# Patient Record
Sex: FEMALE | Race: WHITE | Employment: FULL TIME | URBAN - METROPOLITAN AREA
[De-identification: names, ages, dates, MRNs, and addresses within clinical notes are randomized per-mention and may not be internally consistent; named-entity substitution may affect disease eponyms.]

---

## 2017-01-18 ENCOUNTER — TELEPHONE (OUTPATIENT)
Dept: INTERNAL MEDICINE | Age: 57
End: 2017-01-18

## 2017-02-14 RX ORDER — LORAZEPAM 1 MG/1
TABLET ORAL
Qty: 30 TABLET | Refills: 5 | Status: SHIPPED | OUTPATIENT
Start: 2017-02-14 | End: 2017-06-16 | Stop reason: SDUPTHER

## 2017-06-16 ENCOUNTER — OFFICE VISIT (OUTPATIENT)
Dept: INTERNAL MEDICINE | Age: 57
End: 2017-06-16

## 2017-06-16 VITALS
DIASTOLIC BLOOD PRESSURE: 80 MMHG | SYSTOLIC BLOOD PRESSURE: 122 MMHG | WEIGHT: 210 LBS | BODY MASS INDEX: 35.85 KG/M2 | HEIGHT: 64 IN

## 2017-06-16 DIAGNOSIS — E66.09 NON MORBID OBESITY DUE TO EXCESS CALORIES: ICD-10-CM

## 2017-06-16 DIAGNOSIS — F41.9 ANXIETY: ICD-10-CM

## 2017-06-16 DIAGNOSIS — R73.9 HYPERGLYCEMIA: ICD-10-CM

## 2017-06-16 DIAGNOSIS — E78.00 PURE HYPERCHOLESTEROLEMIA: ICD-10-CM

## 2017-06-16 DIAGNOSIS — G43.009 MIGRAINE WITHOUT AURA AND WITHOUT STATUS MIGRAINOSUS, NOT INTRACTABLE: ICD-10-CM

## 2017-06-16 PROCEDURE — 99214 OFFICE O/P EST MOD 30 MIN: CPT | Performed by: INTERNAL MEDICINE

## 2017-06-16 RX ORDER — METFORMIN HYDROCHLORIDE 500 MG/1
1000 TABLET, EXTENDED RELEASE ORAL
Qty: 60 TABLET | Refills: 5 | Status: SHIPPED | OUTPATIENT
Start: 2017-06-16 | End: 2017-08-23 | Stop reason: SDUPTHER

## 2017-06-16 RX ORDER — LORAZEPAM 1 MG/1
TABLET ORAL
Qty: 30 TABLET | Refills: 0 | Status: SHIPPED | OUTPATIENT
Start: 2017-06-16 | End: 2017-06-16 | Stop reason: SDUPTHER

## 2017-06-18 RX ORDER — LORAZEPAM 1 MG/1
TABLET ORAL
Qty: 30 TABLET | Refills: 0 | OUTPATIENT
Start: 2017-06-18 | End: 2017-08-23 | Stop reason: SDUPTHER

## 2017-06-18 ASSESSMENT — ENCOUNTER SYMPTOMS
WHEEZING: 0
COLOR CHANGE: 0
BACK PAIN: 0
ABDOMINAL PAIN: 0
CHEST TIGHTNESS: 0
SHORTNESS OF BREATH: 0

## 2017-08-23 ENCOUNTER — TELEPHONE (OUTPATIENT)
Dept: INTERNAL MEDICINE | Age: 57
End: 2017-08-23

## 2017-08-23 RX ORDER — METFORMIN HYDROCHLORIDE 500 MG/1
1000 TABLET, EXTENDED RELEASE ORAL
Qty: 120 TABLET | Refills: 5 | Status: SHIPPED | OUTPATIENT
Start: 2017-08-23 | End: 2019-04-08

## 2017-08-23 RX ORDER — LORAZEPAM 1 MG/1
TABLET ORAL
Qty: 30 TABLET | Refills: 3 | Status: SHIPPED | OUTPATIENT
Start: 2017-08-23 | End: 2018-04-24 | Stop reason: SDUPTHER

## 2017-08-30 ENCOUNTER — TELEPHONE (OUTPATIENT)
Dept: INTERNAL MEDICINE | Age: 57
End: 2017-08-30

## 2017-09-27 ENCOUNTER — OFFICE VISIT (OUTPATIENT)
Dept: INTERNAL MEDICINE | Age: 57
End: 2017-09-27

## 2017-09-27 VITALS
HEIGHT: 64 IN | SYSTOLIC BLOOD PRESSURE: 118 MMHG | BODY MASS INDEX: 35.85 KG/M2 | DIASTOLIC BLOOD PRESSURE: 80 MMHG | WEIGHT: 210 LBS

## 2017-09-27 DIAGNOSIS — E66.09 NON MORBID OBESITY DUE TO EXCESS CALORIES: ICD-10-CM

## 2017-09-27 PROCEDURE — 99213 OFFICE O/P EST LOW 20 MIN: CPT | Performed by: INTERNAL MEDICINE

## 2017-09-27 RX ORDER — AMOXICILLIN AND CLAVULANATE POTASSIUM 875; 125 MG/1; MG/1
1 TABLET, FILM COATED ORAL 2 TIMES DAILY
Qty: 14 TABLET | Refills: 0 | Status: SHIPPED | OUTPATIENT
Start: 2017-09-27 | End: 2017-10-04

## 2017-09-30 ASSESSMENT — ENCOUNTER SYMPTOMS
WHEEZING: 0
COUGH: 0
COLOR CHANGE: 1
ABDOMINAL PAIN: 0

## 2017-10-10 ENCOUNTER — TELEPHONE (OUTPATIENT)
Dept: INTERNAL MEDICINE | Age: 57
End: 2017-10-10

## 2017-10-10 RX ORDER — PROMETHAZINE HYDROCHLORIDE AND CODEINE PHOSPHATE 6.25; 1 MG/5ML; MG/5ML
5 SYRUP ORAL EVERY 4 HOURS PRN
Qty: 180 ML | Refills: 1 | OUTPATIENT
Start: 2017-10-10 | End: 2021-02-25

## 2017-10-10 RX ORDER — AZITHROMYCIN 250 MG/1
TABLET, FILM COATED ORAL
Qty: 1 PACKET | Refills: 1 | Status: SHIPPED | OUTPATIENT
Start: 2017-10-10 | End: 2017-10-20

## 2017-10-10 NOTE — TELEPHONE ENCOUNTER
Can you call in phen w codeine cough syrup to her [facility]?  I sent in a [facility] for her

## 2018-04-24 RX ORDER — LORAZEPAM 1 MG/1
TABLET ORAL
Qty: 30 TABLET | Refills: 0 | OUTPATIENT
Start: 2018-04-24 | End: 2018-08-30 | Stop reason: SDUPTHER

## 2018-08-30 ENCOUNTER — TELEPHONE (OUTPATIENT)
Dept: INTERNAL MEDICINE | Age: 58
End: 2018-08-30

## 2018-08-30 DIAGNOSIS — R73.9 HYPERGLYCEMIA: ICD-10-CM

## 2018-08-30 DIAGNOSIS — E55.9 VITAMIN D DEFICIENCY: ICD-10-CM

## 2018-08-30 DIAGNOSIS — F41.9 ANXIETY: Primary | ICD-10-CM

## 2018-08-30 DIAGNOSIS — Z00.00 WELL ADULT EXAM: Primary | ICD-10-CM

## 2018-08-30 DIAGNOSIS — G43.009 MIGRAINE WITHOUT AURA AND WITHOUT STATUS MIGRAINOSUS, NOT INTRACTABLE: ICD-10-CM

## 2018-08-30 DIAGNOSIS — E78.00 PURE HYPERCHOLESTEROLEMIA: ICD-10-CM

## 2018-08-30 DIAGNOSIS — F41.9 ANXIETY: ICD-10-CM

## 2018-08-30 RX ORDER — LORAZEPAM 1 MG/1
TABLET ORAL
Qty: 30 TABLET | Refills: 0 | Status: SHIPPED | OUTPATIENT
Start: 2018-08-30 | End: 2018-08-30 | Stop reason: SDUPTHER

## 2018-08-30 RX ORDER — LORAZEPAM 1 MG/1
TABLET ORAL
Qty: 30 TABLET | Refills: 0 | Status: SHIPPED | OUTPATIENT
Start: 2018-08-30 | End: 2018-09-28 | Stop reason: SDUPTHER

## 2018-09-28 ENCOUNTER — OFFICE VISIT (OUTPATIENT)
Dept: INTERNAL MEDICINE CLINIC | Age: 58
End: 2018-09-28
Payer: COMMERCIAL

## 2018-09-28 VITALS
WEIGHT: 212 LBS | HEIGHT: 64 IN | BODY MASS INDEX: 36.19 KG/M2 | DIASTOLIC BLOOD PRESSURE: 80 MMHG | SYSTOLIC BLOOD PRESSURE: 140 MMHG

## 2018-09-28 DIAGNOSIS — F41.9 ANXIETY: ICD-10-CM

## 2018-09-28 DIAGNOSIS — R73.9 HYPERGLYCEMIA: ICD-10-CM

## 2018-09-28 DIAGNOSIS — E78.00 PURE HYPERCHOLESTEROLEMIA: ICD-10-CM

## 2018-09-28 DIAGNOSIS — L65.9 HAIR THINNING: ICD-10-CM

## 2018-09-28 DIAGNOSIS — Z23 NEED FOR INFLUENZA VACCINATION: ICD-10-CM

## 2018-09-28 DIAGNOSIS — K64.0 GRADE I HEMORRHOIDS: ICD-10-CM

## 2018-09-28 DIAGNOSIS — Z00.00 WELL ADULT EXAM: Primary | ICD-10-CM

## 2018-09-28 DIAGNOSIS — R80.0 ISOLATED PROTEINURIA WITHOUT SPECIFIC MORPHOLOGIC LESION: ICD-10-CM

## 2018-09-28 DIAGNOSIS — E66.09 CLASS 2 OBESITY DUE TO EXCESS CALORIES WITHOUT SERIOUS COMORBIDITY WITH BODY MASS INDEX (BMI) OF 36.0 TO 36.9 IN ADULT: ICD-10-CM

## 2018-09-28 PROCEDURE — 99396 PREV VISIT EST AGE 40-64: CPT | Performed by: INTERNAL MEDICINE

## 2018-09-28 PROCEDURE — 90471 IMMUNIZATION ADMIN: CPT | Performed by: INTERNAL MEDICINE

## 2018-09-28 PROCEDURE — 90662 IIV NO PRSV INCREASED AG IM: CPT | Performed by: INTERNAL MEDICINE

## 2018-09-28 RX ORDER — LORAZEPAM 1 MG/1
TABLET ORAL
Qty: 30 TABLET | Refills: 0 | Status: SHIPPED | OUTPATIENT
Start: 2018-09-28 | End: 2018-12-27 | Stop reason: SDUPTHER

## 2018-09-28 ASSESSMENT — ENCOUNTER SYMPTOMS
WHEEZING: 0
CHEST TIGHTNESS: 0
COLOR CHANGE: 0
ABDOMINAL PAIN: 0
BACK PAIN: 0

## 2018-09-28 ASSESSMENT — PATIENT HEALTH QUESTIONNAIRE - PHQ9
SUM OF ALL RESPONSES TO PHQ QUESTIONS 1-9: 1
1. LITTLE INTEREST OR PLEASURE IN DOING THINGS: 1
SUM OF ALL RESPONSES TO PHQ9 QUESTIONS 1 & 2: 1
2. FEELING DOWN, DEPRESSED OR HOPELESS: 0
SUM OF ALL RESPONSES TO PHQ QUESTIONS 1-9: 1

## 2018-09-28 NOTE — PROGRESS NOTES
140/80   09/27/17 118/80   06/16/17 122/80         Immunization History   Administered Date(s) Administered    DTaP 05/07/2003    Influenza Virus Vaccine 10/29/2010    Influenza, Intradermal, Preservative free 11/30/2012, 11/11/2013, 12/15/2015    Influenza, Intradermal, Quadrivalent, Preservative Free 09/12/2016    Tdap (Boostrix, Adacel) 11/11/2013       Past Medical History:   Diagnosis Date    Allergic rhinitis, cause unspecified     chronic    Anxiety state, unspecified     Blepharophimosis     Contact dermatitis and other eczema, due to unspecified cause     Depressive disorder, not elsewhere classified     Headache     Insomnia, unspecified     Obesity, unspecified     Other and unspecified hyperlipidemia     Other specified congenital anomaly of skin     Pain in both feet 12/17/2015    S/p fracture 2015     Screening colonoscopy 6/2014    normal-repeat 10 yrs     Unspecified sinusitis (chronic)      Past Surgical History:   Procedure Laterality Date    COLONOSCOPY  7/21/2014    repeat 10 yrs.  HYSTERECTOMY  2000    for prolapse partial    LIPOMA RESECTION  2007    left thigh     Family History   Problem Relation Age of Onset    Lung Cancer Father      Social History     Social History    Marital status:      Spouse name: N/A    Number of children: N/A    Years of education: N/A     Occupational History    Not on file. Social History Main Topics    Smoking status: Never Smoker    Smokeless tobacco: Never Used    Alcohol use 0.0 oz/week      Comment: occasionally    Drug use: No    Sexual activity: Not Currently     Other Topics Concern    Not on file     Social History Narrative    No narrative on file             Review of Systems   Constitutional: Negative for activity change, appetite change and fatigue. HENT: Negative for congestion. Eyes: Negative for visual disturbance. Respiratory: Negative for chest tightness and wheezing.     Cardiovascular: affect. Her speech is normal and behavior is normal. Judgment and thought content normal. Cognition and memory are normal.         Assessment and Plan:      Grade I hemorrhoids  Referred to rectal surgery     Hyperglycemia  Prefers to avoid metformin     Anxiety  Work on meditative boo    Hyperlipemia  ?? Is this reading correct-recheck 6 mo    Class 2 obesity due to excess calories without serious comorbidity with body mass index (BMI) of 36.0 to 36.9 in adult  Discussed with patient at length health risks of obesity and need for diet and exercise           Complete physical completed. Patient now up to date with all routine screening including Pap and colonoscopy if needed, yearly eye and dental exams,labs, dexa if indicated. Counseled re: nutrition/calcium and vit d supplementation,seat belt use, no cell phone use with driving.

## 2018-12-27 ENCOUNTER — OFFICE VISIT (OUTPATIENT)
Dept: INTERNAL MEDICINE CLINIC | Age: 58
End: 2018-12-27
Payer: COMMERCIAL

## 2018-12-27 VITALS
BODY MASS INDEX: 36.19 KG/M2 | HEIGHT: 64 IN | WEIGHT: 212 LBS | SYSTOLIC BLOOD PRESSURE: 138 MMHG | DIASTOLIC BLOOD PRESSURE: 80 MMHG

## 2018-12-27 DIAGNOSIS — E66.09 CLASS 2 OBESITY DUE TO EXCESS CALORIES WITHOUT SERIOUS COMORBIDITY WITH BODY MASS INDEX (BMI) OF 36.0 TO 36.9 IN ADULT: ICD-10-CM

## 2018-12-27 DIAGNOSIS — K58.0 IRRITABLE BOWEL SYNDROME WITH DIARRHEA: ICD-10-CM

## 2018-12-27 DIAGNOSIS — R73.9 HYPERGLYCEMIA: ICD-10-CM

## 2018-12-27 DIAGNOSIS — F41.9 ANXIETY: ICD-10-CM

## 2018-12-27 PROBLEM — K64.0 GRADE I HEMORRHOIDS: Status: RESOLVED | Noted: 2018-09-28 | Resolved: 2018-12-27

## 2018-12-27 PROCEDURE — 3017F COLORECTAL CA SCREEN DOC REV: CPT | Performed by: INTERNAL MEDICINE

## 2018-12-27 PROCEDURE — 1036F TOBACCO NON-USER: CPT | Performed by: INTERNAL MEDICINE

## 2018-12-27 PROCEDURE — G8482 FLU IMMUNIZE ORDER/ADMIN: HCPCS | Performed by: INTERNAL MEDICINE

## 2018-12-27 PROCEDURE — G8427 DOCREV CUR MEDS BY ELIG CLIN: HCPCS | Performed by: INTERNAL MEDICINE

## 2018-12-27 PROCEDURE — 99214 OFFICE O/P EST MOD 30 MIN: CPT | Performed by: INTERNAL MEDICINE

## 2018-12-27 PROCEDURE — G8417 CALC BMI ABV UP PARAM F/U: HCPCS | Performed by: INTERNAL MEDICINE

## 2018-12-27 RX ORDER — LORAZEPAM 1 MG/1
TABLET ORAL
Qty: 30 TABLET | Refills: 0 | Status: SHIPPED | OUTPATIENT
Start: 2018-12-27 | End: 2019-04-09 | Stop reason: SDUPTHER

## 2018-12-27 ASSESSMENT — ENCOUNTER SYMPTOMS
BACK PAIN: 0
WHEEZING: 0
DIARRHEA: 1
ABDOMINAL PAIN: 0
CHEST TIGHTNESS: 0
ABDOMINAL DISTENTION: 1
COLOR CHANGE: 0

## 2019-03-25 RX ORDER — PROMETHAZINE HYDROCHLORIDE 25 MG/1
25 SUPPOSITORY RECTAL EVERY 6 HOURS PRN
Qty: 10 SUPPOSITORY | Refills: 0 | Status: SHIPPED | OUTPATIENT
Start: 2019-03-25 | End: 2019-04-01

## 2019-04-03 ENCOUNTER — OFFICE VISIT (OUTPATIENT)
Dept: INTERNAL MEDICINE CLINIC | Age: 59
End: 2019-04-03
Payer: COMMERCIAL

## 2019-04-03 ENCOUNTER — TELEPHONE (OUTPATIENT)
Dept: INTERNAL MEDICINE CLINIC | Age: 59
End: 2019-04-03

## 2019-04-03 VITALS
SYSTOLIC BLOOD PRESSURE: 120 MMHG | OXYGEN SATURATION: 93 % | HEART RATE: 116 BPM | DIASTOLIC BLOOD PRESSURE: 82 MMHG | TEMPERATURE: 98.7 F

## 2019-04-03 DIAGNOSIS — J06.9 VIRAL UPPER RESPIRATORY TRACT INFECTION: ICD-10-CM

## 2019-04-03 DIAGNOSIS — H90.12 CONDUCTIVE HEARING LOSS OF LEFT EAR WITH UNRESTRICTED HEARING OF RIGHT EAR: Primary | ICD-10-CM

## 2019-04-03 PROCEDURE — G8427 DOCREV CUR MEDS BY ELIG CLIN: HCPCS | Performed by: NURSE PRACTITIONER

## 2019-04-03 PROCEDURE — 3017F COLORECTAL CA SCREEN DOC REV: CPT | Performed by: NURSE PRACTITIONER

## 2019-04-03 PROCEDURE — 1036F TOBACCO NON-USER: CPT | Performed by: NURSE PRACTITIONER

## 2019-04-03 PROCEDURE — G8417 CALC BMI ABV UP PARAM F/U: HCPCS | Performed by: NURSE PRACTITIONER

## 2019-04-03 PROCEDURE — 99213 OFFICE O/P EST LOW 20 MIN: CPT | Performed by: NURSE PRACTITIONER

## 2019-04-03 ASSESSMENT — ENCOUNTER SYMPTOMS
SINUS PAIN: 0
NAUSEA: 0
VOMITING: 0
SORE THROAT: 0
SHORTNESS OF BREATH: 0
RHINORRHEA: 1
COUGH: 0
SINUS PRESSURE: 1
WHEEZING: 0
DIARRHEA: 0
BACK PAIN: 0

## 2019-04-03 NOTE — PROGRESS NOTES
Subjective:      Patient ID: Ondina King is a 64 y.o. female. Chief Complaint   Patient presents with    Generalized Body Aches    Fever    Emesis     She was taking Phenergan which helped     HPI  Rock Belcher is here for low grade fever, body aches, and sinus pressure that began yesterday. Fever was 100.0. Headache. Sudafed helped. Changes pain. No ear pain. Ear fullness on the left but not painful. She also reprots runny nose and sneezing. Denies coughing and sore throat. She reports a stomach virus last week with nausea, vomiting, and diarrhea. She was given phenergan with codeine which helped. Symptoms have resolved. Review of Systems   Constitutional: Positive for fatigue and fever. Negative for chills. HENT: Positive for rhinorrhea and sinus pressure. Negative for congestion, sinus pain and sore throat. Respiratory: Negative for cough, shortness of breath and wheezing. Cardiovascular: Negative for chest pain and palpitations. Gastrointestinal: Negative for diarrhea, nausea and vomiting. Musculoskeletal: Positive for myalgias. Negative for arthralgias, back pain and gait problem. Neurological: Positive for headaches. Negative for dizziness, syncope and weakness. Hematological: Negative for adenopathy. Objective:   Physical Exam   Constitutional: She is oriented to person, place, and time. She appears well-developed and well-nourished. HENT:   Right Ear: Tympanic membrane normal.   Left Ear: Tympanic membrane normal.   Nose: Rhinorrhea present. No mucosal edema. Right sinus exhibits no maxillary sinus tenderness and no frontal sinus tenderness. Left sinus exhibits no maxillary sinus tenderness and no frontal sinus tenderness. Mouth/Throat: Oropharynx is clear and moist. No oropharyngeal exudate. Cardiovascular: Normal rate, regular rhythm and normal heart sounds.    Pulmonary/Chest: Effort normal and breath sounds normal.   Neurological: She is alert and oriented to person, place, and time. Skin: Skin is warm and dry. Assessment:      See Problem List assessment and plan       Plan:       Viral upper respiratory tract infection  Continue symptomatic treatment   Suggested decongestants, antihistamines, saline rinses  Tylenol and ibuprofen for pain and fevers   Call for worsening symptoms or if symptoms do not improve in 4-7 days. Patient engaged in shared decision making. Information given to evaluate options of treatment, understand what is needed and discuss importance of following plan.

## 2019-04-03 NOTE — TELEPHONE ENCOUNTER
Received a summary of hearing loss via fax today from our office  [facility] is not sure why  Is the pt going to be calling to schedule an appointment? Or does [facility] need to call the patient?   Please return call

## 2019-04-03 NOTE — TELEPHONE ENCOUNTER
Lm with [facility]  Patient will call to schedule appointment

## 2019-04-08 ENCOUNTER — OFFICE VISIT (OUTPATIENT)
Dept: INTERNAL MEDICINE CLINIC | Age: 59
End: 2019-04-08
Payer: COMMERCIAL

## 2019-04-08 ENCOUNTER — TELEPHONE (OUTPATIENT)
Dept: INTERNAL MEDICINE CLINIC | Age: 59
End: 2019-04-08

## 2019-04-08 VITALS
WEIGHT: 212 LBS | SYSTOLIC BLOOD PRESSURE: 122 MMHG | HEIGHT: 64 IN | BODY MASS INDEX: 36.19 KG/M2 | DIASTOLIC BLOOD PRESSURE: 82 MMHG

## 2019-04-08 DIAGNOSIS — B34.9 VIRAL SYNDROME: ICD-10-CM

## 2019-04-08 DIAGNOSIS — F41.9 ANXIETY: ICD-10-CM

## 2019-04-08 DIAGNOSIS — M25.542 ARTHRALGIA OF BOTH HANDS: ICD-10-CM

## 2019-04-08 DIAGNOSIS — R73.9 HYPERGLYCEMIA: ICD-10-CM

## 2019-04-08 DIAGNOSIS — M25.541 ARTHRALGIA OF BOTH HANDS: ICD-10-CM

## 2019-04-08 DIAGNOSIS — E66.09 CLASS 2 OBESITY DUE TO EXCESS CALORIES WITHOUT SERIOUS COMORBIDITY WITH BODY MASS INDEX (BMI) OF 36.0 TO 36.9 IN ADULT: ICD-10-CM

## 2019-04-08 PROBLEM — J06.9 VIRAL UPPER RESPIRATORY TRACT INFECTION: Status: RESOLVED | Noted: 2019-04-03 | Resolved: 2019-04-08

## 2019-04-08 LAB
A/G RATIO: 1 (ref 1.1–2.2)
ALBUMIN SERPL-MCNC: 3.6 G/DL (ref 3.4–5)
ALP BLD-CCNC: 93 U/L (ref 40–129)
ALT SERPL-CCNC: 33 U/L (ref 10–40)
ANION GAP SERPL CALCULATED.3IONS-SCNC: 13 MMOL/L (ref 3–16)
AST SERPL-CCNC: 20 U/L (ref 15–37)
BASOPHILS ABSOLUTE: 0 K/UL (ref 0–0.2)
BASOPHILS RELATIVE PERCENT: 0.3 %
BILIRUB SERPL-MCNC: 0.4 MG/DL (ref 0–1)
BUN BLDV-MCNC: 11 MG/DL (ref 7–20)
CALCIUM SERPL-MCNC: 9 MG/DL (ref 8.3–10.6)
CHLORIDE BLD-SCNC: 101 MMOL/L (ref 99–110)
CO2: 26 MMOL/L (ref 21–32)
CREAT SERPL-MCNC: 0.5 MG/DL (ref 0.6–1.1)
CREATININE URINE: 269.6 MG/DL (ref 28–259)
EOSINOPHILS ABSOLUTE: 0.2 K/UL (ref 0–0.6)
EOSINOPHILS RELATIVE PERCENT: 3.1 %
GFR AFRICAN AMERICAN: >60
GFR NON-AFRICAN AMERICAN: >60
GLOBULIN: 3.7 G/DL
GLUCOSE BLD-MCNC: 116 MG/DL (ref 70–99)
HCT VFR BLD CALC: 37 % (ref 36–48)
HEMOGLOBIN: 12.7 G/DL (ref 12–16)
LYMPHOCYTES ABSOLUTE: 1.1 K/UL (ref 1–5.1)
LYMPHOCYTES RELATIVE PERCENT: 15.2 %
MCH RBC QN AUTO: 30.9 PG (ref 26–34)
MCHC RBC AUTO-ENTMCNC: 34.3 G/DL (ref 31–36)
MCV RBC AUTO: 90.2 FL (ref 80–100)
MICROALBUMIN UR-MCNC: <1.2 MG/DL
MICROALBUMIN/CREAT UR-RTO: ABNORMAL MG/G (ref 0–30)
MONOCYTES ABSOLUTE: 0.6 K/UL (ref 0–1.3)
MONOCYTES RELATIVE PERCENT: 8.5 %
NEUTROPHILS ABSOLUTE: 5.4 K/UL (ref 1.7–7.7)
NEUTROPHILS RELATIVE PERCENT: 72.9 %
PDW BLD-RTO: 13.4 % (ref 12.4–15.4)
PLATELET # BLD: 215 K/UL (ref 135–450)
PMV BLD AUTO: 10.5 FL (ref 5–10.5)
POTASSIUM SERPL-SCNC: 4.3 MMOL/L (ref 3.5–5.1)
RBC # BLD: 4.1 M/UL (ref 4–5.2)
RHEUMATOID FACTOR: 26 IU/ML
SODIUM BLD-SCNC: 140 MMOL/L (ref 136–145)
TOTAL PROTEIN: 7.3 G/DL (ref 6.4–8.2)
TSH SERPL DL<=0.05 MIU/L-ACNC: 1.89 UIU/ML (ref 0.27–4.2)
WBC # BLD: 7.4 K/UL (ref 4–11)

## 2019-04-08 PROCEDURE — G8417 CALC BMI ABV UP PARAM F/U: HCPCS | Performed by: INTERNAL MEDICINE

## 2019-04-08 PROCEDURE — G8428 CUR MEDS NOT DOCUMENT: HCPCS | Performed by: INTERNAL MEDICINE

## 2019-04-08 PROCEDURE — 3017F COLORECTAL CA SCREEN DOC REV: CPT | Performed by: INTERNAL MEDICINE

## 2019-04-08 PROCEDURE — 1036F TOBACCO NON-USER: CPT | Performed by: INTERNAL MEDICINE

## 2019-04-08 PROCEDURE — 99214 OFFICE O/P EST MOD 30 MIN: CPT | Performed by: INTERNAL MEDICINE

## 2019-04-08 ASSESSMENT — PATIENT HEALTH QUESTIONNAIRE - PHQ9
SUM OF ALL RESPONSES TO PHQ9 QUESTIONS 1 & 2: 2
SUM OF ALL RESPONSES TO PHQ QUESTIONS 1-9: 2
SUM OF ALL RESPONSES TO PHQ QUESTIONS 1-9: 2
2. FEELING DOWN, DEPRESSED OR HOPELESS: 0
1. LITTLE INTEREST OR PLEASURE IN DOING THINGS: 2

## 2019-04-08 ASSESSMENT — ENCOUNTER SYMPTOMS
CHEST TIGHTNESS: 0
SHORTNESS OF BREATH: 0
SINUS PRESSURE: 0
COLOR CHANGE: 0
WHEEZING: 0
RHINORRHEA: 0
COUGH: 0
ABDOMINAL PAIN: 0
BACK PAIN: 0

## 2019-04-08 NOTE — PROGRESS NOTES
Subjective:      Patient ID: Jeremy Brantley is a 64 y.o. female. Chief Complaint   Patient presents with    Pain     x 2 wks not feeling bad , now hard to move around ,all joints really hurting painful walking,neck pain, using adivl  not much relief      Got sl better after ? Viral infection then recurred w severe joint pain in hands/legs and neck. Tearful as she is so miserable but only took 1 advil. Had low grade fever. No cough. Severe fatigue. conts w some anxiety but not on zoloft any longer and depression has been fairly well controlled. No progress w wt loss. Also off of her metformin. Review of Systems   Constitutional: Positive for fatigue. Negative for activity change and appetite change. HENT: Negative for congestion, rhinorrhea and sinus pressure. Eyes: Negative for visual disturbance. Respiratory: Negative for cough, chest tightness, shortness of breath and wheezing. Cardiovascular: Negative for chest pain and palpitations. Gastrointestinal: Negative for abdominal pain. Musculoskeletal: Positive for arthralgias and myalgias. Negative for back pain. Skin: Negative for color change and rash. Neurological: Negative for weakness, light-headedness and headaches. Psychiatric/Behavioral: The patient is nervous/anxious. Objective:   Physical Exam   Constitutional: She is oriented to person, place, and time. She appears well-developed and well-nourished. HENT:   Head: Normocephalic and atraumatic. Eyes: Pupils are equal, round, and reactive to light. Conjunctivae and EOM are normal.   Neck: Normal range of motion. Neck supple. Cardiovascular: Normal rate, regular rhythm and normal heart sounds. Pulmonary/Chest: Effort normal and breath sounds normal. No respiratory distress. Abdominal: She exhibits no distension. There is no tenderness. Musculoskeletal: She exhibits tenderness. She exhibits no edema.    Tenderness in hands w/out redness or swelling   No other swollen red or hot joints    Lymphadenopathy:     She has no cervical adenopathy. Neurological: She is alert and oriented to person, place, and time. Skin: Skin is warm and dry. Psychiatric: She has a normal mood and affect. Her behavior is normal. Judgment and thought content normal.         Assessment and Plan:      Arthralgia of both hands  Likely viral     Anxiety  Worse w illness- try to avoid overuse of ativan     Hyperglycemia  Recheck     Viral syndrome  Likely etiology but will r/o RA    Class 2 obesity due to excess calories without serious comorbidity with body mass index (BMI) of 36.0 to 36.9 in adult  Discussed with patient at length health risks of obesity and need for diet and exercise         Encounter Diagnoses   Name Primary?     Arthralgia of both hands     Anxiety     Hyperglycemia     Viral syndrome     Class 2 obesity due to excess calories without serious comorbidity with body mass index (BMI) of 36.0 to 36.9 in adult        Orders Placed This Encounter   Procedures    TSH without Reflex     Standing Status:   Future     Number of Occurrences:   1     Standing Expiration Date:   4/7/2020    CBC Auto Differential     Standing Status:   Future     Number of Occurrences:   1     Standing Expiration Date:   4/7/2020    Comprehensive Metabolic Panel     Standing Status:   Future     Number of Occurrences:   1     Standing Expiration Date:   4/7/2020    Sedimentation rate, automated     Standing Status:   Future     Number of Occurrences:   1     Standing Expiration Date:   4/7/2020    Rheumatoid Factor     Standing Status:   Future     Number of Occurrences:   1     Standing Expiration Date:   7/1/3569    Cyclic Citrul Peptide Antibody, IgG     Standing Status:   Future     Number of Occurrences:   1     Standing Expiration Date:   4/8/2020    Hemoglobin A1C     Standing Status:   Future     Number of Occurrences:   1     Standing Expiration Date:   4/7/2020    Microalbumin / Creatinine Urine Ratio     Standing Status:   Future     Number of Occurrences:   1     Standing Expiration Date:   4/7/2020    URINALYSIS WITH MICROSCOPIC     Order Specific Question:   SPECIFY(EX-CATH,MIDSTREAM,CYSTO,ETC)?      Answer:   midstream

## 2019-04-09 ENCOUNTER — TELEPHONE (OUTPATIENT)
Dept: INTERNAL MEDICINE CLINIC | Age: 59
End: 2019-04-09

## 2019-04-09 DIAGNOSIS — M05.79 RHEUMATOID ARTHRITIS INVOLVING MULTIPLE SITES WITH POSITIVE RHEUMATOID FACTOR (HCC): Primary | ICD-10-CM

## 2019-04-09 DIAGNOSIS — M05.79 RHEUMATOID ARTHRITIS INVOLVING MULTIPLE SITES WITH POSITIVE RHEUMATOID FACTOR (HCC): ICD-10-CM

## 2019-04-09 DIAGNOSIS — F41.9 ANXIETY: ICD-10-CM

## 2019-04-09 LAB
ESTIMATED AVERAGE GLUCOSE: 131.2 MG/DL
HBA1C MFR BLD: 6.2 %
SEDIMENTATION RATE, ERYTHROCYTE: 92 MM/HR (ref 0–30)

## 2019-04-09 RX ORDER — LORAZEPAM 1 MG/1
TABLET ORAL
Qty: 30 TABLET | Refills: 0 | Status: SHIPPED | OUTPATIENT
Start: 2019-04-09 | End: 2019-07-23 | Stop reason: SDUPTHER

## 2019-04-09 RX ORDER — PREDNISONE 20 MG/1
20 TABLET ORAL DAILY
Qty: 20 TABLET | Refills: 0 | Status: SHIPPED | OUTPATIENT
Start: 2019-04-09 | End: 2019-04-29

## 2019-04-09 NOTE — TELEPHONE ENCOUNTER
Just diagnosed w acute Rheumatoid arthritis- started her on prednisone 20 mg daily until seen by Andrew Caldwell will you call Dr. Sarah Gutierrez office and get her in asap?

## 2019-04-10 ENCOUNTER — OFFICE VISIT (OUTPATIENT)
Dept: RHEUMATOLOGY | Age: 59
End: 2019-04-10
Payer: COMMERCIAL

## 2019-04-10 VITALS
SYSTOLIC BLOOD PRESSURE: 122 MMHG | HEIGHT: 64 IN | DIASTOLIC BLOOD PRESSURE: 72 MMHG | BODY MASS INDEX: 38.07 KG/M2 | WEIGHT: 223 LBS

## 2019-04-10 DIAGNOSIS — M06.4 INFLAMMATORY POLYARTHRITIS (HCC): Primary | ICD-10-CM

## 2019-04-10 PROCEDURE — 99245 OFF/OP CONSLTJ NEW/EST HI 55: CPT | Performed by: INTERNAL MEDICINE

## 2019-04-10 PROCEDURE — G8427 DOCREV CUR MEDS BY ELIG CLIN: HCPCS | Performed by: INTERNAL MEDICINE

## 2019-04-10 PROCEDURE — 3017F COLORECTAL CA SCREEN DOC REV: CPT | Performed by: INTERNAL MEDICINE

## 2019-04-10 PROCEDURE — G8417 CALC BMI ABV UP PARAM F/U: HCPCS | Performed by: INTERNAL MEDICINE

## 2019-04-10 RX ORDER — PREDNISONE 10 MG/1
TABLET ORAL
Qty: 35 TABLET | Refills: 0 | Status: SHIPPED | OUTPATIENT
Start: 2019-04-10 | End: 2019-11-08 | Stop reason: CLARIF

## 2019-04-10 NOTE — PROGRESS NOTES
[address], [place]                                                           [address], [address] [facility]                                                              (j) [phone] (F)      Dear Dr. Jordi Jordan MD:  Please find Rheumatology assessment. Thank you for giving me the opportunity to be involved in [address] care and I look forward following Domonique Bains along with you. If you have any questions or concerns please feel free to reach me. Note is transcribed using voice recognition software. Inadvertent computerized transcription errors may be present. Patient identification: Domonique Mata,: 1960,59 y.o. Sex: female     A/P  Domonique Bains was seen today for joint pain. Diagnoses and all orders for this visit:    Inflammatory polyarthritis ([address]    Other orders  -     predniSONE (DELTASONE) 10 MG tablet; 2 tab po daily x 7 days. 1.5 tab po daily x 7 days. 1 tab po daily x 7 days. 1/2 tab po daily 7 days and stop. History, physical examination, laboratory evaluation is suggestive of acute inflammatory polyarthritis-had elevated sed rate and rheumatoid factor-26. She is doing better on 20 mg of prednisone which she started 2 days ago, still in significant discomfort mainly in her larger joints. Previous history of mild intercurrent arthralgias wrist, hips, knees. It is unclear at this time if it is part of intercurrent viral arthritis or a beginning of systemic inflammatory arthritis/ rheumatoid arthritis, as her symptoms has been present only for couple of weeks.     Lab Results   Component Value Date    SEDRATE 80 (H) 2019       Plan-  Wait on CCP antibody, if it is also positive, it is likely that it could be the onset of systemic rheumatoid discomfort in her shoulders, hips, wrists, states that nothing really significant, was able to deal with it. She did however prefer aquatic therapy over ground exercises, and had been swimming at [facility]. No history of psoriasis or inflammatory bowel diseases. No known viral infections other than she felt as if she had a virus. Pertinent ROS: Denies weight loss, objective fever, skin rashes or skin thickening, photosensitivity Raynauds, focal alopecia, recurrent ocular congestion, dry eyes or mouth, or mucosal ulcers, tinnitus or recent hearing loss. Denies chest pain, palpitations, cough, pleurisy, dysphagia, or features of inflammatory bowel diseases. No h/o blood clots or bleeding disorders. No renal or genitourinary problems. No focal weakness or persistent paresthesia. All other ROS are negative. Past Medical History:   Diagnosis Date    Allergic rhinitis, cause unspecified     chronic    Anxiety state, unspecified     Blepharophimosis     Contact dermatitis and other eczema, due to unspecified cause     Depressive disorder, not elsewhere classified     Headache(784.0)     Insomnia, unspecified     Obesity, unspecified     Other and unspecified hyperlipidemia     Other specified congenital anomaly of skin     Pain in both feet 12/17/2015    S/p fracture 2015     Screening colonoscopy 6/2014    normal-repeat 10 yrs     Unspecified sinusitis (chronic)      Past Surgical History:   Procedure Laterality Date    COLONOSCOPY  7/21/2014    repeat 10 yrs.     HYSTERECTOMY  2000    for prolapse partial    LIPOMA RESECTION  2007    left thigh     Social History     Socioeconomic History    Marital status:      Spouse name: Not on file    Number of children: Not on file    Years of education: Not on file    Highest education level: Not on file   Occupational History    Not on file   Social Needs    Financial resource strain: Not on file    Food insecurity:     Worry: Not on file     Inability: Not on file    Transportation needs:     Medical: Not on file     Non-medical: Not on file   Tobacco Use    Smoking status: Never Smoker    Smokeless tobacco: Never Used   Substance and Sexual Activity    Alcohol use: Yes     Alcohol/week: 0.0 oz     Comment: occasionally    Drug use: No    Sexual activity: Not Currently   Lifestyle    Physical activity:     Days per week: Not on file     Minutes per session: Not on file    Stress: Not on file   Relationships    Social connections:     Talks on phone: Not on file     Gets together: Not on file     Attends Sabianism service: Not on file     Active member of club or organization: Not on file     Attends meetings of clubs or organizations: Not on file     Relationship status: Not on file    Intimate partner violence:     Fear of current or ex partner: Not on file     Emotionally abused: Not on file     Physically abused: Not on file     Forced sexual activity: Not on file   Other Topics Concern    Not on file   Social History Narrative    Not on file       No family history of autoimmune diseases    Current Outpatient Medications   Medication Sig Dispense Refill    predniSONE (DELTASONE) 10 MG tablet 2 tab po daily x 7 days. 1.5 tab po daily x 7 days. 1 tab po daily x 7 days. 1/2 tab po daily 7 days and stop.  35 tablet 0    predniSONE (DELTASONE) 20 MG tablet Take 1 tablet by mouth daily for 20 days With food  With tapering per pt's rheumatologist 20 tablet 0    LORazepam (ATIVAN) 1 MG tablet TAKE ONE-HALF TO ONE TABLET BY MOUTH EVERY 6 HOURS 30 tablet 0    promethazine-codeine (PHENERGAN WITH CODEINE) 6.25-10 MG/5ML syrup Take 5 mLs by mouth every 4 hours as needed for Cough 180 mL 1    beclomethasone (QNASL) 80 MCG/ACT AERS nasal spray USE 2 SPRAYS IN EACH NOSTRIL EVERY DAY 8.7 g 12    butalbital-acetaminophen-caffeine (FIORICET, ESGIC) -40 MG per tablet TAKE 1 TABLET BY MOUTH EVERY 6 HOURS AS NEEDED FOR HEADACHE 30 tablet 3    ketoconazole (NIZORAL) 2 % cream APPLY TWICE DAILY 60 g 12    ammonium lactate (AMLACTIN) 12 % cream APPLY TOPICALLY AS NEEDED 280 g 0    oxymetazoline (AFRIN) 0.05 % nasal spray 2 sprays by Nasal route 2 times daily.  malathion (OVIDE) 0.5 % lotion Apply topically once. 1 Bottle 3    mometasone (ELOCON) 0.1 % cream Apply  topically daily. No current facility-administered medications for this visit. No Known Allergies    PHYSICAL EXAM:    Vitals:    /72   Ht 5' 4\" (1.626 m)   Wt 223 lb (101.2 kg)   BMI 38.28 kg/m²   General appearance/ Psychiatric: well nourished, and well groomed, normal judgement, alert, appears stated age and cooperative. MKS: 28 joint JOINT COUNT:                               Right                                                  Left   Swell Tender Swell Tender   PIP1           PIP2  x  x    x   PIP3  x  x  x  x   PIP4   x    x   PIP5          MCP1           MCP2  x  x    x   MCP3    x    x   MCP4    x    x   MCP5    x    x   Wrist  x  x    x   Elbow           Shoulder    x   x   Knee           Full fist bilaterally, associated with discomfort across PIPs and MCPs. Range of motion in her shoulder limited about 20-30% because of stiffness and discomfort. Severe limitation in hip abduction because of groin pain. Ankles are tender in joint line, soft tissue swelling present. MTPs are nontender. Normal gait, muscle strength in upper and lower extremities. Skin: No rashes, no induration or skin thickening or nodules. No evidence ischemia or deformities noted in digits or nails. HEENT: Normal lids, lacrimal glands and pupils. No oral or nasal ulcers. Salivary glands reveal no evidence of abnormality. External inspection of the ears and nose within normal limits. Neck: No masses or asymmetry. No thyroid enlargement. Chest: Normal effort, clear to auscultation. Heart:  Normal s1/s2, no leg edema. Abdomen: soft, non-tender. Liver no palpable.    Neurologic: normal deep tendon reflexes. No foot or wrist drop. DATA:   Lab Results   Component Value Date    WBC 7.4 04/08/2019    HGB 12.7 04/08/2019    HCT 37.0 04/08/2019    MCV 90.2 04/08/2019     04/08/2019         Chemistry        Component Value Date/Time     04/08/2019 1244    K 4.3 04/08/2019 1244     04/08/2019 1244    CO2 26 04/08/2019 1244    BUN 11 04/08/2019 1244    CREATININE 0.5 (L) 04/08/2019 1244        Component Value Date/Time    CALCIUM 9.0 04/08/2019 1244    ALKPHOS 93 04/08/2019 1244    AST 20 04/08/2019 1244    ALT 33 04/08/2019 1244    BILITOT 0.4 04/08/2019 1244           Lab Results   Component Value Date    SEDRATE 92 (H) 04/08/2019     No results found for: CKTOTAL  Lab Results   Component Value Date    TSH 1.89 04/08/2019     Lab Results   Component Value Date    VITD25 31.5 09/21/2018       A/P- See above.

## 2019-04-10 NOTE — PATIENT INSTRUCTIONS
Take 1 tab twice a day ( 40 mg) x 4 days  Take 30 mg  Daily in AM x 4 days  Then follow the taper I gave it to you.

## 2019-04-11 ENCOUNTER — TELEPHONE (OUTPATIENT)
Dept: RHEUMATOLOGY | Age: 59
End: 2019-04-11

## 2019-04-11 LAB — CCP IGG ANTIBODIES: 3 UNITS (ref 0–19)

## 2019-04-11 NOTE — TELEPHONE ENCOUNTER
CCP- is negative which is very specific for RA. Will see how she does with steroid taper. It is a good new but does not rule out Rheumatoid arthritis. We just have to wait and follow her closely. We had discussed the plan yesterday.

## 2019-04-11 NOTE — TELEPHONE ENCOUNTER
Patient was calling to let you know her labs are in the system . Since  was the one who ordered them they would not come to you.

## 2019-05-10 ENCOUNTER — OFFICE VISIT (OUTPATIENT)
Dept: RHEUMATOLOGY | Age: 59
End: 2019-05-10
Payer: COMMERCIAL

## 2019-05-10 VITALS
HEIGHT: 64 IN | DIASTOLIC BLOOD PRESSURE: 78 MMHG | BODY MASS INDEX: 38.07 KG/M2 | SYSTOLIC BLOOD PRESSURE: 124 MMHG | WEIGHT: 223 LBS

## 2019-05-10 DIAGNOSIS — M06.4 INFLAMMATORY POLYARTHRITIS (HCC): ICD-10-CM

## 2019-05-10 DIAGNOSIS — M15.9 PRIMARY OSTEOARTHRITIS INVOLVING MULTIPLE JOINTS: ICD-10-CM

## 2019-05-10 DIAGNOSIS — M06.4 INFLAMMATORY POLYARTHRITIS (HCC): Primary | ICD-10-CM

## 2019-05-10 LAB
C-REACTIVE PROTEIN: 11.1 MG/L (ref 0–5.1)
SEDIMENTATION RATE, ERYTHROCYTE: 32 MM/HR (ref 0–30)

## 2019-05-10 PROCEDURE — 99214 OFFICE O/P EST MOD 30 MIN: CPT | Performed by: INTERNAL MEDICINE

## 2019-05-10 PROCEDURE — G8427 DOCREV CUR MEDS BY ELIG CLIN: HCPCS | Performed by: INTERNAL MEDICINE

## 2019-05-10 PROCEDURE — 1036F TOBACCO NON-USER: CPT | Performed by: INTERNAL MEDICINE

## 2019-05-10 PROCEDURE — 3017F COLORECTAL CA SCREEN DOC REV: CPT | Performed by: INTERNAL MEDICINE

## 2019-05-10 PROCEDURE — G8417 CALC BMI ABV UP PARAM F/U: HCPCS | Performed by: INTERNAL MEDICINE

## 2019-05-10 NOTE — PATIENT INSTRUCTIONS
Patient Education        hydroxychloroquine  Pronunciation:  sonya brown ee KLOR oh kwin  Brand:  Plaquenil  What is the most important information I should know about hydroxychloroquine? Taking hydroxychloroquine long-term or at high doses may cause irreversible damage to the retina of your eye. Stop taking hydroxychloroquine and call your doctor at once if you have trouble focusing, if you see light streaks or flashes in your vision, or if you notice any swelling or color changes in your eyes. What is hydroxychloroquine? Hydroxychloroquine is used to treat or prevent malaria, a disease caused by parasites that enter the body through the bite of a mosquito. Malaria is common in areas such as [place], [place], and [place]. This medicine is not effective against all strains of malaria. Hydroxychloroquine is also used to treat symptoms of rheumatoid arthritis and discoid or systemic lupus erythematosus. Hydroxychloroquine may also be used for purposes not listed in this medication guide. What should I discuss with my health care provider before taking hydroxychloroquine? You should not use hydroxychloroquine if you are allergic to it. Hydroxychloroquine should not be used for long-term treatment in children. To make sure hydroxychloroquine is safe for you, tell your doctor if you have:  · a history of vision changes or damage to your retina caused by an anti-malaria medication;  · heart disease, heart rhythm disorder (such as long QT syndrome);  · diabetes;  · a stomach disorder;  · an allergy to quinine;  · liver or kidney disease;  · psoriasis;  · alcoholism; or  · a genetic enzyme disorder such as porphyria or glucose-6-phosphate dehydrogenase (G6PD) deficiency. It is not known whether this medicine will harm an unborn baby. Tell your doctor if you are pregnant or plan to become pregnant. Malaria is more likely to cause death in a pregnant woman.   If you are pregnant, talk with your doctor not improve after 6 months of treatment. While using hydroxychloroquine, you may need frequent blood tests and vision exams. Store at room temperature away from moisture, heat, and light. What happens if I miss a dose? Take the missed dose as soon as you remember. Skip the missed dose if it is almost time for your next scheduled dose. Do not take extra medicine to make up the missed dose. What happens if I overdose? Seek emergency medical attention or call the Poison Help line at 1-875.777.6830. An overdose of hydroxychloroquine can be fatal, especially in children. Hydroxychloroquine overdose must be treated quickly. You may be told to induce vomiting right away (at home, before transport to an emergency room). Ask the poison control center how to induce vomiting in the case of an overdose. Overdose symptoms may include drowsiness, vision changes, slow heart rate, chest pain, severe dizziness, seizure (convulsions), or shallow breathing. What should I avoid while taking hydroxychloroquine? Avoid taking an antacid or Kaopectate (kaolin-pectin) within 4 hours before or after you take hydroxychloroquine. Some antacids can make it harder for your body to absorb hydroxychloroquine. What are the possible side effects of hydroxychloroquine? Get emergency medical help if you have signs of an allergic reaction:  hives; difficulty breathing; swelling of your face, lips, tongue, or throat. Taking hydroxychloroquine long-term or at high doses may cause irreversible damage to the retina of your eye. Stop taking hydroxychloroquine and call your doctor at once if you have trouble focusing, if you see light streaks or flashes in your vision, or if you notice any swelling or color changes in your eyes.   Call your doctor at once if you have:  · headache with chest pain and severe dizziness, fainting, fast or pounding heartbeats;  · very slow heart rate, weak pulse;  · muscle weakness, numbness or tingling;  · low blood sugar --headache, hunger, sweating, irritability, dizziness, nausea, fast heart rate, and feeling anxious or shaky; or  · low blood cell counts --fever, chills, sore throat, weakness or ill feeling, swollen gums, mouth sores, skin sores, rapid heart rate, pale skin, easy bruising, unusual bleeding, feeling light-headed. Common side effects may include:  · headache, dizziness, ringing in your ears;  · nausea, vomiting, stomach pain;  · loss of appetite, weight loss;  · mood changes, feeling nervous or irritable;  · skin rash or itching; or  · hair loss. This is not a complete list of side effects and others may occur. Call your doctor for medical advice about side effects. You may report side effects to FDA at 0-699-FDA-5274. What other drugs will affect hydroxychloroquine? Hydroxychloroquine can cause serious liver or heart problems, especially if you use certain medicines at the same time, including:  · other medicines to treat malaria;  · an antibiotic or antifungal medicine;  · antiviral medicine to treat hepatitis or HIV/AIDS;  · antidepressants or antipsychotic medicines;  · birth control pills or hormone replacement therapy;  · cancer medication;  · cholesterol-lowering medication;  · heart or blood pressure medicine;  · pain or arthritis medicines (including aspirin, Tylenol, Advil, and Aleve);  · seizure medication;  · stomach acid reducers; or  · tuberculosis medicine. This list is not complete and many other drugs can interact with hydroxychloroquine. This includes prescription and over-the-counter medicines, vitamins, and herbal products. Not all possible interactions are listed in this medication guide. Tell your doctor about all medicines you use, and those you start or stop using during your treatment with hydroxychloroquine. Give a list of all your medicines to any healthcare provider who treats you. Where can I get more information?   Your pharmacist can provide more information about hydroxychloroquine. Remember, keep this and all other medicines out of the reach of children, never share your medicines with others, and use this medication only for the indication prescribed. Every effort has been made to ensure that the information provided by [address] is accurate, up-to-date, and complete, but no guarantee is made to that effect. Drug information contained herein may be time sensitive. [facility] information has been compiled for use by healthcare practitioners and consumers in the [place] and therefore [facility] does not warrant that uses outside of the [place] are appropriate, unless specifically indicated otherwise. [facility]'s drug information does not endorse drugs, diagnose patients or recommend therapy. [facility]'s drug information is an informational resource designed to assist licensed healthcare practitioners in caring for their patients and/or to serve consumers viewing this service as a supplement to, and not a substitute for, the expertise, skill, knowledge and judgment of healthcare practitioners. The absence of a warning for a given drug or drug combination in no way should be construed to indicate that the drug or drug combination is safe, effective or appropriate for any given patient. [facility] does not assume any responsibility for any aspect of healthcare administered with the aid of information [facility] provides. The information contained herein is not intended to cover all possible uses, directions, precautions, warnings, drug interactions, allergic reactions, or adverse effects. If you have questions about the drugs you are taking, check with your doctor, nurse or pharmacist.  Copyright 9243-0478 Mg [address] [place] [address]. Version: 8.02. Revision date: 12/5/2017. Care instructions adapted under license by [facility] [address]  If you have questions about a medical condition or this instruction, always ask your healthcare professional. [address] bloody stools in a nursing baby. Tell your doctor if you are breast-feeding. Sulfasalazine is not approved for use by anyone younger than 3years old. How should I take sulfasalazine? Follow all directions on your prescription label and read all medication guides or instruction sheets. Use the medicine exactly as directed. Take this medicine after a meal.  Swallow the tablet whole and do not crush, chew, or break it. You will need frequent medical tests. Drink plenty of liquids to keep your kidneys working properly while you are taking sulfasalazine. Sulfasalazine can affect the results of certain medical tests. Tell any doctor who treats you that you are using sulfasalazine. Sulfasalazine may cause your skin or urine to appear orange-yellow in color. Call your doctor if you also have yellowing of your eyes, brown urine, or stomach pain. These may be signs of liver problems. If you are treating arthritis, do not stop using any of your other arthritis medicines until your doctor tells you to. Sulfasalazine may not improve your symptoms right away, and you may still need your other medicines for awhile. Sulfasalazine is only part of a complete treatment for rheumatoid arthritis that may also include rest and physical therapy. Follow your doctor's instructions very closely. Store at room temperature away from moisture and heat. What happens if I miss a dose? Take the medicine as soon as you can, but skip the missed dose if it is almost time for your next dose. Do not take two doses at one time. What happens if I overdose? Seek emergency medical attention or call the Poison Help line at 1-282.834.3060. Overdose symptoms may include vomiting, stomach pain, drowsiness, or seizure. What should I avoid while taking sulfasalazine? Follow your doctor's instructions about any restrictions on food, beverages, or activity. What are the possible side effects of sulfasalazine?   Get emergency medical help if you have signs of an allergic reaction (hives, difficult breathing, swelling in your face or throat) or a severe skin reaction (fever, sore throat, burning eyes, skin pain, red or purple skin rash with blistering and peeling). Seek medical treatment if you have a serious drug reaction that can affect many parts of your body. Symptoms may include: skin rash, fever, swollen glands, muscle aches, severe weakness, unusual bruising, or yellowing of your skin or eyes. You may get infections more easily, even serious or fatal infections. Call your doctor right away if you have signs of infection such as:  · fever, chills, sore throat;  · mouth sores, red or swollen gums;  · pale skin, easy bruising, unusual bleeding; or  · chest discomfort, wheezing, dry cough or hack, rapid weight loss. Also call your doctor at once if you have:  · fever with headache, rash, and vomiting;  · a skin rash, no matter how mild;  · severe nausea or vomiting when you first start taking sulfasalazine;  · little or no urination, urine that looks foamy;  · puffy eyes, swelling in your ankles or feet, weight gain; or  · liver problems --loss of appetite, stomach pain (upper right side), dark urine, jaundice (yellowing of the skin or eyes). Common side effects may include:  · nausea, vomiting, upset stomach, loss of appetite;  · headache;  · rash; or  · low sperm count in men. This is not a complete list of side effects and others may occur. Call your doctor for medical advice about side effects. You may report side effects to FDA at 6-556-FDA-9934. What other drugs will affect sulfasalazine? Other drugs may affect sulfasalazine, including prescription and over-the-counter medicines, vitamins, and herbal products. Tell your doctor about all your current medicines and any medicine you start or stop using. Where can I get more information? Your pharmacist can provide more information about sulfasalazine.   Remember, keep this and all other medicines out of the reach of children, never share your medicines with others, and use this medication only for the indication prescribed. Every effort has been made to ensure that the information provided by [address] is accurate, up-to-date, and complete, but no guarantee is made to that effect. Drug information contained herein may be time sensitive. [facility] information has been compiled for use by healthcare practitioners and consumers in the [place] and therefore [facility] does not warrant that uses outside of the [place] are appropriate, unless specifically indicated otherwise. [facility]'s drug information does not endorse drugs, diagnose patients or recommend therapy. [facility]'s drug information is an informational resource designed to assist licensed healthcare practitioners in caring for their patients and/or to serve consumers viewing this service as a supplement to, and not a substitute for, the expertise, skill, knowledge and judgment of healthcare practitioners. The absence of a warning for a given drug or drug combination in no way should be construed to indicate that the drug or drug combination is safe, effective or appropriate for any given patient. [facility] does not assume any responsibility for any aspect of healthcare administered with the aid of information [facility] provides. The information contained herein is not intended to cover all possible uses, directions, precautions, warnings, drug interactions, allergic reactions, or adverse effects. If you have questions about the drugs you are taking, check with your doctor, nurse or pharmacist.  Copyright 0079-5422 [address]. Version: 6.01. Revision date: 11/9/2018. Care instructions adapted under license by [facility] ([facility]).  If you have questions about a medical condition or this instruction, always ask your healthcare professional. [place] [address] any warranty or liability for your use of this information.

## 2019-05-10 NOTE — PROGRESS NOTES
[address], [place]                                                           [address], [address] [facility]                                                              (H) [phone] (F)      Dear Dr. Ravindra Gilbert MD:  Please find Rheumatology assessment. Thank you for giving me the opportunity to be involved in [facility] care and I look forward following May Garcia along with you. If you have any questions or concerns please feel free to reach me. Note is transcribed using voice recognition software. Inadvertent computerized transcription errors may be present. Patient identification: May Mata,: 1960,59 y.o. Sex: female     A/P  May Garcia was seen today for follow-up. Diagnoses and all orders for this visit:    Inflammatory polyarthritis ([address]  -     C-Reactive Protein; Future  -     Sedimentation Rate; Future    Primary osteoarthritis involving multiple joints      Inflammatory arthritis-much better, continues to have intermittent arthralgias in her finger joints, wrist, feet. No overt flares. Is taking 5 mg of prednisone at this time, has 5 more days to finish. Onset of inflammatory arthritis-end of 2019. Labs-low titer RF-26, negative CCP. Groin, neck pain is likely from osteoarthritis. Plan-  Recheck inflammatory markers, if still elevated, recommend hydroxychloroquine and sulfasalazine. Briefly discussed about medications, reading information is provided. If her inflammatory markers are normal, finish prednisone taper, and we will clinically monitor. She is advised to call me with worsening symptoms, follow-up in 6-8 weeks. She has list of questions, all answered.   Time spent with patient is 25 minutes, >15 minutes was spent explaining blood work, medical diagnoses, treatment plan as above. Patient indicates understanding and agrees with the management plan. I reviewed patient's history, referral documents and electronic medical records. Copy of consult note is being routed electronically/faxed to referring physician. #######################################################################      Lhtewqiesx-ewpihh-lz for inflammatory arthritis, low titer rheumatoid factor, and known history of generalized osteoarthritis. States that she is doing much better than when she saw me last time, still does not feel quite normal.  She is not sure if she is back to baseline, as she always had intercurrent arthralgias in different areas at different times. She continues to have hip pain-in her groin, neck pain, and intermittent discomfort in her finger joints mainly PIPs and MCPs. No swelling, specific a.m. stiffness. She is tolerating medications well, is taking 5 mg of prednisone daily. No psoriasis or inflammatory bowel diseases. No known viral infections. All other review of systems are negative. Labs-other than low titer RF, elevated inflammatory markers, normal labs. All other ROS are negative. Past Medical History:   Diagnosis Date    Allergic rhinitis, cause unspecified     chronic    Anxiety state, unspecified     Blepharophimosis     Contact dermatitis and other eczema, due to unspecified cause     Depressive disorder, not elsewhere classified     Headache(784.0)     Insomnia, unspecified     Obesity, unspecified     Other and unspecified hyperlipidemia     Other specified congenital anomaly of skin     Pain in both feet 12/17/2015    S/p fracture 2015     Screening colonoscopy 6/2014    normal-repeat 10 yrs     Unspecified sinusitis (chronic)      Past Surgical History:   Procedure Laterality Date    COLONOSCOPY  7/21/2014    repeat 10 yrs.    [place]    for prolapse partial    LIPOMA RESECTION  2007 left thigh     Social History     Socioeconomic History    Marital status:      Spouse name: Not on file    Number of children: Not on file    Years of education: Not on file    Highest education level: Not on file   Occupational History    Not on file   Social Needs    Financial resource strain: Not on file    Food insecurity:     Worry: Not on file     Inability: Not on file    Transportation needs:     Medical: Not on file     Non-medical: Not on file   Tobacco Use    Smoking status: Never Smoker    Smokeless tobacco: Never Used   Substance and Sexual Activity    Alcohol use: Yes     Alcohol/week: 0.0 oz     Comment: occasionally    Drug use: No    Sexual activity: Not Currently   Lifestyle    Physical activity:     Days per week: Not on file     Minutes per session: Not on file    Stress: Not on file   Relationships    Social connections:     Talks on phone: Not on file     Gets together: Not on file     Attends Christianity service: Not on file     Active member of club or organization: Not on file     Attends meetings of clubs or organizations: Not on file     Relationship status: Not on file    Intimate partner violence:     Fear of current or ex partner: Not on file     Emotionally abused: Not on file     Physically abused: Not on file     Forced sexual activity: Not on file   Other Topics Concern    Not on file   Social History Narrative    Not on file       No family history of autoimmune diseases    Current Outpatient Medications   Medication Sig Dispense Refill    predniSONE (DELTASONE) 10 MG tablet 2 tab po daily x 7 days. 1.5 tab po daily x 7 days. 1 tab po daily x 7 days. 1/2 tab po daily 7 days and stop.  35 tablet 0    promethazine-codeine (PHENERGAN WITH CODEINE) 6.25-10 MG/5ML syrup Take 5 mLs by mouth every 4 hours as needed for Cough 180 mL 1    beclomethasone (QNASL) 80 MCG/ACT AERS nasal spray USE 2 SPRAYS IN EACH NOSTRIL EVERY DAY 8.7 g 12    butalbital-acetaminophen-caffeine (FIORICET, ESGIC) -40 MG per tablet TAKE 1 TABLET BY MOUTH EVERY 6 HOURS AS NEEDED FOR HEADACHE 30 tablet 3    ketoconazole (NIZORAL) 2 % cream APPLY TWICE DAILY 60 g 12    ammonium lactate (AMLACTIN) 12 % cream APPLY TOPICALLY AS NEEDED 280 g 0    oxymetazoline (AFRIN) 0.05 % nasal spray 2 sprays by Nasal route 2 times daily.  malathion (OVIDE) 0.5 % lotion Apply topically once. 1 Bottle 3    mometasone (ELOCON) 0.1 % cream Apply  topically daily. No current facility-administered medications for this visit. No Known Allergies    PHYSICAL EXAM:    Vitals:    /78   Ht 5' 4\" (1.626 m)   Wt 223 lb (101.2 kg)   BMI 38.28 kg/m²   General appearance/ Psychiatric: well nourished, and well groomed, normal judgement, alert, appears stated age and cooperative. MKS: 28 joint JOINT COUNT:                               Right                                                  Left   Swell Tender Swell Tender   PIP1           PIP2    x    x   PIP3           PIP4          PIP5    x      MCP1           MCP2    x       MCP3           MCP4           MCP5           Wrist    x       Elbow           Shoulder          Knee             Full fist bilaterally without any tenderness. Ankle and feet joints are non-tender. Shoulders have full range of motion. Otherwise, normal musculoskeletal examination in upper and lower extremities. Mild subjective arthralgias across MCPs and PIPs bilaterally. Skin: No rashes, no induration or skin thickening or nodules. No evidence ischemia or deformities noted in digits or nails.        DATA:   Lab Results   Component Value Date    WBC 7.4 04/08/2019    HGB 12.7 04/08/2019    HCT 37.0 04/08/2019    MCV 90.2 04/08/2019     04/08/2019         Chemistry        Component Value Date/Time     04/08/2019 1244    K 4.3 04/08/2019 1244     04/08/2019 1244    CO2 26 04/08/2019 1244    BUN 11 04/08/2019 1244    CREATININE 0.5 (L) 04/08/2019 1244        Component Value Date/Time    CALCIUM 9.0 04/08/2019 1244    ALKPHOS 93 04/08/2019 1244    AST 20 04/08/2019 1244    ALT 33 04/08/2019 1244    BILITOT 0.4 04/08/2019 1244           Lab Results   Component Value Date    SEDRATE 92 (H) 04/08/2019     No results found for: CKTOTAL  Lab Results   Component Value Date    TSH 1.89 04/08/2019     Lab Results   Component Value Date    VITD25 31.5 09/21/2018       A/P- See above.

## 2019-05-13 ENCOUNTER — TELEPHONE (OUTPATIENT)
Dept: INTERNAL MEDICINE CLINIC | Age: 59
End: 2019-05-13

## 2019-05-20 ENCOUNTER — TELEPHONE (OUTPATIENT)
Dept: RHEUMATOLOGY | Age: 59
End: 2019-05-20

## 2019-05-20 RX ORDER — HYDROXYCHLOROQUINE SULFATE 200 MG/1
TABLET, FILM COATED ORAL
Qty: 60 TABLET | Refills: 1 | Status: SHIPPED | OUTPATIENT
Start: 2019-05-20 | End: 2019-11-08 | Stop reason: CLARIF

## 2019-05-20 NOTE — TELEPHONE ENCOUNTER
Patient called about medication hydroxychloroine and sulfasalazine. She back from vacation and wanted to know which one you want her to start ?

## 2019-06-18 ENCOUNTER — OFFICE VISIT (OUTPATIENT)
Dept: RHEUMATOLOGY | Age: 59
End: 2019-06-18
Payer: COMMERCIAL

## 2019-06-18 VITALS
WEIGHT: 223 LBS | DIASTOLIC BLOOD PRESSURE: 82 MMHG | SYSTOLIC BLOOD PRESSURE: 124 MMHG | BODY MASS INDEX: 38.07 KG/M2 | HEIGHT: 64 IN

## 2019-06-18 DIAGNOSIS — M06.4 INFLAMMATORY POLYARTHRITIS (HCC): Primary | ICD-10-CM

## 2019-06-18 DIAGNOSIS — R21 DIFFUSE PAPULAR RASH: ICD-10-CM

## 2019-06-18 PROCEDURE — 3017F COLORECTAL CA SCREEN DOC REV: CPT | Performed by: INTERNAL MEDICINE

## 2019-06-18 PROCEDURE — 99214 OFFICE O/P EST MOD 30 MIN: CPT | Performed by: INTERNAL MEDICINE

## 2019-06-18 PROCEDURE — 1036F TOBACCO NON-USER: CPT | Performed by: INTERNAL MEDICINE

## 2019-06-18 PROCEDURE — G8417 CALC BMI ABV UP PARAM F/U: HCPCS | Performed by: INTERNAL MEDICINE

## 2019-06-18 PROCEDURE — G8427 DOCREV CUR MEDS BY ELIG CLIN: HCPCS | Performed by: INTERNAL MEDICINE

## 2019-06-18 NOTE — PROGRESS NOTES
[address], [place]                                                           [address] [facility], [facility]                                                              (S) [phone] (F)      Dear Dr. Kaylie Palacios MD:  Please find Rheumatology assessment. Thank you for giving me the opportunity to be involved in [facility] care and I look forward following Amalia Keen along with you. If you have any questions or concerns please feel free to reach me. Note is transcribed using voice recognition software. Inadvertent computerized transcription errors may be present. Patient identification: Amalia Mata,: 1960,59 y.o. Sex: female     A/P  Amalia Keen was seen today for rash. Diagnoses and all orders for this visit:    Inflammatory polyarthritis ([address]    Diffuse papular rash      Diffuse maculopapular rash-extremely pruritic, burning-1 week duration-likely from hydroxychloroquine. She showed me the skin rash when she brought her daughter for an appointment with me, at that time we discontinued hydroxychloroquine. Inflammatory arthritis is in remission at this time. Not on prednisone. Onset of inflammatory arthritis-end of 2019. Labs-low titer RF-26, negative CCP. Mild intercurrent arthralgias mainly in the neck, CMC joint is from osteoarthritis. Plan-  Reassured patient that the rash is likely from hydroxychloroquine. Continue Claritin and Benadryl for now. If symptoms get worse, call me. Since joints are doing well at this time, will monitor clinically, call me with any flares. Patient indicates understanding and agrees with the management plan. I reviewed patient's history, referral documents and electronic medical records.   Copy of consult note Occupational History    Not on file   Social Needs    Financial resource strain: Not on file    Food insecurity:     Worry: Not on file     Inability: Not on file    Transportation needs:     Medical: Not on file     Non-medical: Not on file   Tobacco Use    Smoking status: Never Smoker    Smokeless tobacco: Never Used   Substance and Sexual Activity    Alcohol use: Yes     Alcohol/week: 0.0 oz     Comment: occasionally    Drug use: No    Sexual activity: Not Currently   Lifestyle    Physical activity:     Days per week: Not on file     Minutes per session: Not on file    Stress: Not on file   Relationships    Social connections:     Talks on phone: Not on file     Gets together: Not on file     Attends Confucianist service: Not on file     Active member of club or organization: Not on file     Attends meetings of clubs or organizations: Not on file     Relationship status: Not on file    Intimate partner violence:     Fear of current or ex partner: Not on file     Emotionally abused: Not on file     Physically abused: Not on file     Forced sexual activity: Not on file   Other Topics Concern    Not on file   Social History Narrative    Not on file       No family history of autoimmune diseases    Current Outpatient Medications   Medication Sig Dispense Refill    hydroxychloroquine (PLAQUENIL) 200 MG tablet Take 1 tab po BID 60 tablet 1    predniSONE (DELTASONE) 10 MG tablet 2 tab po daily x 7 days. 1.5 tab po daily x 7 days. 1 tab po daily x 7 days. 1/2 tab po daily 7 days and stop.  35 tablet 0    promethazine-codeine (PHENERGAN WITH CODEINE) 6.25-10 MG/5ML syrup Take 5 mLs by mouth every 4 hours as needed for Cough 180 mL 1    beclomethasone (QNASL) 80 MCG/ACT AERS nasal spray USE 2 SPRAYS IN EACH NOSTRIL EVERY DAY 8.7 g 12    butalbital-acetaminophen-caffeine (FIORICET, ESGIC) -40 MG per tablet TAKE 1 TABLET BY MOUTH EVERY 6 HOURS AS NEEDED FOR HEADACHE 30 tablet 3    ketoconazole 1244           Lab Results   Component Value Date    SEDRATE 32 (H) 05/10/2019     No results found for: CKTOTAL  Lab Results   Component Value Date    TSH 1.89 04/08/2019     Lab Results   Component Value Date    VITD25 31.5 09/21/2018       A/P- See above.

## 2019-07-23 DIAGNOSIS — F41.9 ANXIETY: ICD-10-CM

## 2019-07-23 RX ORDER — LORAZEPAM 1 MG/1
TABLET ORAL
Qty: 30 TABLET | Refills: 1 | OUTPATIENT
Start: 2019-07-23 | End: 2019-11-11 | Stop reason: SDUPTHER

## 2019-11-07 ENCOUNTER — TELEPHONE (OUTPATIENT)
Dept: INTERNAL MEDICINE CLINIC | Age: 59
End: 2019-11-07

## 2019-11-08 ENCOUNTER — OFFICE VISIT (OUTPATIENT)
Dept: RHEUMATOLOGY | Age: 59
End: 2019-11-08
Payer: COMMERCIAL

## 2019-11-08 ENCOUNTER — NURSE ONLY (OUTPATIENT)
Dept: INTERNAL MEDICINE CLINIC | Age: 59
End: 2019-11-08
Payer: COMMERCIAL

## 2019-11-08 VITALS
WEIGHT: 223 LBS | BODY MASS INDEX: 38.07 KG/M2 | HEIGHT: 64 IN | DIASTOLIC BLOOD PRESSURE: 82 MMHG | SYSTOLIC BLOOD PRESSURE: 124 MMHG

## 2019-11-08 DIAGNOSIS — M25.50 POLYARTHRALGIA: Primary | ICD-10-CM

## 2019-11-08 DIAGNOSIS — M25.50 POLYARTHRALGIA: ICD-10-CM

## 2019-11-08 DIAGNOSIS — Z23 NEED FOR INFLUENZA VACCINATION: Primary | ICD-10-CM

## 2019-11-08 LAB
ALBUMIN SERPL-MCNC: 4.3 G/DL (ref 3.4–5)
ALP BLD-CCNC: 90 U/L (ref 40–129)
ALT SERPL-CCNC: 46 U/L (ref 10–40)
AST SERPL-CCNC: 26 U/L (ref 15–37)
BASOPHILS ABSOLUTE: 0 K/UL (ref 0–0.2)
BASOPHILS RELATIVE PERCENT: 0.4 %
BILIRUB SERPL-MCNC: 0.4 MG/DL (ref 0–1)
BILIRUBIN DIRECT: <0.2 MG/DL (ref 0–0.3)
BILIRUBIN, INDIRECT: ABNORMAL MG/DL (ref 0–1)
C-REACTIVE PROTEIN: 8.6 MG/L (ref 0–5.1)
CREAT SERPL-MCNC: 0.6 MG/DL (ref 0.6–1.1)
EOSINOPHILS ABSOLUTE: 0.1 K/UL (ref 0–0.6)
EOSINOPHILS RELATIVE PERCENT: 2.1 %
GFR AFRICAN AMERICAN: >60
GFR NON-AFRICAN AMERICAN: >60
HCT VFR BLD CALC: 42.5 % (ref 36–48)
HEMOGLOBIN: 14.2 G/DL (ref 12–16)
LYMPHOCYTES ABSOLUTE: 2.4 K/UL (ref 1–5.1)
LYMPHOCYTES RELATIVE PERCENT: 35.6 %
MCH RBC QN AUTO: 30.3 PG (ref 26–34)
MCHC RBC AUTO-ENTMCNC: 33.5 G/DL (ref 31–36)
MCV RBC AUTO: 90.5 FL (ref 80–100)
MONOCYTES ABSOLUTE: 0.5 K/UL (ref 0–1.3)
MONOCYTES RELATIVE PERCENT: 7.7 %
NEUTROPHILS ABSOLUTE: 3.6 K/UL (ref 1.7–7.7)
NEUTROPHILS RELATIVE PERCENT: 54.2 %
PDW BLD-RTO: 12.9 % (ref 12.4–15.4)
PLATELET # BLD: 213 K/UL (ref 135–450)
PMV BLD AUTO: 10.8 FL (ref 5–10.5)
RBC # BLD: 4.7 M/UL (ref 4–5.2)
SEDIMENTATION RATE, ERYTHROCYTE: 27 MM/HR (ref 0–30)
TOTAL PROTEIN: 6.8 G/DL (ref 6.4–8.2)
WBC # BLD: 6.7 K/UL (ref 4–11)

## 2019-11-08 PROCEDURE — 90686 IIV4 VACC NO PRSV 0.5 ML IM: CPT | Performed by: INTERNAL MEDICINE

## 2019-11-08 PROCEDURE — G8427 DOCREV CUR MEDS BY ELIG CLIN: HCPCS | Performed by: INTERNAL MEDICINE

## 2019-11-08 PROCEDURE — 90471 IMMUNIZATION ADMIN: CPT | Performed by: INTERNAL MEDICINE

## 2019-11-08 PROCEDURE — 3017F COLORECTAL CA SCREEN DOC REV: CPT | Performed by: INTERNAL MEDICINE

## 2019-11-08 PROCEDURE — G8417 CALC BMI ABV UP PARAM F/U: HCPCS | Performed by: INTERNAL MEDICINE

## 2019-11-08 PROCEDURE — G8482 FLU IMMUNIZE ORDER/ADMIN: HCPCS | Performed by: INTERNAL MEDICINE

## 2019-11-08 PROCEDURE — 1036F TOBACCO NON-USER: CPT | Performed by: INTERNAL MEDICINE

## 2019-11-08 PROCEDURE — 99214 OFFICE O/P EST MOD 30 MIN: CPT | Performed by: INTERNAL MEDICINE

## 2019-11-11 DIAGNOSIS — F41.9 ANXIETY: ICD-10-CM

## 2019-11-11 RX ORDER — LORAZEPAM 1 MG/1
TABLET ORAL
Qty: 30 TABLET | Refills: 0 | Status: SHIPPED | OUTPATIENT
Start: 2019-11-11 | End: 2019-12-11

## 2019-11-13 RX ORDER — SULFASALAZINE 500 MG/1
500 TABLET ORAL 2 TIMES DAILY
Qty: 60 TABLET | Refills: 2 | Status: SHIPPED | OUTPATIENT
Start: 2019-11-13 | End: 2020-05-14 | Stop reason: SDUPTHER

## 2019-11-19 DIAGNOSIS — G43.809 OTHER MIGRAINE WITHOUT STATUS MIGRAINOSUS, NOT INTRACTABLE: ICD-10-CM

## 2019-11-19 RX ORDER — BUTALBITAL, ACETAMINOPHEN AND CAFFEINE 50; 325; 40 MG/1; MG/1; MG/1
TABLET ORAL
Qty: 30 TABLET | Refills: 3 | Status: SHIPPED | OUTPATIENT
Start: 2019-11-19 | End: 2021-02-25

## 2020-01-27 DIAGNOSIS — R73.9 HYPERGLYCEMIA: ICD-10-CM

## 2020-01-27 DIAGNOSIS — E78.00 PURE HYPERCHOLESTEROLEMIA: ICD-10-CM

## 2020-01-27 DIAGNOSIS — M05.79 RHEUMATOID ARTHRITIS INVOLVING MULTIPLE SITES WITH POSITIVE RHEUMATOID FACTOR (HCC): ICD-10-CM

## 2020-01-27 DIAGNOSIS — E55.9 VITAMIN D DEFICIENCY: ICD-10-CM

## 2020-01-27 DIAGNOSIS — Z00.00 WELL ADULT EXAM: ICD-10-CM

## 2020-01-27 LAB
A/G RATIO: 1.8 (ref 1.1–2.2)
ALBUMIN SERPL-MCNC: 4.6 G/DL (ref 3.4–5)
ALP BLD-CCNC: 90 U/L (ref 40–129)
ALT SERPL-CCNC: 66 U/L (ref 10–40)
ANION GAP SERPL CALCULATED.3IONS-SCNC: 17 MMOL/L (ref 3–16)
AST SERPL-CCNC: 34 U/L (ref 15–37)
BASOPHILS ABSOLUTE: 0 K/UL (ref 0–0.2)
BASOPHILS RELATIVE PERCENT: 0.2 %
BILIRUB SERPL-MCNC: 0.6 MG/DL (ref 0–1)
BUN BLDV-MCNC: 15 MG/DL (ref 7–20)
CALCIUM SERPL-MCNC: 10 MG/DL (ref 8.3–10.6)
CHLORIDE BLD-SCNC: 103 MMOL/L (ref 99–110)
CHOLESTEROL, TOTAL: 212 MG/DL (ref 0–199)
CO2: 23 MMOL/L (ref 21–32)
CREAT SERPL-MCNC: 0.7 MG/DL (ref 0.6–1.1)
CREATININE URINE: 279 MG/DL (ref 28–259)
EOSINOPHILS ABSOLUTE: 0.1 K/UL (ref 0–0.6)
EOSINOPHILS RELATIVE PERCENT: 1.5 %
GFR AFRICAN AMERICAN: >60
GFR NON-AFRICAN AMERICAN: >60
GLOBULIN: 2.5 G/DL
GLUCOSE BLD-MCNC: 112 MG/DL (ref 70–99)
HCT VFR BLD CALC: 43.8 % (ref 36–48)
HDLC SERPL-MCNC: 50 MG/DL (ref 40–60)
HEMOGLOBIN: 14.7 G/DL (ref 12–16)
LDL CHOLESTEROL CALCULATED: 132 MG/DL
LYMPHOCYTES ABSOLUTE: 2.3 K/UL (ref 1–5.1)
LYMPHOCYTES RELATIVE PERCENT: 35.1 %
MCH RBC QN AUTO: 30.9 PG (ref 26–34)
MCHC RBC AUTO-ENTMCNC: 33.6 G/DL (ref 31–36)
MCV RBC AUTO: 91.8 FL (ref 80–100)
MICROALBUMIN UR-MCNC: 2.1 MG/DL
MICROALBUMIN/CREAT UR-RTO: 7.5 MG/G (ref 0–30)
MONOCYTES ABSOLUTE: 0.6 K/UL (ref 0–1.3)
MONOCYTES RELATIVE PERCENT: 8.7 %
NEUTROPHILS ABSOLUTE: 3.5 K/UL (ref 1.7–7.7)
NEUTROPHILS RELATIVE PERCENT: 54.5 %
PDW BLD-RTO: 12.9 % (ref 12.4–15.4)
PLATELET # BLD: 191 K/UL (ref 135–450)
PMV BLD AUTO: 11.1 FL (ref 5–10.5)
POTASSIUM SERPL-SCNC: 4.3 MMOL/L (ref 3.5–5.1)
RBC # BLD: 4.76 M/UL (ref 4–5.2)
SODIUM BLD-SCNC: 143 MMOL/L (ref 136–145)
TOTAL PROTEIN: 7.1 G/DL (ref 6.4–8.2)
TRIGL SERPL-MCNC: 148 MG/DL (ref 0–150)
TSH SERPL DL<=0.05 MIU/L-ACNC: 2.59 UIU/ML (ref 0.27–4.2)
VLDLC SERPL CALC-MCNC: 30 MG/DL
WBC # BLD: 6.4 K/UL (ref 4–11)

## 2020-01-28 LAB
ESTIMATED AVERAGE GLUCOSE: 116.9 MG/DL
HBA1C MFR BLD: 5.7 %

## 2020-02-03 ENCOUNTER — OFFICE VISIT (OUTPATIENT)
Dept: INTERNAL MEDICINE CLINIC | Age: 60
End: 2020-02-03
Payer: COMMERCIAL

## 2020-02-03 VITALS
HEIGHT: 64 IN | BODY MASS INDEX: 39.61 KG/M2 | WEIGHT: 232 LBS | DIASTOLIC BLOOD PRESSURE: 88 MMHG | SYSTOLIC BLOOD PRESSURE: 138 MMHG

## 2020-02-03 PROBLEM — B34.9 VIRAL SYNDROME: Status: RESOLVED | Noted: 2019-04-08 | Resolved: 2020-02-03

## 2020-02-03 PROCEDURE — G8482 FLU IMMUNIZE ORDER/ADMIN: HCPCS | Performed by: INTERNAL MEDICINE

## 2020-02-03 PROCEDURE — 99396 PREV VISIT EST AGE 40-64: CPT | Performed by: INTERNAL MEDICINE

## 2020-02-03 ASSESSMENT — ENCOUNTER SYMPTOMS
ABDOMINAL PAIN: 0
COLOR CHANGE: 0
BACK PAIN: 0
WHEEZING: 0
CHEST TIGHTNESS: 0

## 2020-02-03 ASSESSMENT — PATIENT HEALTH QUESTIONNAIRE - PHQ9
2. FEELING DOWN, DEPRESSED OR HOPELESS: 0
SUM OF ALL RESPONSES TO PHQ QUESTIONS 1-9: 0
SUM OF ALL RESPONSES TO PHQ QUESTIONS 1-9: 0
1. LITTLE INTEREST OR PLEASURE IN DOING THINGS: 0
SUM OF ALL RESPONSES TO PHQ9 QUESTIONS 1 & 2: 0

## 2020-02-03 NOTE — PROGRESS NOTES
Subjective:      Patient ID: Janessa Stone is a 64 y.o. female. Chief Complaint   Patient presents with    Annual Exam     YEARLY/refills  needed,hearing (L) SPOT ON R SIDE OF FACE,tongue issues     To have eyelid surgery soon- otherwise feels well-bp's well controlled at home- still working on diet and weight-joint pain well controlled  -really cutting down on sugars-RA well controlled w current regimen       Janessa Stone  1960    Allergies   Allergen Reactions    Plaquenil [Hydroxychloroquine Sulfate] Rash     Current Outpatient Medications   Medication Sig Dispense Refill    beclomethasone (QNASL) 80 MCG/ACT AERS nasal spray USE 2 SPRAYS IN EACH NOSTRIL EVERY DAY 8.7 g 12    butalbital-acetaminophen-caffeine (FIORICET, ESGIC) -40 MG per tablet TAKE 1 TABLET BY MOUTH EVERY 6 HOURS AS NEEDED FOR HEADACHE 30 tablet 3    sulfaSALAzine (AZULFIDINE) 500 MG tablet Take 1 tablet by mouth 2 times daily Take 1 tab twice a day for week 1. Increase 2 tab twice a day thereafter as tolerated. 60 tablet 2    promethazine-codeine (PHENERGAN WITH CODEINE) 6.25-10 MG/5ML syrup Take 5 mLs by mouth every 4 hours as needed for Cough 180 mL 1    ketoconazole (NIZORAL) 2 % cream APPLY TWICE DAILY 60 g 12    ammonium lactate (AMLACTIN) 12 % cream APPLY TOPICALLY AS NEEDED 280 g 0    oxymetazoline (AFRIN) 0.05 % nasal spray 2 sprays by Nasal route 2 times daily.  malathion (OVIDE) 0.5 % lotion Apply topically once. 1 Bottle 3    mometasone (ELOCON) 0.1 % cream Apply  topically daily. No current facility-administered medications for this visit. Vitals:    02/03/20 1119 02/03/20 1127   BP: 138/88 138/88   Weight: 232 lb (105.2 kg)    Height: 5' 4\" (1.626 m)      Body mass index is 39.82 kg/m².      Wt Readings from Last 3 Encounters:   02/03/20 232 lb (105.2 kg)   11/08/19 223 lb (101.2 kg)   06/18/19 223 lb (101.2 kg)     BP Readings from Last 3 Encounters:   02/03/20 138/88   11/08/19 124/82   06/18/19 124/82         Immunization History   Administered Date(s) Administered    DTaP 05/07/2003    Influenza 10/29/2010    Influenza, High Dose (Fluzone 65 yrs and older) 09/28/2018    Influenza, Intradermal, Preservative free 11/30/2012, 11/11/2013, 12/15/2015    Influenza, Intradermal, Quadrivalent, Preservative Free 09/12/2016    Influenza, Quadv, IM, PF (6 mo and older Fluzone, Flulaval, Fluarix, and 3 yrs and older Afluria) 11/08/2019    Tdap (Boostrix, Adacel) 11/11/2013    Zoster Recombinant (Shingrix) 11/28/2019       Past Medical History:   Diagnosis Date    Allergic rhinitis, cause unspecified     chronic    Anxiety state, unspecified     Blepharophimosis     Contact dermatitis and other eczema, due to unspecified cause     Depressive disorder, not elsewhere classified     Headache(784.0)     Insomnia, unspecified     Obesity, unspecified     Other and unspecified hyperlipidemia     Other specified congenital anomaly of skin     Pain in both feet 12/17/2015    S/p fracture 2015     Screening colonoscopy 6/2014    normal-repeat 10 yrs     Unspecified sinusitis (chronic)      Past Surgical History:   Procedure Laterality Date    COLONOSCOPY  7/21/2014    repeat 10 yrs.     HYSTERECTOMY  2000    for prolapse partial    LIPOMA RESECTION  2007    left thigh     Family History   Problem Relation Age of Onset    Lung Cancer Father      Social History     Socioeconomic History    Marital status:      Spouse name: Not on file    Number of children: Not on file    Years of education: Not on file    Highest education level: Not on file   Occupational History    Not on file   Social Needs    Financial resource strain: Not on file    Food insecurity:     Worry: Not on file     Inability: Not on file    Transportation needs:     Medical: Not on file     Non-medical: Not on file   Tobacco Use    Smoking status: Never Smoker    Smokeless tobacco: Never Used   Substance and Sexual Activity    Alcohol use: Yes     Alcohol/week: 0.0 standard drinks     Comment: occasionally    Drug use: No    Sexual activity: Not Currently   Lifestyle    Physical activity:     Days per week: Not on file     Minutes per session: Not on file    Stress: Not on file   Relationships    Social connections:     Talks on phone: Not on file     Gets together: Not on file     Attends Religion service: Not on file     Active member of club or organization: Not on file     Attends meetings of clubs or organizations: Not on file     Relationship status: Not on file    Intimate partner violence:     Fear of current or ex partner: Not on file     Emotionally abused: Not on file     Physically abused: Not on file     Forced sexual activity: Not on file   Other Topics Concern    Not on file   Social History Narrative    Not on file             Review of Systems   Constitutional: Negative for activity change, appetite change and fatigue. HENT: Negative for congestion. Eyes: Negative for visual disturbance. Respiratory: Negative for chest tightness and wheezing. Cardiovascular: Negative for chest pain and palpitations. Gastrointestinal: Negative for abdominal pain. Musculoskeletal: Negative for arthralgias and back pain. Skin: Negative for color change and rash. Neurological: Negative for weakness, light-headedness and headaches. Psychiatric/Behavioral: Negative for dysphoric mood and sleep disturbance. The patient is not nervous/anxious. Objective:   Physical Exam  Constitutional:       Appearance: She is well-developed. HENT:      Head: Normocephalic and atraumatic. Eyes:      Conjunctiva/sclera: Conjunctivae normal.      Pupils: Pupils are equal, round, and reactive to light. Neck:      Musculoskeletal: Normal range of motion and neck supple. Cardiovascular:      Rate and Rhythm: Normal rate and regular rhythm. Heart sounds: Normal heart sounds.    Pulmonary:      Effort:

## 2020-02-03 NOTE — PATIENT INSTRUCTIONS
ENT's    Dr. Gaylon Cabot, Dr. Earl Escobar or partners  [phone] or [phone]    Dr. Zion White, Dr. Genevieve Bailey and   Sona Mejia [phone]  [facility]

## 2020-02-21 RX ORDER — LORAZEPAM 1 MG/1
TABLET ORAL
Qty: 30 TABLET | Refills: 0 | Status: SHIPPED | OUTPATIENT
Start: 2020-02-21 | End: 2020-04-06 | Stop reason: SDUPTHER

## 2020-04-08 ENCOUNTER — VIRTUAL VISIT (OUTPATIENT)
Dept: RHEUMATOLOGY | Age: 60
End: 2020-04-08
Payer: COMMERCIAL

## 2020-04-08 PROCEDURE — 3017F COLORECTAL CA SCREEN DOC REV: CPT | Performed by: INTERNAL MEDICINE

## 2020-04-08 PROCEDURE — 99214 OFFICE O/P EST MOD 30 MIN: CPT | Performed by: INTERNAL MEDICINE

## 2020-04-08 PROCEDURE — G8428 CUR MEDS NOT DOCUMENT: HCPCS | Performed by: INTERNAL MEDICINE

## 2020-04-08 RX ORDER — SULFASALAZINE 500 MG/1
TABLET ORAL
Qty: 180 TABLET | Refills: 1 | Status: SHIPPED | OUTPATIENT
Start: 2020-04-08 | End: 2021-02-25

## 2020-04-08 NOTE — PROGRESS NOTES
Substance Use Topics    Alcohol use: Yes     Alcohol/week: 0.0 standard drinks     Comment: occasionally    Drug use: No            PHYSICAL EXAMINATION:  [ INSTRUCTIONS:  \"[x]\" Indicates a positive item  \"[]\" Indicates a negative item  -- DELETE ALL ITEMS NOT EXAMINED]  Vital Signs: (As obtained by patient/caregiver or practitioner observation)    Blood pressure-  Heart rate-    Respiratory rate-    Temperature-  Pulse oximetry-     Constitutional: [x] Appears well-developed and well-nourished [x] No apparent distress      [] Abnormal-   Mental status  [x] Alert and awake  [x] Oriented to person/place/time [x]Able to follow commands      Eyes:  EOM    []  Normal  [] Abnormal-  Sclera  []  Normal  [] Abnormal -         Discharge []  None visible  [] Abnormal -    HENT:   [] Normocephalic, atraumatic. [] Abnormal   [] Mouth/Throat: Mucous membranes are moist.     External Ears [] Normal  [] Abnormal-     Neck: [] No visualized mass     Pulmonary/Chest: [] Respiratory effort normal.  [] No visualized signs of difficulty breathing or respiratory distress        [] Abnormal-      Musculoskeletal:   [x] Normal gait with no signs of ataxia         [x] Normal range of motion of neck        [] Abnormal- No abnormal findings to see in video visit. Neurological:        [] No Facial Asymmetry (Cranial nerve 7 motor function) (limited exam to video visit)          [] No gaze palsy        [] Abnormal-         Skin:        [x] No significant exanthematous lesions or discoloration noted on facial skin         [] Abnormal-            Psychiatric:       [] Normal Affect [] No Hallucinations        [] Abnormal-     Other pertinent observable physical exam findings-     ASSESSMENT/PLAN:  Diagnoses and all orders for this visit:    High risk medication use  -     Creatinine, Serum; Future  -     Hepatic Function Panel; Future  -     C-Reactive Protein; Future  -     Sedimentation Rate;  Future  -     CBC Auto Differential;

## 2020-05-13 ENCOUNTER — PATIENT MESSAGE (OUTPATIENT)
Dept: RHEUMATOLOGY | Age: 60
End: 2020-05-13

## 2020-05-14 RX ORDER — SULFASALAZINE 500 MG/1
1000 TABLET ORAL 2 TIMES DAILY
Qty: 360 TABLET | Refills: 0 | Status: SHIPPED | OUTPATIENT
Start: 2020-05-14 | End: 2020-08-26

## 2020-07-20 RX ORDER — SULFASALAZINE 500 MG/1
TABLET ORAL
Qty: 360 TABLET | Refills: 0 | OUTPATIENT
Start: 2020-07-20

## 2020-07-22 ENCOUNTER — VIRTUAL VISIT (OUTPATIENT)
Dept: RHEUMATOLOGY | Age: 60
End: 2020-07-22
Payer: COMMERCIAL

## 2020-07-22 LAB
ALBUMIN SERPL-MCNC: 4.4 G/DL (ref 3.4–5)
ALP BLD-CCNC: 87 U/L (ref 40–129)
ALT SERPL-CCNC: 37 U/L (ref 10–40)
AST SERPL-CCNC: 23 U/L (ref 15–37)
BASOPHILS ABSOLUTE: 0 K/UL (ref 0–0.2)
BASOPHILS RELATIVE PERCENT: 0.3 %
BILIRUB SERPL-MCNC: 0.3 MG/DL (ref 0–1)
BILIRUBIN DIRECT: <0.2 MG/DL (ref 0–0.3)
BILIRUBIN, INDIRECT: NORMAL MG/DL (ref 0–1)
C-REACTIVE PROTEIN: 8.3 MG/L (ref 0–5.1)
CREAT SERPL-MCNC: 0.5 MG/DL (ref 0.6–1.1)
EOSINOPHILS ABSOLUTE: 0.1 K/UL (ref 0–0.6)
EOSINOPHILS RELATIVE PERCENT: 1.1 %
GFR AFRICAN AMERICAN: >60
GFR NON-AFRICAN AMERICAN: >60
HCT VFR BLD CALC: 41.6 % (ref 36–48)
HEMOGLOBIN: 14 G/DL (ref 12–16)
LYMPHOCYTES ABSOLUTE: 2.7 K/UL (ref 1–5.1)
LYMPHOCYTES RELATIVE PERCENT: 40 %
MCH RBC QN AUTO: 31.3 PG (ref 26–34)
MCHC RBC AUTO-ENTMCNC: 33.7 G/DL (ref 31–36)
MCV RBC AUTO: 92.9 FL (ref 80–100)
MONOCYTES ABSOLUTE: 0.5 K/UL (ref 0–1.3)
MONOCYTES RELATIVE PERCENT: 8.1 %
NEUTROPHILS ABSOLUTE: 3.4 K/UL (ref 1.7–7.7)
NEUTROPHILS RELATIVE PERCENT: 50.5 %
PDW BLD-RTO: 12.6 % (ref 12.4–15.4)
PLATELET # BLD: 193 K/UL (ref 135–450)
PMV BLD AUTO: 10.2 FL (ref 5–10.5)
RBC # BLD: 4.48 M/UL (ref 4–5.2)
SEDIMENTATION RATE, ERYTHROCYTE: 29 MM/HR (ref 0–30)
TOTAL PROTEIN: 6.8 G/DL (ref 6.4–8.2)
WBC # BLD: 6.7 K/UL (ref 4–11)

## 2020-07-22 PROCEDURE — 3017F COLORECTAL CA SCREEN DOC REV: CPT | Performed by: INTERNAL MEDICINE

## 2020-07-22 PROCEDURE — 99214 OFFICE O/P EST MOD 30 MIN: CPT | Performed by: INTERNAL MEDICINE

## 2020-07-22 PROCEDURE — 36415 COLL VENOUS BLD VENIPUNCTURE: CPT | Performed by: INTERNAL MEDICINE

## 2020-07-22 PROCEDURE — G8428 CUR MEDS NOT DOCUMENT: HCPCS | Performed by: INTERNAL MEDICINE

## 2020-07-22 NOTE — PROGRESS NOTES
2020    TELEHEALTH EVALUATION -- Audio/Visual (During CLZNN-04 public health emergency)    HPI:    Vamsi Philippe (:  1960) has requested an audio/video evaluation for the following concern(s): management of RA. General changes-she is doing well in terms of rheumatoid arthritis on sulfasalazine 500 mg twice daily. She tells me that she still has persistent discomfort and stiffness in finger joints however well manageable on current regimen, and does not prefer to increase the dose of sulfasalazine. She is tolerating medications well. ADLs and recreational activities are normal.  No rashes, GI side effects or infections. Is due for labs. All other ROS are negative. Review of Systems    Prior to Visit Medications    Medication Sig Taking? Authorizing Provider   sulfaSALAzine (AZULFIDINE) 500 MG tablet Take 2 tablets by mouth 2 times daily  Kary Greenfield MD   sulfaSALAzine (AZULFIDINE) 500 MG tablet Take 2 tab po daily  Kary Greenfield MD   beclomethasone (QNASL) 80 MCG/ACT AERS nasal spray USE 2 SPRAYS IN EACH NOSTRIL EVERY DAY  Violetta Alvarado, MD szymanski-acetaminophen-caffeine (FIORICET, ESGIC) -40 MG per tablet TAKE 1 TABLET BY MOUTH EVERY 6 HOURS AS NEEDED FOR HEADACHE  Violetta Christiano, MD   promethazine-codeine (PHENERGAN WITH CODEINE) 6.25-10 MG/5ML syrup Take 5 mLs by mouth every 4 hours as needed for Cough  Violetta MD Christiano   ketoconazole (NIZORAL) 2 % cream APPLY TWICE DAILY  Violetta Falling, MD   ammonium lactate (AMLACTIN) 12 % cream APPLY TOPICALLY AS NEEDED  Violetta Falling, MD   oxymetazoline (AFRIN) 0.05 % nasal spray 2 sprays by Nasal route 2 times daily. Historical Provider, MD   malathion (OVIDE) 0.5 % lotion Apply topically once. Violetta Alvarado, MD   mometasone (ELOCON) 0.1 % cream Apply  topically daily. Historical Provider, MD       Social History     Tobacco Use    Smoking status: Never Smoker    Smokeless tobacco: Never Used   Substance Use Topics    Alcohol use:  Yes Alcohol/week: 0.0 standard drinks     Comment: occasionally    Drug use: No            PHYSICAL EXAMINATION:  [ INSTRUCTIONS:  \"[x]\" Indicates a positive item  \"[]\" Indicates a negative item  -- DELETE ALL ITEMS NOT EXAMINED]  Vital Signs: (As obtained by patient/caregiver or practitioner observation)    Blood pressure-  Heart rate-    Respiratory rate-    Temperature-  Pulse oximetry-     Constitutional: [x] Appears well-developed and well-nourished [x] No apparent distress      [] Abnormal-   Mental status  [x] Alert and awake  [x] Oriented to person/place/time [x]Able to follow commands      Eyes:  EOM    []  Normal  [] Abnormal-  Sclera  []  Normal  [] Abnormal -         Discharge []  None visible  [] Abnormal -    HENT:   [] Normocephalic, atraumatic. [] Abnormal   [] Mouth/Throat: Mucous membranes are moist.     External Ears [] Normal  [] Abnormal-     Neck: [] No visualized mass     Pulmonary/Chest: [x] Respiratory effort normal.  [x] No visualized signs of difficulty breathing or respiratory distress        [] Abnormal-      Musculoskeletal:   [x] Normal gait with no signs of ataxia         [x] Normal range of motion of neck        [] Abnormal-   no abnormal findings to be seen in the video visit. Neurological:        [] No Facial Asymmetry (Cranial nerve 7 motor function) (limited exam to video visit)          [] No gaze palsy        [] Abnormal-         Skin:        [x] No significant exanthematous lesions or discoloration noted on facial skin         [] Abnormal-            Psychiatric:       [] Normal Affect [] No Hallucinations        [] Abnormal-     Other pertinent observable physical exam findings-     ASSESSMENT/PLAN:  Jaziel Elder was seen today for follow-up. Diagnoses and all orders for this visit:    Inflammatory polyarthritis ([address]    High risk medication use  -     Vitamin D 25 Hydroxy;  Future  -     CBC Auto Differential  -     Sedimentation Rate  -     C-Reactive Protein  -     Hepatic Function Panel  -     Creatinine, Serum  -     Vitamin D 25 Hydroxy       Rheumatoid arthritis doing well on sulfasalazine 500 mg twice daily. She is due for the labs, recommend doing it at her earliest convenience. Continue current medications. Follow-up in 3 months. No follow-ups on file. Jinger Nageotte is a 64 y.o. female being evaluated by a Virtual Visit (video visit) encounter to address concerns as mentioned above. A caregiver was present when appropriate. Due to this being a TeleHealth encounter (During ZVTRD-03 public health emergency), evaluation of the following organ systems was limited: Vitals/Constitutional/EENT/Resp/CV/GI//MS/Neuro/Skin/Heme-Lymph-Imm. Pursuant to the emergency declaration under the [address] [place] [address] authority and the [employer] and [employer] Act, this Virtual Visit was conducted with patient's (and/or legal guardian's) consent, to reduce the patient's risk of exposure to COVID-19 and provide necessary medical care. The patient (and/or legal guardian) has also been advised to contact this office for worsening conditions or problems, and seek emergency medical treatment and/or call 911 if deemed necessary. Services were provided through a video synchronous discussion virtually to substitute for in-person clinic visit. Patient and provider were located at their individual homes. --Alyssa Mahajan MD on 7/22/2020 at 2:01 PM    An electronic signature was used to authenticate this note.

## 2020-07-23 LAB — VITAMIN D 25-HYDROXY: 35.2 NG/ML

## 2020-08-30 RX ORDER — LORAZEPAM 1 MG/1
TABLET ORAL
Qty: 30 TABLET | Refills: 0 | Status: SHIPPED | OUTPATIENT
Start: 2020-08-30 | End: 2020-10-14

## 2020-10-21 ENCOUNTER — VIRTUAL VISIT (OUTPATIENT)
Dept: RHEUMATOLOGY | Age: 60
End: 2020-10-21
Payer: COMMERCIAL

## 2020-10-21 PROCEDURE — 3017F COLORECTAL CA SCREEN DOC REV: CPT | Performed by: INTERNAL MEDICINE

## 2020-10-21 PROCEDURE — G8427 DOCREV CUR MEDS BY ELIG CLIN: HCPCS | Performed by: INTERNAL MEDICINE

## 2020-10-21 PROCEDURE — 99214 OFFICE O/P EST MOD 30 MIN: CPT | Performed by: INTERNAL MEDICINE

## 2020-10-21 NOTE — PROGRESS NOTES
Provider, MD       Social History     Tobacco Use    Smoking status: Never Smoker    Smokeless tobacco: Never Used   Substance Use Topics    Alcohol use: Yes     Alcohol/week: 0.0 standard drinks     Comment: occasionally    Drug use: No            PHYSICAL EXAMINATION:  [ INSTRUCTIONS:  \"[x]\" Indicates a positive item  \"[]\" Indicates a negative item  -- DELETE ALL ITEMS NOT EXAMINED]  Vital Signs: (As obtained by patient/caregiver or practitioner observation)    Blood pressure-  Heart rate-    Respiratory rate-    Temperature-  Pulse oximetry-     Constitutional: [x] Appears well-developed and well-nourished [] No apparent distress      [] Abnormal-   Mental status  [x] Alert and awake  [x] Oriented to person/place/time [x]Able to follow commands      Eyes:  EOM    []  Normal  [] Abnormal-  Sclera  []  Normal  [] Abnormal -         Discharge []  None visible  [] Abnormal -    HENT:   [] Normocephalic, atraumatic. [] Abnormal   [] Mouth/Throat: Mucous membranes are moist.     External Ears [] Normal  [] Abnormal-     Neck: [] No visualized mass     Pulmonary/Chest: [x] Respiratory effort normal.  [x] No visualized signs of difficulty breathing or respiratory distress        [] Abnormal-      Musculoskeletal:   [x] Normal gait with no signs of ataxia         [x] Normal range of motion of neck        [x] Abnormal-   no findings to note in virtual visit. Neurological:        [] No Facial Asymmetry (Cranial nerve 7 motor function) (limited exam to video visit)          [] No gaze palsy        [] Abnormal-         Skin:        [x] No significant exanthematous lesions or discoloration noted on facial skin         [] Abnormal-            Psychiatric:       [] Normal Affect [] No Hallucinations        [] Abnormal-     Other pertinent observable physical exam findings-     ASSESSMENT/PLAN:  Chika Mckinnon was seen today for follow-up.     Diagnoses and all orders for this visit:    Rheumatoid arthritis involving multiple sites with positive rheumatoid factor (HCC)    High risk medication use  -     CBC Auto Differential; Future  -     Comprehensive Metabolic Panel; Future       RA is doing fairly well on sulfasalazine 1 g a day. Mild intercurrent arthralgias in her ankles. Recommend utilizing NSAIDs. If swelling is persistent with pain, may increase sulfasalazine 1 g twice daily, which she is not keen on doing. Labs within a month, follow-up in 4 months. No follow-ups on file. Barry Lopez is a 64 y.o. female being evaluated by a Virtual Visit (video visit) encounter to address concerns as mentioned above. A caregiver was present when appropriate. Due to this being a TeleHealth encounter ([facility]), evaluation of the following organ systems was limited: Vitals/Constitutional/EENT/Resp/CV/GI//MS/Neuro/Skin/Heme-Lymph-Imm. Pursuant to the emergency declaration under the [address] [place] [address] and the [employer] and [employer] Act, this Virtual Visit was conducted with patient's (and/or legal guardian's) consent, to reduce the patient's risk of exposure to COVID-19 and provide necessary medical care. The patient (and/or legal guardian) has also been advised to contact this office for worsening conditions or problems, and seek emergency medical treatment and/or call 911 if deemed necessary. Services were provided through a video synchronous discussion virtually to substitute for in-person clinic visit. Patient and provider were located at their individual homes. --Enmanuel Zambrano MD on 10/21/2020 at 3:45 PM    An electronic signature was used to authenticate this note.

## 2020-11-24 ENCOUNTER — OFFICE VISIT (OUTPATIENT)
Dept: RHEUMATOLOGY | Age: 60
End: 2020-11-24
Payer: COMMERCIAL

## 2020-11-24 VITALS — TEMPERATURE: 96.7 F

## 2020-11-24 LAB
A/G RATIO: 1.9 (ref 1.1–2.2)
ALBUMIN SERPL-MCNC: 4.5 G/DL (ref 3.4–5)
ALP BLD-CCNC: 87 U/L (ref 40–129)
ALT SERPL-CCNC: 67 U/L (ref 10–40)
ANION GAP SERPL CALCULATED.3IONS-SCNC: 13 MMOL/L (ref 3–16)
AST SERPL-CCNC: 32 U/L (ref 15–37)
BASOPHILS ABSOLUTE: 0 K/UL (ref 0–0.2)
BASOPHILS RELATIVE PERCENT: 0.4 %
BILIRUB SERPL-MCNC: 0.4 MG/DL (ref 0–1)
BUN BLDV-MCNC: 17 MG/DL (ref 7–20)
C-REACTIVE PROTEIN: 6 MG/L (ref 0–5.1)
CALCIUM SERPL-MCNC: 9.5 MG/DL (ref 8.3–10.6)
CHLORIDE BLD-SCNC: 106 MMOL/L (ref 99–110)
CHOLESTEROL, TOTAL: 210 MG/DL (ref 0–199)
CO2: 22 MMOL/L (ref 21–32)
CREAT SERPL-MCNC: 0.6 MG/DL (ref 0.6–1.1)
EOSINOPHILS ABSOLUTE: 0.1 K/UL (ref 0–0.6)
EOSINOPHILS RELATIVE PERCENT: 1.2 %
GFR AFRICAN AMERICAN: >60
GFR NON-AFRICAN AMERICAN: >60
GLOBULIN: 2.4 G/DL
GLUCOSE BLD-MCNC: 105 MG/DL (ref 70–99)
HCT VFR BLD CALC: 42.4 % (ref 36–48)
HDLC SERPL-MCNC: 47 MG/DL (ref 40–60)
HEMOGLOBIN: 14.1 G/DL (ref 12–16)
LDL CHOLESTEROL CALCULATED: 135 MG/DL
LYMPHOCYTES ABSOLUTE: 2.2 K/UL (ref 1–5.1)
LYMPHOCYTES RELATIVE PERCENT: 36.8 %
MCH RBC QN AUTO: 30.6 PG (ref 26–34)
MCHC RBC AUTO-ENTMCNC: 33.2 G/DL (ref 31–36)
MCV RBC AUTO: 92.1 FL (ref 80–100)
MONOCYTES ABSOLUTE: 0.5 K/UL (ref 0–1.3)
MONOCYTES RELATIVE PERCENT: 8.7 %
NEUTROPHILS ABSOLUTE: 3.2 K/UL (ref 1.7–7.7)
NEUTROPHILS RELATIVE PERCENT: 52.9 %
PDW BLD-RTO: 12.8 % (ref 12.4–15.4)
PLATELET # BLD: 191 K/UL (ref 135–450)
PMV BLD AUTO: 10.9 FL (ref 5–10.5)
POTASSIUM SERPL-SCNC: 4.4 MMOL/L (ref 3.5–5.1)
RBC # BLD: 4.6 M/UL (ref 4–5.2)
SODIUM BLD-SCNC: 141 MMOL/L (ref 136–145)
TOTAL PROTEIN: 6.9 G/DL (ref 6.4–8.2)
TRIGL SERPL-MCNC: 139 MG/DL (ref 0–150)
VLDLC SERPL CALC-MCNC: 28 MG/DL
WBC # BLD: 6.1 K/UL (ref 4–11)

## 2020-11-24 PROCEDURE — 3017F COLORECTAL CA SCREEN DOC REV: CPT | Performed by: INTERNAL MEDICINE

## 2020-11-24 PROCEDURE — 99214 OFFICE O/P EST MOD 30 MIN: CPT | Performed by: INTERNAL MEDICINE

## 2020-11-24 PROCEDURE — G8417 CALC BMI ABV UP PARAM F/U: HCPCS | Performed by: INTERNAL MEDICINE

## 2020-11-24 PROCEDURE — G8484 FLU IMMUNIZE NO ADMIN: HCPCS | Performed by: INTERNAL MEDICINE

## 2020-11-24 PROCEDURE — 36415 COLL VENOUS BLD VENIPUNCTURE: CPT | Performed by: INTERNAL MEDICINE

## 2020-11-24 PROCEDURE — 1036F TOBACCO NON-USER: CPT | Performed by: INTERNAL MEDICINE

## 2020-11-24 PROCEDURE — G8427 DOCREV CUR MEDS BY ELIG CLIN: HCPCS | Performed by: INTERNAL MEDICINE

## 2020-11-24 NOTE — PROGRESS NOTES
[address] [place] [address] ) [phone] ()      Dear Dr. Blanca Rodríguez MD:  Please find Rheumatology assessment. Thank you for giving me the opportunity to be involved in [facility] care and I look forward following Palmira Lock along with you. If you have any questions or concerns please feel free to reach me. Note is transcribed using voice recognition software. Inadvertent computerized transcription errors may be present. Patient identification: Palmira Mata,: 1960,60 y.o. Sex: female     A/P  Palmira Lock was seen today for joint swelling. Diagnoses and all orders for this visit:    Rheumatoid arthritis involving multiple sites with positive rheumatoid factor (HCC)    High risk medication use  -     Comprehensive Metabolic Panel  -     CBC Auto Differential  -     Sedimentation Rate  -     C-REACTIVE PROTEIN        Rheumatoid arthritis-RF 26, negative QLA-corxgrblxkw-ahypwuld disease persistent across PIPs MCPs and possibly ankles. Has elevated sed rate and CRP as well. Plan-  We will check labs today to monitor medications. Recommend increasing sulfasalazine 1 g twice daily. She is currently taking 1 g daily. Rash with Plaquenil. Reassessment in 3 months. Patient indicates understanding and agrees with the management plan. I reviewed patient's history, referral documents and electronic medical records. Copy of consult note is being routed electronically/faxed to referring physician. #######################################################################      Yyeoaqywmm-cimlcp-ojklysftrvio rheumatoid arthritis and generalized osteoarthritis.   Interval Dewayne Layne continues to have persistent discomfort across MCPs and PIPs especially right hand with stiffness, puffiness, symptoms are worse in the morning with stiffness lasting about half an hour, however is able to carry out ADLs and recreational activities normally. Lately, both ankles feel stiff and look puffy without much pain. No overt flares of arthritis. Sed rate and CRP have been elevated mildly. She is only taking sulfasalazine 1 g once a day. Is tolerating medications well. No rashes, GI side effects, infections. All other review of systems are negative. Past Medical History:   Diagnosis Date    Allergic rhinitis, cause unspecified     chronic    Anxiety state, unspecified     Blepharophimosis     Contact dermatitis and other eczema, due to unspecified cause     Depressive disorder, not elsewhere classified     Headache(784.0)     Insomnia, unspecified     Obesity, unspecified     Other and unspecified hyperlipidemia     Other specified congenital anomaly of skin     Pain in both feet 12/17/2015    S/p fracture 2015     Screening colonoscopy 6/2014    normal-repeat 10 yrs     Unspecified sinusitis (chronic)      Past Surgical History:   Procedure Laterality Date    COLONOSCOPY  7/21/2014    repeat 10 yrs.  HYSTERECTOMY  2000    for prolapse partial    LIPOMA RESECTION  2007    left thigh     Social History     Socioeconomic History    Marital status:      Spouse name: Not on file    Number of children: Not on file    Years of education: Not on file    Highest education level: Not on file   Occupational History    Not on file   Social Needs    Financial resource strain: Not on file    Food insecurity     Worry: Not on file     Inability: Not on file    Transportation needs     Medical: Not on file     Non-medical: Not on file   Tobacco Use    Smoking status: Never Smoker    Smokeless tobacco: Never Used   Substance and Sexual Activity    Alcohol use:  Yes     Alcohol/week: 0.0 standard drinks     Comment: occasionally    Drug use: No    Sexual activity: Not Currently   Lifestyle    Physical activity     Days per week: Not on file     Minutes per session: Not on file    Stress: Not on file   Relationships    Social connections     Talks on phone: Not on file     Gets together: Not on file     Attends Shinto service: Not on file     Active member of club or organization: Not on file     Attends meetings of clubs or organizations: Not on file     Relationship status: Not on file    Intimate partner violence     Fear of current or ex partner: Not on file     Emotionally abused: Not on file     Physically abused: Not on file     Forced sexual activity: Not on file   Other Topics Concern    Not on file   Social History Narrative    Not on file       No family history of autoimmune diseases    Current Outpatient Medications   Medication Sig Dispense Refill    sertraline (ZOLOFT) 50 MG tablet TAKE 1 AND 1/2 TABLETS BY MOUTH DAILY 45 tablet 11    sulfaSALAzine (AZULFIDINE) 500 MG tablet TAKE 2 TABLETS BY MOUTH  TWICE DAILY 360 tablet 0    fluticasone (FLONASE) 50 MCG/ACT nasal spray 2 sprays by Each Nostril route daily 3 Bottle 1    sulfaSALAzine (AZULFIDINE) 500 MG tablet Take 2 tab po daily 180 tablet 1    beclomethasone (QNASL) 80 MCG/ACT AERS nasal spray USE 2 SPRAYS IN EACH NOSTRIL EVERY DAY 8.7 g 12    butalbital-acetaminophen-caffeine (FIORICET, ESGIC) -40 MG per tablet TAKE 1 TABLET BY MOUTH EVERY 6 HOURS AS NEEDED FOR HEADACHE 30 tablet 3    promethazine-codeine (PHENERGAN WITH CODEINE) 6.25-10 MG/5ML syrup Take 5 mLs by mouth every 4 hours as needed for Cough 180 mL 1    ketoconazole (NIZORAL) 2 % cream APPLY TWICE DAILY 60 g 12    ammonium lactate (AMLACTIN) 12 % cream APPLY TOPICALLY AS NEEDED 280 g 0    oxymetazoline (AFRIN) 0.05 % nasal spray 2 sprays by Nasal route 2 times daily.  malathion (OVIDE) 0.5 % lotion Apply topically once.  1 Bottle 3    VITD25 35.2 07/22/2020       A/P- See above.

## 2020-11-25 LAB
ESTIMATED AVERAGE GLUCOSE: 105.4 MG/DL
HBA1C MFR BLD: 5.3 %
SEDIMENTATION RATE, ERYTHROCYTE: 23 MM/HR (ref 0–30)

## 2020-11-30 ENCOUNTER — TELEPHONE (OUTPATIENT)
Dept: RHEUMATOLOGY | Age: 60
End: 2020-11-30

## 2020-12-24 LAB
ALBUMIN SERPL-MCNC: 4.7 G/DL (ref 3.4–5)
ALP BLD-CCNC: 101 U/L (ref 40–129)
ALT SERPL-CCNC: 42 U/L (ref 10–40)
AST SERPL-CCNC: 25 U/L (ref 15–37)
BILIRUB SERPL-MCNC: 0.6 MG/DL (ref 0–1)
BILIRUBIN DIRECT: <0.2 MG/DL (ref 0–0.3)
BILIRUBIN, INDIRECT: ABNORMAL MG/DL (ref 0–1)
TOTAL PROTEIN: 7.4 G/DL (ref 6.4–8.2)

## 2020-12-24 PROCEDURE — 36415 COLL VENOUS BLD VENIPUNCTURE: CPT | Performed by: INTERNAL MEDICINE

## 2021-01-13 DIAGNOSIS — F41.9 ANXIETY: ICD-10-CM

## 2021-01-14 RX ORDER — LORAZEPAM 1 MG/1
TABLET ORAL
Qty: 30 TABLET | OUTPATIENT
Start: 2021-01-14

## 2021-01-21 DIAGNOSIS — F41.9 ANXIETY: ICD-10-CM

## 2021-01-21 RX ORDER — LORAZEPAM 1 MG/1
TABLET ORAL
Qty: 30 TABLET | Refills: 0 | Status: SHIPPED | OUTPATIENT
Start: 2021-01-21 | End: 2021-10-20 | Stop reason: SDUPTHER

## 2021-02-05 RX ORDER — FLUTICASONE PROPIONATE 50 MCG
2 SPRAY, SUSPENSION (ML) NASAL DAILY
Qty: 3 BOTTLE | Refills: 0 | Status: SHIPPED | OUTPATIENT
Start: 2021-02-05 | End: 2021-02-18 | Stop reason: SDUPTHER

## 2021-02-18 ENCOUNTER — VIRTUAL VISIT (OUTPATIENT)
Dept: RHEUMATOLOGY | Age: 61
End: 2021-02-18
Payer: COMMERCIAL

## 2021-02-18 DIAGNOSIS — M05.79 RHEUMATOID ARTHRITIS INVOLVING MULTIPLE SITES WITH POSITIVE RHEUMATOID FACTOR (HCC): Primary | ICD-10-CM

## 2021-02-18 DIAGNOSIS — Z79.899 HIGH RISK MEDICATION USE: ICD-10-CM

## 2021-02-18 PROCEDURE — 99214 OFFICE O/P EST MOD 30 MIN: CPT | Performed by: INTERNAL MEDICINE

## 2021-02-18 PROCEDURE — G8427 DOCREV CUR MEDS BY ELIG CLIN: HCPCS | Performed by: INTERNAL MEDICINE

## 2021-02-18 PROCEDURE — 3017F COLORECTAL CA SCREEN DOC REV: CPT | Performed by: INTERNAL MEDICINE

## 2021-02-18 RX ORDER — FLUTICASONE PROPIONATE 50 MCG
SPRAY, SUSPENSION (ML) NASAL
Qty: 3 BOTTLE | Refills: 3 | Status: SHIPPED | OUTPATIENT
Start: 2021-02-18 | End: 2022-03-16

## 2021-02-18 RX ORDER — SULFASALAZINE 500 MG/1
TABLET ORAL
Qty: 360 TABLET | Refills: 0 | Status: SHIPPED | OUTPATIENT
Start: 2021-02-18 | End: 2021-12-10

## 2021-02-18 NOTE — PROGRESS NOTES
Vadim French is a 64 y.o. female being evaluated by a Virtual Visit (video visit) encounter to address concerns as mentioned above. A caregiver was present when appropriate. Due to this being a TeleHealth encounter (During HVQYV-64 public health emergency), evaluation of the following organ systems was limited: Vitals/Constitutional/EENT/Resp/CV/GI//MS/Neuro/Skin/Heme-Lymph-Imm. Pursuant to the emergency declaration under the [address] [place] [address], [address] [place] [address] authority and the [employer] and [employer] Act, this Virtual Visit was conducted with patient's (and/or legal guardian's) consent, to reduce the patient's risk of exposure to COVID-19 and provide necessary medical care. The patient (and/or legal guardian) has also been advised to contact this office for worsening conditions or problems, and seek emergency medical treatment and/or call 911 if deemed necessary. Patient identification was verified at the start of the visit: Yes    Total time spent for this encounter: Not billed by time    Services were provided through a video synchronous discussion virtually to substitute for in-person clinic visit. Patient and provider were located at their individual homes. --Tamara Mensah MD on 2/18/2021 at 12:19 PM    An electronic signature was used to authenticate this note. [address] [place] [address] ) [phone] (F)      Dear Dr. Fadi Coates MD:  Please find Rheumatology assessment. Thank you for giving me the opportunity to be involved in [facility] care and I look forward following Toby Majano along with you.  If you have any (AZULFIDINE) 500 MG tablet Take  2 tab po  tablet 0    fluticasone (FLONASE) 50 MCG/ACT nasal spray 2 sprays by Each Nostril route daily Appointment needed when refill is finished 3 Bottle 0    LORazepam (ATIVAN) 1 MG tablet TAKE 1 TABLET BY MOUTH EVERY 6 HOURS 30 tablet 0    sulfaSALAzine (AZULFIDINE) 500 MG tablet TAKE 2 TABLETS BY MOUTH  TWICE DAILY 360 tablet 0    sertraline (ZOLOFT) 50 MG tablet TAKE 1 AND 1/2 TABLETS BY MOUTH DAILY 45 tablet 11    sulfaSALAzine (AZULFIDINE) 500 MG tablet Take 2 tab po daily 180 tablet 1    beclomethasone (QNASL) 80 MCG/ACT AERS nasal spray USE 2 SPRAYS IN EACH NOSTRIL EVERY DAY 8.7 g 12    butalbital-acetaminophen-caffeine (FIORICET, ESGIC) -40 MG per tablet TAKE 1 TABLET BY MOUTH EVERY 6 HOURS AS NEEDED FOR HEADACHE 30 tablet 3    promethazine-codeine (PHENERGAN WITH CODEINE) 6.25-10 MG/5ML syrup Take 5 mLs by mouth every 4 hours as needed for Cough 180 mL 1    ketoconazole (NIZORAL) 2 % cream APPLY TWICE DAILY 60 g 12    ammonium lactate (AMLACTIN) 12 % cream APPLY TOPICALLY AS NEEDED 280 g 0    oxymetazoline (AFRIN) 0.05 % nasal spray 2 sprays by Nasal route 2 times daily.  malathion (OVIDE) 0.5 % lotion Apply topically once. 1 Bottle 3    mometasone (ELOCON) 0.1 % cream Apply  topically daily. No current facility-administered medications for this visit. Allergies   Allergen Reactions    Plaquenil [Hydroxychloroquine Sulfate] Rash       PHYSICAL EXAM:    Vitals: There were no vitals taken for this visit. General appearance/ Psychiatric: well nourished, and well groomed, normal judgement, alert, appears stated age and cooperative.    MKS: 28 joint JOINT COUNT:                               Right                                                  Left   Swell Tender Swell Tender   PIP1           PIP2        PIP3       PIP4          PIP5          MCP1           MCP2       MCP3       MCP4           MCP5           Wrist          Elbow Shoulder          Knee             No vs findings in [address] . Full fist, FROM in her wrists. Skin: No rashes    DATA:   Lab Results   Component Value Date    WBC 6.1 11/24/2020    HGB 14.1 11/24/2020    HCT 42.4 11/24/2020    MCV 92.1 11/24/2020     11/24/2020         Chemistry        Component Value Date/Time     11/24/2020 1121    K 4.4 11/24/2020 1121     11/24/2020 1121    CO2 22 11/24/2020 1121    BUN 17 11/24/2020 1121    CREATININE 0.6 11/24/2020 1121        Component Value Date/Time    CALCIUM 9.5 11/24/2020 1121    ALKPHOS 101 12/24/2020 1127    AST 25 12/24/2020 1127    ALT 42 (H) 12/24/2020 1127    BILITOT 0.6 12/24/2020 1127           Lab Results   Component Value Date    SEDRATE 23 11/24/2020     No results found for: CKTOTAL  Lab Results   Component Value Date    TSH 2.59 01/27/2020     Lab Results   Component Value Date    VITD25 35.2 07/22/2020       A/P- See above.

## 2021-02-25 ENCOUNTER — VIRTUAL VISIT (OUTPATIENT)
Dept: INTERNAL MEDICINE CLINIC | Age: 61
End: 2021-02-25
Payer: COMMERCIAL

## 2021-02-25 DIAGNOSIS — E78.00 PURE HYPERCHOLESTEROLEMIA: ICD-10-CM

## 2021-02-25 DIAGNOSIS — M05.79 RHEUMATOID ARTHRITIS INVOLVING MULTIPLE SITES WITH POSITIVE RHEUMATOID FACTOR (HCC): ICD-10-CM

## 2021-02-25 DIAGNOSIS — K63.5 HYPERPLASTIC POLYP OF ASCENDING COLON: ICD-10-CM

## 2021-02-25 PROCEDURE — 99396 PREV VISIT EST AGE 40-64: CPT | Performed by: INTERNAL MEDICINE

## 2021-02-25 ASSESSMENT — ENCOUNTER SYMPTOMS
COLOR CHANGE: 0
WHEEZING: 0
BACK PAIN: 0
CHEST TIGHTNESS: 0
ABDOMINAL PAIN: 0

## 2021-02-25 ASSESSMENT — PATIENT HEALTH QUESTIONNAIRE - PHQ9
SUM OF ALL RESPONSES TO PHQ9 QUESTIONS 1 & 2: 0
SUM OF ALL RESPONSES TO PHQ QUESTIONS 1-9: 0
2. FEELING DOWN, DEPRESSED OR HOPELESS: 0

## 2021-02-25 NOTE — PROGRESS NOTES
Nataly Heath (:  1960) is a 64 y.o. female,Established patient, here for evaluation of the following chief complaint(s): Annual Exam (yearly)      ASSESSMENT/PLAN:  1. Hyperplastic polyp of ascending colon  Assessment & Plan:  2014 diminutive hyperplastic polyp  Repeat 10 yr or cologuard  2. Rheumatoid arthritis involving multiple sites with positive rheumatoid factor ([facility] [address]  Assessment & Plan:  Cont f/u w   3. Pure hypercholesterolemia  Assessment & Plan:  Cont to follow       Return in about 1 year (around 2022) for physical.    SUBJECTIVE/OBJECTIVE:  Feels great- has lost almost 50#'s- still working w Dr. Cosme Villegas and has unfortunately has had some reactions but doing well on current med- cholesterol still sl up but not severe- glyco was totally normal -feels mentally more stable      Review of Systems   Constitutional: Negative for activity change, appetite change and fatigue. HENT: Negative for congestion. Eyes: Negative for visual disturbance. Respiratory: Negative for chest tightness and wheezing. Cardiovascular: Negative for chest pain and palpitations. Gastrointestinal: Negative for abdominal pain. Musculoskeletal: Negative for arthralgias and back pain. Skin: Negative for color change and rash. Neurological: Negative for weakness, light-headedness and headaches. Psychiatric/Behavioral: Negative for dysphoric mood and sleep disturbance. The patient is not nervous/anxious. No flowsheet data found.      Physical Exam    [INSTRUCTIONS:  \"[x]\" Indicates a positive item  \"[]\" Indicates a negative item  -- DELETE ALL ITEMS NOT EXAMINED]    Constitutional: [x] Appears well-developed and well-nourished [x] No apparent distress      [] Abnormal -     Mental status: [x] Alert and awake  [x] Oriented to person/place/time [x] Able to follow commands    [] Abnormal -     Eyes:   EOM    [x]  Normal    [] Abnormal -   Sclera  [x]  Normal    [] Abnormal -          Discharge [x]  None visible   [] Abnormal -     HENT: [x] Normocephalic, atraumatic  [] Abnormal -   [x] Mouth/Throat: Mucous membranes are moist    External Ears [x] Normal  [] Abnormal -    Neck: [x] No visualized mass [] Abnormal -     Pulmonary/Chest: [x] Respiratory effort normal   [x] No visualized signs of difficulty breathing or respiratory distress        [] Abnormal -      Musculoskeletal:   [x] Normal gait with no signs of ataxia         [x] Normal range of motion of neck        [] Abnormal -     Neurological:        [x] No Facial Asymmetry (Cranial nerve 7 motor function) (limited exam due to video visit)          [x] No gaze palsy        [] Abnormal -          Skin:        [x] No significant exanthematous lesions or discoloration noted on facial skin         [] Abnormal -            Psychiatric:       [x] Normal Affect [] Abnormal -        [x] No Hallucinations    Other pertinent observable physical exam findings:-              Cari Brock is a 64 y.o. female being evaluated by a Virtual Visit (video visit) encounter to address concerns as mentioned above. A caregiver was present when appropriate. Due to this being a TeleHealth encounter (During [facility]- public health emergency), evaluation of the following organ systems was limited: Vitals/Constitutional/EENT/Resp/CV/GI//MS/Neuro/Skin/Heme-Lymph-Imm. Pursuant to the emergency declaration under the [address] [place] [address] authority and the [employer] and [employer] Act, this Virtual Visit was conducted with patient's (and/or legal guardian's) consent, to reduce the patient's risk of exposure to COVID-19 and provide necessary medical care. The patient (and/or legal guardian) has also been advised to contact this office for worsening conditions or problems, and seek emergency medical treatment and/or call 911 if deemed necessary.      Patient identification was verified at the start of the visit: Yes    Services were provided through a video synchronous discussion virtually to substitute for in-person clinic visit. Patient was located at home and provider was located in office or at home. An electronic signature was used to authenticate this note.     --Pipo Osborne MD

## 2021-02-28 ENCOUNTER — PATIENT MESSAGE (OUTPATIENT)
Dept: INTERNAL MEDICINE CLINIC | Age: 61
End: 2021-02-28

## 2021-03-02 RX ORDER — FLUCONAZOLE 150 MG/1
150 TABLET ORAL ONCE
Qty: 2 TABLET | Refills: 3 | Status: SHIPPED | OUTPATIENT
Start: 2021-03-02 | End: 2021-03-02

## 2021-03-02 NOTE — TELEPHONE ENCOUNTER
From: Amy Rogers  To: Elkin Burrows MD  Sent: 2/28/2021 10:24 AM EST  Subject: Non-Urgent Medical Question    Hi-    I forgot to mention that I think I have a yeast infection. Thanks.    KL

## 2021-03-05 DIAGNOSIS — Z79.899 HIGH RISK MEDICATION USE: ICD-10-CM

## 2021-03-05 LAB
BASOPHILS ABSOLUTE: 0 K/UL (ref 0–0.2)
BASOPHILS RELATIVE PERCENT: 0.3 %
EOSINOPHILS ABSOLUTE: 0.1 K/UL (ref 0–0.6)
EOSINOPHILS RELATIVE PERCENT: 1.4 %
HCT VFR BLD CALC: 42.6 % (ref 36–48)
HEMOGLOBIN: 14.3 G/DL (ref 12–16)
LYMPHOCYTES ABSOLUTE: 2.2 K/UL (ref 1–5.1)
LYMPHOCYTES RELATIVE PERCENT: 38.1 %
MCH RBC QN AUTO: 31.3 PG (ref 26–34)
MCHC RBC AUTO-ENTMCNC: 33.6 G/DL (ref 31–36)
MCV RBC AUTO: 93.2 FL (ref 80–100)
MONOCYTES ABSOLUTE: 0.5 K/UL (ref 0–1.3)
MONOCYTES RELATIVE PERCENT: 8.4 %
NEUTROPHILS ABSOLUTE: 3 K/UL (ref 1.7–7.7)
NEUTROPHILS RELATIVE PERCENT: 51.8 %
PDW BLD-RTO: 13 % (ref 12.4–15.4)
PLATELET # BLD: 201 K/UL (ref 135–450)
PMV BLD AUTO: 10.4 FL (ref 5–10.5)
RBC # BLD: 4.58 M/UL (ref 4–5.2)
SEDIMENTATION RATE, ERYTHROCYTE: 21 MM/HR (ref 0–30)
WBC # BLD: 5.8 K/UL (ref 4–11)

## 2021-03-05 PROCEDURE — 36415 COLL VENOUS BLD VENIPUNCTURE: CPT | Performed by: INTERNAL MEDICINE

## 2021-03-06 LAB
ALBUMIN SERPL-MCNC: 4.3 G/DL (ref 3.4–5)
ALP BLD-CCNC: 90 U/L (ref 40–129)
ALT SERPL-CCNC: 35 U/L (ref 10–40)
AST SERPL-CCNC: 22 U/L (ref 15–37)
BILIRUB SERPL-MCNC: <0.2 MG/DL (ref 0–1)
BILIRUBIN DIRECT: <0.2 MG/DL (ref 0–0.3)
BILIRUBIN, INDIRECT: NORMAL MG/DL (ref 0–1)
C-REACTIVE PROTEIN: 4.8 MG/L (ref 0–5.1)
CREAT SERPL-MCNC: 0.6 MG/DL (ref 0.6–1.1)
GFR AFRICAN AMERICAN: >60
GFR NON-AFRICAN AMERICAN: >60
TOTAL PROTEIN: 7.1 G/DL (ref 6.4–8.2)

## 2021-05-19 ENCOUNTER — VIRTUAL VISIT (OUTPATIENT)
Dept: RHEUMATOLOGY | Age: 61
End: 2021-05-19

## 2021-05-19 DIAGNOSIS — Z79.899 HIGH RISK MEDICATION USE: ICD-10-CM

## 2021-05-19 DIAGNOSIS — M05.79 RHEUMATOID ARTHRITIS INVOLVING MULTIPLE SITES WITH POSITIVE RHEUMATOID FACTOR (HCC): Primary | ICD-10-CM

## 2021-05-19 PROCEDURE — G8427 DOCREV CUR MEDS BY ELIG CLIN: HCPCS | Performed by: INTERNAL MEDICINE

## 2021-05-19 PROCEDURE — 3017F COLORECTAL CA SCREEN DOC REV: CPT | Performed by: INTERNAL MEDICINE

## 2021-05-19 PROCEDURE — 99214 OFFICE O/P EST MOD 30 MIN: CPT | Performed by: INTERNAL MEDICINE

## 2021-05-19 RX ORDER — SULFASALAZINE 500 MG/1
TABLET ORAL
Qty: 360 TABLET | Refills: 0 | Status: SHIPPED | OUTPATIENT
Start: 2021-05-19 | End: 2021-12-10

## 2021-05-19 NOTE — PROGRESS NOTES
Robbi Huerta is a 63 y.o. female being evaluated by a Virtual Visit (video visit) encounter to address concerns as mentioned above. A caregiver was present when appropriate. Due to this being a TeleHealth encounter (During MPYSJ-38 public health emergency), evaluation of the following organ systems was limited: Vitals/Constitutional/EENT/Resp/CV/GI//MS/Neuro/Skin/Heme-Lymph-Imm. Pursuant to the emergency declaration under the [address] [place] [address] authority and the [employer] and [employer] Act, this Virtual Visit was conducted with patient's (and/or legal guardian's) consent, to reduce the patient's risk of exposure to COVID-19 and provide necessary medical care. The patient (and/or legal guardian) has also been advised to contact this office for worsening conditions or problems, and seek emergency medical treatment and/or call 911 if deemed necessary. Patient identification was verified at the start of the visit: Yes      Services were provided through a video synchronous discussion virtually to substitute for in-person clinic visit. Patient and provider were located at their individual homes. --Rosemary Dawson MD on 5/19/2021 at 3:08 PM    An electronic signature was used to authenticate this note. [address] [place] [address], [address] [place] [address] (E) [phone] (F)      Dear Dr. Klaudia Zhao MD:  Please find Rheumatology assessment. Thank you for giving me the opportunity to be involved in [facility] care and I look forward following Suresh Arizmendi along with you. If you have any questions or concerns please feel free to reach me.     Note is transcribed using voice recognition software. Inadvertent computerized transcription errors may be present. Patient identification: Elli Mata,: 1960,61 y.o. Sex: female          Elli Canales was seen today for follow-up. Diagnoses and all orders for this visit:    Rheumatoid arthritis involving multiple sites with positive rheumatoid factor (HCC)    High risk medication use  -     AST(SGOT) & ALT(SGPT); Standing  -     CBC Auto Differential; Standing  -     C-Reactive Protein; Standing  -     Creatinine, Serum; Standing  -     Sedimentation Rate; Standing    Other orders  -     sulfaSALAzine (AZULFIDINE) 500 MG tablet; Take  2 tab po BID      Rheumatoid arthritis-RF 26, negative CCP-doing well on sulfasalazine 1 g twice . No painful joints, has some puffiness in one of her ankle. Asymptomatic otherwise. Tolerating medications well. Is due for safety lab, orders are placed in system. Prescription renewed. Call us with any flares of symptoms, reassessment in 3 months. Patient indicates understanding and agrees with the management plan. I reviewed patient's history, referral documents and electronic medical records. #######################################################################      Pncedmiqdf-bibiki-mfpvxzqdmtco rheumatoid arthritis and generalized osteoarthritis. Interval changes-she feels much better on current dose of sulfasalazine, denies any pain, stiffness in her joints. States that one of her ankles still look slightly puffy but does not hurt. Normal ADLs and decrease in activities. No adverse events from medications. Safety labs have been normal.  All other review of systems are negative. Past medical, surgical history reviewed.     Current Outpatient Medications   Medication Sig Dispense Refill    sulfaSALAzine (AZULFIDINE) 500 MG tablet Take  2 tab po  tablet 0    sertraline (ZOLOFT) 50 MG tablet TAKE 1 AND 1/2 TABLETS BY MOUTH DAILY 45 tablet 3    sulfaSALAzine (AZULFIDINE) 500 MG tablet Take  2 tab po  tablet 0    fluticasone (FLONASE) 50 MCG/ACT nasal spray Appointment needed when refill is finished(90 DAY SUPPLY X 3 REFILLS) 3 Bottle 3     No current facility-administered medications for this visit. Allergies   Allergen Reactions    Plaquenil [Hydroxychloroquine Sulfate] Rash       PHYSICAL EXAM:    Vitals: There were no vitals taken for this visit. General appearance/ Psychiatric: well nourished, and well groomed, normal judgement, alert, appears stated age and cooperative. MKS: 28 joint JOINT COUNT:                               Right                                                  Left   Swell Tender Swell Tender   PIP1           PIP2        PIP3       PIP4          PIP5          MCP1           MCP2       MCP3       MCP4           MCP5           Wrist          Elbow           Shoulder          Knee             No findings to be seen in VS.  No objective musculoskeletal concerns. No rashes. Appears comfortable with normal breathing effort. DATA:   Lab Results   Component Value Date    WBC 5.8 03/05/2021    HGB 14.3 03/05/2021    HCT 42.6 03/05/2021    MCV 93.2 03/05/2021     03/05/2021         Chemistry        Component Value Date/Time     11/24/2020 1121    K 4.4 11/24/2020 1121     11/24/2020 1121    CO2 22 11/24/2020 1121    BUN 17 11/24/2020 1121    CREATININE 0.6 03/05/2021 1131        Component Value Date/Time    CALCIUM 9.5 11/24/2020 1121    ALKPHOS 90 03/05/2021 1131    AST 22 03/05/2021 1131    ALT 35 03/05/2021 1131    BILITOT <0.2 03/05/2021 1131           Lab Results   Component Value Date    SEDRATE 21 03/05/2021     No results found for: CKTOTAL  Lab Results   Component Value Date    TSH 2.59 01/27/2020     Lab Results   Component Value Date    VITD25 35.2 07/22/2020       A/P- See above.

## 2021-06-07 DIAGNOSIS — Z79.899 HIGH RISK MEDICATION USE: ICD-10-CM

## 2021-06-08 LAB
ALT SERPL-CCNC: 26 U/L (ref 10–40)
AST SERPL-CCNC: 19 U/L (ref 15–37)
BASOPHILS ABSOLUTE: 0 K/UL (ref 0–0.2)
BASOPHILS RELATIVE PERCENT: 0.2 %
C-REACTIVE PROTEIN: 9.6 MG/L (ref 0–5.1)
CREAT SERPL-MCNC: 0.8 MG/DL (ref 0.6–1.1)
EOSINOPHILS ABSOLUTE: 0.1 K/UL (ref 0–0.6)
EOSINOPHILS RELATIVE PERCENT: 1.1 %
GFR AFRICAN AMERICAN: >60
GFR NON-AFRICAN AMERICAN: >60
HCT VFR BLD CALC: 37.8 % (ref 36–48)
HEMOGLOBIN: 12.8 G/DL (ref 12–16)
LYMPHOCYTES ABSOLUTE: 2.2 K/UL (ref 1–5.1)
LYMPHOCYTES RELATIVE PERCENT: 32.1 %
MCH RBC QN AUTO: 31.5 PG (ref 26–34)
MCHC RBC AUTO-ENTMCNC: 33.8 G/DL (ref 31–36)
MCV RBC AUTO: 93.1 FL (ref 80–100)
MONOCYTES ABSOLUTE: 0.6 K/UL (ref 0–1.3)
MONOCYTES RELATIVE PERCENT: 9.2 %
NEUTROPHILS ABSOLUTE: 4 K/UL (ref 1.7–7.7)
NEUTROPHILS RELATIVE PERCENT: 57.4 %
PDW BLD-RTO: 13.1 % (ref 12.4–15.4)
PLATELET # BLD: 231 K/UL (ref 135–450)
PMV BLD AUTO: 10.3 FL (ref 5–10.5)
RBC # BLD: 4.06 M/UL (ref 4–5.2)
SEDIMENTATION RATE, ERYTHROCYTE: 22 MM/HR (ref 0–30)
WBC # BLD: 6.9 K/UL (ref 4–11)

## 2021-08-23 ENCOUNTER — OFFICE VISIT (OUTPATIENT)
Dept: RHEUMATOLOGY | Age: 61
End: 2021-08-23
Payer: COMMERCIAL

## 2021-08-23 VITALS
BODY MASS INDEX: 37.9 KG/M2 | HEIGHT: 64 IN | DIASTOLIC BLOOD PRESSURE: 76 MMHG | SYSTOLIC BLOOD PRESSURE: 122 MMHG | WEIGHT: 222 LBS

## 2021-08-23 DIAGNOSIS — Z79.899 HIGH RISK MEDICATION USE: ICD-10-CM

## 2021-08-23 DIAGNOSIS — M05.79 RHEUMATOID ARTHRITIS INVOLVING MULTIPLE SITES WITH POSITIVE RHEUMATOID FACTOR (HCC): Primary | ICD-10-CM

## 2021-08-23 LAB
ALT SERPL-CCNC: 40 U/L (ref 10–40)
AST SERPL-CCNC: 26 U/L (ref 15–37)
BASOPHILS ABSOLUTE: 0 K/UL (ref 0–0.2)
BASOPHILS RELATIVE PERCENT: 0.5 %
C-REACTIVE PROTEIN: 7.6 MG/L (ref 0–5.1)
CREAT SERPL-MCNC: 0.6 MG/DL (ref 0.6–1.1)
EOSINOPHILS ABSOLUTE: 0.1 K/UL (ref 0–0.6)
EOSINOPHILS RELATIVE PERCENT: 1.2 %
GFR AFRICAN AMERICAN: >60
GFR NON-AFRICAN AMERICAN: >60
HCT VFR BLD CALC: 39.3 % (ref 36–48)
HEMOGLOBIN: 13.4 G/DL (ref 12–16)
LYMPHOCYTES ABSOLUTE: 2.5 K/UL (ref 1–5.1)
LYMPHOCYTES RELATIVE PERCENT: 37.6 %
MCH RBC QN AUTO: 31 PG (ref 26–34)
MCHC RBC AUTO-ENTMCNC: 34.1 G/DL (ref 31–36)
MCV RBC AUTO: 90.7 FL (ref 80–100)
MONOCYTES ABSOLUTE: 0.6 K/UL (ref 0–1.3)
MONOCYTES RELATIVE PERCENT: 9.2 %
NEUTROPHILS ABSOLUTE: 3.4 K/UL (ref 1.7–7.7)
NEUTROPHILS RELATIVE PERCENT: 51.5 %
PDW BLD-RTO: 12.8 % (ref 12.4–15.4)
PLATELET # BLD: 190 K/UL (ref 135–450)
PMV BLD AUTO: 10.7 FL (ref 5–10.5)
RBC # BLD: 4.33 M/UL (ref 4–5.2)
SEDIMENTATION RATE, ERYTHROCYTE: 18 MM/HR (ref 0–30)
WBC # BLD: 6.6 K/UL (ref 4–11)

## 2021-08-23 PROCEDURE — 3017F COLORECTAL CA SCREEN DOC REV: CPT | Performed by: INTERNAL MEDICINE

## 2021-08-23 PROCEDURE — G8417 CALC BMI ABV UP PARAM F/U: HCPCS | Performed by: INTERNAL MEDICINE

## 2021-08-23 PROCEDURE — 99214 OFFICE O/P EST MOD 30 MIN: CPT | Performed by: INTERNAL MEDICINE

## 2021-08-23 PROCEDURE — 1036F TOBACCO NON-USER: CPT | Performed by: INTERNAL MEDICINE

## 2021-08-23 PROCEDURE — 36415 COLL VENOUS BLD VENIPUNCTURE: CPT | Performed by: INTERNAL MEDICINE

## 2021-08-23 PROCEDURE — G8427 DOCREV CUR MEDS BY ELIG CLIN: HCPCS | Performed by: INTERNAL MEDICINE

## 2021-08-23 RX ORDER — SULFASALAZINE 500 MG/1
TABLET ORAL
Qty: 360 TABLET | Refills: 0 | Status: SHIPPED | OUTPATIENT
Start: 2021-08-23 | End: 2021-12-10

## 2021-08-23 NOTE — PROGRESS NOTES
[address] [place] [address] ) [phone] (F)      Dear Dr. Aisha Chaudhari MD:  Please find Rheumatology assessment. Thank you for giving me the opportunity to be involved in [facility] care and I look forward following Ingrid Milian along with you. If you have any questions or concerns please feel free to reach me. Note is transcribed using voice recognition software. Inadvertent computerized transcription errors may be present. Patient identification: Ingrid Mata,: 1960,61 y.o. Sex: female          Ingrid Milian was seen today for follow-up. Diagnoses and all orders for this visit:    Rheumatoid arthritis involving multiple sites with positive rheumatoid factor (HCC)    High risk medication use  -     Sedimentation Rate  -     Creatinine, Serum  -     C-Reactive Protein  -     CBC Auto Differential  -     AST(SGOT) & ALT(SGPT)    Other orders  -     sulfaSALAzine (AZULFIDINE) 500 MG tablet; Take  2 tab po BID      Rheumatoid arthritis-RF 26, negative CCP-asymptomatic on sulfasalazine 1 g twice a day. Mild intercurrent musculoskeletal discomfort-hands, ankle and feet-likely noninflammatory from osteoarthritis. Plan-check safety and disease activity labs as above. Continue current medications. She will receive COVID-19 vaccine booster. Follow-up in 3 months. Patient indicates understanding and agrees with the management plan. I reviewed patient's history, referral documents and electronic medical records. #######################################################################      Wfotsejzqw-sdbwzt-fmwnlnegviaq rheumatoid arthritis and generalized osteoarthritis.     Interval changes-states that she is doing much better on current regimen but always has some kind of discomfort here and there either in the ankles, feet or hands but nothing really limits from her ADLs and recreational activities. Physical activities does help with her symptoms. Tolerating medications well. No overt flares. Safety labs have been stable. Rest of review of systems are negative. Past medical, surgical history reviewed. Current Outpatient Medications   Medication Sig Dispense Refill    sulfaSALAzine (AZULFIDINE) 500 MG tablet Take  2 tab po  tablet 0    sertraline (ZOLOFT) 50 MG tablet TAKE 1.5 TABS BY MOUTH DAILY 45 tablet 1    sertraline (ZOLOFT) 50 MG tablet TAKE 1 AND 1/2 TABLETS BY MOUTH DAILY 45 tablet 3    sulfaSALAzine (AZULFIDINE) 500 MG tablet Take  2 tab po  tablet 0    sulfaSALAzine (AZULFIDINE) 500 MG tablet Take  2 tab po  tablet 0    fluticasone (FLONASE) 50 MCG/ACT nasal spray Appointment needed when refill is finished(90 DAY SUPPLY X 3 REFILLS) 3 Bottle 3     No current facility-administered medications for this visit. Allergies   Allergen Reactions    Plaquenil [Hydroxychloroquine Sulfate] Rash       PHYSICAL EXAM:    Vitals:    /76   Ht 5' 4\" (1.626 m)   Wt 222 lb (100.7 kg)   BMI 38.11 kg/m²   General appearance/ Psychiatric: well nourished, and well groomed, normal judgement, alert, appears stated age and cooperative. MKS: 28 joint JOINT COUNT:                               Right                                                  Left   Swell Tender Swell Tender   PIP1           PIP2        PIP3       PIP4          PIP5          MCP1           MCP2       MCP3       MCP4           MCP5           Wrist          Elbow           Shoulder          Knee           No appreciable tender swollen or inflamed joints in upper or lower extremities,  F ROM in all peripheral joints.   Normal gait and muscle strength in the upper and lower extremities. DATA:   Lab Results   Component Value Date    WBC 6.9 06/07/2021    HGB 12.8 06/07/2021    HCT 37.8 06/07/2021    MCV 93.1 06/07/2021     06/07/2021         Chemistry        Component Value Date/Time     11/24/2020 1121    K 4.4 11/24/2020 1121     11/24/2020 1121    CO2 22 11/24/2020 1121    BUN 17 11/24/2020 1121    CREATININE 0.8 06/07/2021 1533        Component Value Date/Time    CALCIUM 9.5 11/24/2020 1121    ALKPHOS 90 03/05/2021 1131    AST 19 06/07/2021 1533    ALT 26 06/07/2021 1533    BILITOT <0.2 03/05/2021 1131           Lab Results   Component Value Date    SEDRATE 22 06/07/2021     No results found for: CKTOTAL  Lab Results   Component Value Date    TSH 2.59 01/27/2020     Lab Results   Component Value Date    VITD25 35.2 07/22/2020       Total time 32 minutes that includes the following-  Preparing to see the patient such as reviewing patients records, pre-charting, preparing the visit on the same day, performing a medically appropriate history and physical examination, counseling and educating patient about diagnosis, management plan, ordering appropriate testings, prescriptions, communicating findings to other care providers, and documenting clinical information in electronic medical record. A/P- See above.

## 2021-10-20 DIAGNOSIS — F41.9 ANXIETY: ICD-10-CM

## 2021-10-20 RX ORDER — LORAZEPAM 1 MG/1
TABLET ORAL
Qty: 30 TABLET | Refills: 0 | Status: SHIPPED | OUTPATIENT
Start: 2021-10-20 | End: 2021-12-10 | Stop reason: SDUPTHER

## 2021-11-15 ENCOUNTER — TELEPHONE (OUTPATIENT)
Dept: INTERNAL MEDICINE CLINIC | Age: 61
End: 2021-11-15

## 2021-11-22 ENCOUNTER — OFFICE VISIT (OUTPATIENT)
Dept: RHEUMATOLOGY | Age: 61
End: 2021-11-22
Payer: COMMERCIAL

## 2021-11-22 VITALS
HEIGHT: 64 IN | DIASTOLIC BLOOD PRESSURE: 74 MMHG | BODY MASS INDEX: 37.9 KG/M2 | WEIGHT: 222 LBS | SYSTOLIC BLOOD PRESSURE: 120 MMHG

## 2021-11-22 DIAGNOSIS — M05.79 RHEUMATOID ARTHRITIS INVOLVING MULTIPLE SITES WITH POSITIVE RHEUMATOID FACTOR (HCC): Primary | ICD-10-CM

## 2021-11-22 DIAGNOSIS — Z79.899 HIGH RISK MEDICATION USE: ICD-10-CM

## 2021-11-22 PROCEDURE — 1036F TOBACCO NON-USER: CPT | Performed by: INTERNAL MEDICINE

## 2021-11-22 PROCEDURE — G8417 CALC BMI ABV UP PARAM F/U: HCPCS | Performed by: INTERNAL MEDICINE

## 2021-11-22 PROCEDURE — G8484 FLU IMMUNIZE NO ADMIN: HCPCS | Performed by: INTERNAL MEDICINE

## 2021-11-22 PROCEDURE — 99214 OFFICE O/P EST MOD 30 MIN: CPT | Performed by: INTERNAL MEDICINE

## 2021-11-22 PROCEDURE — 3017F COLORECTAL CA SCREEN DOC REV: CPT | Performed by: INTERNAL MEDICINE

## 2021-11-22 PROCEDURE — G8427 DOCREV CUR MEDS BY ELIG CLIN: HCPCS | Performed by: INTERNAL MEDICINE

## 2021-11-22 RX ORDER — SULFASALAZINE 500 MG/1
TABLET ORAL
Qty: 360 TABLET | Refills: 0 | Status: SHIPPED | OUTPATIENT
Start: 2021-11-22 | End: 2022-02-22 | Stop reason: SDUPTHER

## 2021-11-22 NOTE — PROGRESS NOTES
calorie intake and promoting weight loss which depends on your preferences, food habits, and perceived ability to manage hunger, sustain adherence. If you choose Low-carbohydrate diet- healthy choices for fat (mono- and polyunsaturated) and protein (fish, nuts, legumes, and poultry. If you choose low-fat diet - decrease in fat should be accompanied by increases in healthy carbohydrates (fruits, vegetables, whole grains) Exercise and behavioral interventions help individuals maintain weight loss. She did try special diet in the past, worked well, thinking of doing it again. Follow-up in 3 months. Patient indicates understanding and agrees with the management plan. I reviewed patient's history, referral documents and electronic medical records. #######################################################################      Vvjlgwwpvn-mnlmnx-vtwsthcmkjon rheumatoid arthritis and generalized osteoarthritis. Interval Dois Folds is doing well no musculoskeletal complaints or concerns. Has been gaining weight, is concerned about it. Normal ADLs and recreational activities. No other complaints or concerns. Past medical, surgical history reviewed. Current Outpatient Medications   Medication Sig Dispense Refill    sulfaSALAzine (AZULFIDINE) 500 MG tablet Take  2 tab po  tablet 0    sertraline (ZOLOFT) 50 MG tablet Take 1.5 tablets by mouth daily 135 tablet 1    sulfaSALAzine (AZULFIDINE) 500 MG tablet Take  2 tab po  tablet 0    sulfaSALAzine (AZULFIDINE) 500 MG tablet Take  2 tab po  tablet 0    sulfaSALAzine (AZULFIDINE) 500 MG tablet Take  2 tab po  tablet 0    fluticasone (FLONASE) 50 MCG/ACT nasal spray Appointment needed when refill is finished(90 DAY SUPPLY X 3 REFILLS) 3 Bottle 3     No current facility-administered medications for this visit.      Allergies   Allergen Reactions    Plaquenil [Hydroxychloroquine Sulfate] Rash       PHYSICAL EXAM:    Vitals:    BP 120/74   Ht 5' 4\" (1.626 m)   Wt 222 lb (100.7 kg)   BMI 38.11 kg/m²   General appearance/ Psychiatric: well nourished, and well groomed, normal judgement, alert, appears stated age and cooperative. MKS: 28 joint JOINT COUNT:                               Right                                                  Left   Swell Tender Swell Tender   PIP1           PIP2        PIP3       PIP4          PIP5          MCP1           MCP2       MCP3       MCP4           MCP5           Wrist          Elbow           Shoulder          Knee             No appreciable tender swollen or inflamed joints in upper or lower extremities. 11/22/2021    No rashes. DATA:   Lab Results   Component Value Date    WBC 6.6 08/23/2021    HGB 13.4 08/23/2021    HCT 39.3 08/23/2021    MCV 90.7 08/23/2021     08/23/2021         Chemistry        Component Value Date/Time     11/24/2020 1121    K 4.4 11/24/2020 1121     11/24/2020 1121    CO2 22 11/24/2020 1121    BUN 17 11/24/2020 1121    CREATININE 0.6 08/23/2021 1319        Component Value Date/Time    CALCIUM 9.5 11/24/2020 1121    ALKPHOS 90 03/05/2021 1131    AST 26 08/23/2021 1319    ALT 40 08/23/2021 1319    BILITOT <0.2 03/05/2021 1131           Lab Results   Component Value Date    SEDRATE 18 08/23/2021     No results found for: CKTOTAL  Lab Results   Component Value Date    TSH 2.59 01/27/2020     Lab Results   Component Value Date    VITD25 35.2 07/22/2020       Total time 33 minutes that includes the following-  Preparing to see the patient such as reviewing patients records, pre-charting, preparing the visit on the same day, performing a medically appropriate history and physical examination, counseling and educating patient about diagnosis, management plan, ordering appropriate testings, prescriptions, communicating findings to other care providers, and documenting clinical information in electronic medical record. A/P- See above.

## 2021-12-10 ENCOUNTER — OFFICE VISIT (OUTPATIENT)
Dept: INTERNAL MEDICINE CLINIC | Age: 61
End: 2021-12-10
Payer: COMMERCIAL

## 2021-12-10 VITALS
SYSTOLIC BLOOD PRESSURE: 122 MMHG | HEIGHT: 64 IN | TEMPERATURE: 97.6 F | BODY MASS INDEX: 37.76 KG/M2 | HEART RATE: 84 BPM | DIASTOLIC BLOOD PRESSURE: 82 MMHG | OXYGEN SATURATION: 96 % | WEIGHT: 221.2 LBS

## 2021-12-10 DIAGNOSIS — M05.79 RHEUMATOID ARTHRITIS INVOLVING MULTIPLE SITES WITH POSITIVE RHEUMATOID FACTOR (HCC): ICD-10-CM

## 2021-12-10 DIAGNOSIS — E78.00 PURE HYPERCHOLESTEROLEMIA: ICD-10-CM

## 2021-12-10 DIAGNOSIS — R53.83 FATIGUE, UNSPECIFIED TYPE: Primary | ICD-10-CM

## 2021-12-10 DIAGNOSIS — E66.09 CLASS 2 OBESITY DUE TO EXCESS CALORIES WITHOUT SERIOUS COMORBIDITY WITH BODY MASS INDEX (BMI) OF 36.0 TO 36.9 IN ADULT: ICD-10-CM

## 2021-12-10 DIAGNOSIS — G43.009 MIGRAINE WITHOUT AURA AND WITHOUT STATUS MIGRAINOSUS, NOT INTRACTABLE: ICD-10-CM

## 2021-12-10 DIAGNOSIS — F41.9 ANXIETY: ICD-10-CM

## 2021-12-10 PROCEDURE — 1036F TOBACCO NON-USER: CPT | Performed by: INTERNAL MEDICINE

## 2021-12-10 PROCEDURE — G8417 CALC BMI ABV UP PARAM F/U: HCPCS | Performed by: INTERNAL MEDICINE

## 2021-12-10 PROCEDURE — G8484 FLU IMMUNIZE NO ADMIN: HCPCS | Performed by: INTERNAL MEDICINE

## 2021-12-10 PROCEDURE — 99214 OFFICE O/P EST MOD 30 MIN: CPT | Performed by: INTERNAL MEDICINE

## 2021-12-10 PROCEDURE — 3017F COLORECTAL CA SCREEN DOC REV: CPT | Performed by: INTERNAL MEDICINE

## 2021-12-10 PROCEDURE — G8427 DOCREV CUR MEDS BY ELIG CLIN: HCPCS | Performed by: INTERNAL MEDICINE

## 2021-12-10 RX ORDER — LORAZEPAM 1 MG/1
TABLET ORAL
Qty: 30 TABLET | Refills: 0 | Status: SHIPPED | OUTPATIENT
Start: 2021-12-10 | End: 2022-01-10

## 2021-12-10 SDOH — ECONOMIC STABILITY: FOOD INSECURITY: WITHIN THE PAST 12 MONTHS, THE FOOD YOU BOUGHT JUST DIDN'T LAST AND YOU DIDN'T HAVE MONEY TO GET MORE.: NEVER TRUE

## 2021-12-10 SDOH — ECONOMIC STABILITY: FOOD INSECURITY: WITHIN THE PAST 12 MONTHS, YOU WORRIED THAT YOUR FOOD WOULD RUN OUT BEFORE YOU GOT MONEY TO BUY MORE.: NEVER TRUE

## 2021-12-10 ASSESSMENT — ENCOUNTER SYMPTOMS
COLOR CHANGE: 0
WHEEZING: 0
CHEST TIGHTNESS: 0
BACK PAIN: 0
ABDOMINAL PAIN: 0

## 2021-12-10 ASSESSMENT — SOCIAL DETERMINANTS OF HEALTH (SDOH): HOW HARD IS IT FOR YOU TO PAY FOR THE VERY BASICS LIKE FOOD, HOUSING, MEDICAL CARE, AND HEATING?: NOT HARD AT ALL

## 2021-12-10 NOTE — PATIENT INSTRUCTIONS
[facility]  [facility]  Solomon Elliott  [phone]    Dr. Kiko Martinez and associates  [facility]  [phone]  Yonatan Taylor.      Dr. Rachelle Shin. [phone]    Dr. Shashank White and all partners [facility] or [facility] [phone]    Dr. Rohini Betancourt [phone] Kimberly Knox [phone]    Dr. Kailey Doss primary care consider [facility] and partners OR Dr. Mercedes eZe in my office

## 2021-12-10 NOTE — PROGRESS NOTES
Subjective:      Patient ID: Logan Mota is a 63 y.o. female. Chief Complaint   Patient presents with    Skin Tag     Doing well- back on her diet and trying to work on exercise- joint pain is well controlled- migraines are fairly well controlled- ibs is also better controlled w less stress w work and home issues- kids will all be in [place] [address] next year- sleeping better     Review of Systems   Constitutional: Negative for activity change, appetite change and fatigue. HENT: Negative for congestion. Eyes: Negative for visual disturbance. Respiratory: Negative for chest tightness and wheezing. Cardiovascular: Negative for chest pain and palpitations. Gastrointestinal: Negative for abdominal pain. Musculoskeletal: Negative for arthralgias and back pain. Skin: Negative for color change and rash. Neurological: Negative for weakness, light-headedness and headaches. Psychiatric/Behavioral: Negative for sleep disturbance. The patient is not nervous/anxious. Family History   Problem Relation Age of Onset    Lung Cancer Father      Social History     Socioeconomic History    Marital status:      Spouse name: Not on file    Number of children: Not on file    Years of education: Not on file    Highest education level: Not on file   Occupational History    Not on file   Tobacco Use    Smoking status: Never Smoker    Smokeless tobacco: Never Used   Substance and Sexual Activity    Alcohol use:  Yes     Alcohol/week: 0.0 standard drinks     Comment: occasionally    Drug use: No    Sexual activity: Not Currently   Other Topics Concern    Not on file   Social History Narrative    Not on file     Social Determinants of Health     Financial Resource Strain: Low Risk     Difficulty of Paying Living Expenses: Not hard at all   Food Insecurity: No Food Insecurity    Worried About [address] in the Last Year: Never true    Jannet of Food in the Last Year: Never true Transportation Needs:     Lack of Transportation (Medical): Not on file    Lack of Transportation (Non-Medical): Not on file   Physical Activity:     Days of Exercise per Week: Not on file    Minutes of Exercise per Session: Not on file   Stress:     Feeling of Stress : Not on file   Social Connections:     Frequency of Communication with Friends and Family: Not on file    Frequency of Social Gatherings with Friends and Family: Not on file    Attends [employer] Services: Not on file    Active Member of [address] [place] [address] [employer] or Organizations: Not on file    Attends Club or Organization Meetings: Not on file    Marital Status: Not on file   Intimate Partner Violence:     Fear of Current or Ex-Partner: Not on file    Emotionally Abused: Not on file    Physically Abused: Not on file    Sexually Abused: Not on file   Housing Stability:     Unable to Pay for Housing in the Last Year: Not on file    Number of [place] in the Last Year: Not on file    Unstable Housing in the Last Year: Not on file         Objective:   Physical Exam  Constitutional:       Appearance: She is well-developed. HENT:      Head: Normocephalic and atraumatic. Eyes:      Conjunctiva/sclera: Conjunctivae normal.      Pupils: Pupils are equal, round, and reactive to light. Cardiovascular:      Rate and Rhythm: Normal rate and regular rhythm. Heart sounds: Normal heart sounds. Pulmonary:      Effort: Pulmonary effort is normal. No respiratory distress. Breath sounds: Normal breath sounds. Abdominal:      General: There is no distension. Tenderness: There is no abdominal tenderness. Musculoskeletal:      Cervical back: Normal range of motion and neck supple. Lymphadenopathy:      Cervical: No cervical adenopathy. Skin:     General: Skin is warm and dry. Neurological:      Mental Status: She is alert and oriented to person, place, and time.    Psychiatric:         Mood and Affect: Mood normal.         Behavior: Behavior normal.         Thought Content: Thought content normal.         Judgment: Judgment normal.           Assessment and Plan:      Migraine without aura and without status migrainosus, not intractable   Well controlled     Anxiety   Controlled     Class 2 obesity due to excess calories without serious comorbidity with body mass index (BMI) of 36.0 to 36.9 in adult   To restart diet now     Rheumatoid [facility] involving multiple sites with [facility]   Cont f/u w rheum     Hyperlipemia   Cont to follow diet        Encounter Diagnoses   Name Primary?     Fatigue, unspecified type Yes    Pure hypercholesterolemia     Migraine without aura and without status migrainosus, not intractable     Anxiety     Class 2 obesity due to excess calories without serious comorbidity with body mass index (BMI) of 36.0 to 36.9 in adult     Rheumatoid arthritis involving multiple sites with positive rheumatoid factor ([facility]        Orders Placed This Encounter   Procedures    Lipid Panel     Standing Status:   Future     Standing Expiration Date:   12/10/2022     Order Specific Question:   Is Patient Fasting?/# of Hours     Answer:   yes/10    TSH without Reflex     Standing Status:   Future     Standing Expiration Date:   12/10/2022

## 2021-12-21 DIAGNOSIS — Z79.899 HIGH RISK MEDICATION USE: ICD-10-CM

## 2021-12-21 LAB
A/G RATIO: 1.8 (ref 1.1–2.2)
ALBUMIN SERPL-MCNC: 4.4 G/DL (ref 3.4–5)
ALP BLD-CCNC: 89 U/L (ref 40–129)
ALT SERPL-CCNC: 38 U/L (ref 10–40)
ANION GAP SERPL CALCULATED.3IONS-SCNC: 13 MMOL/L (ref 3–16)
AST SERPL-CCNC: 21 U/L (ref 15–37)
BASOPHILS ABSOLUTE: 0 K/UL (ref 0–0.2)
BASOPHILS RELATIVE PERCENT: 0.3 %
BILIRUB SERPL-MCNC: 0.3 MG/DL (ref 0–1)
BUN BLDV-MCNC: 16 MG/DL (ref 7–20)
C-REACTIVE PROTEIN: 6.3 MG/L (ref 0–5.1)
CALCIUM SERPL-MCNC: 9.6 MG/DL (ref 8.3–10.6)
CHLORIDE BLD-SCNC: 106 MMOL/L (ref 99–110)
CO2: 21 MMOL/L (ref 21–32)
CREAT SERPL-MCNC: 0.6 MG/DL (ref 0.6–1.1)
EOSINOPHILS ABSOLUTE: 0.1 K/UL (ref 0–0.6)
EOSINOPHILS RELATIVE PERCENT: 1.2 %
GFR AFRICAN AMERICAN: >60
GFR NON-AFRICAN AMERICAN: >60
GLUCOSE BLD-MCNC: 105 MG/DL (ref 70–99)
HCT VFR BLD CALC: 42.5 % (ref 36–48)
HEMOGLOBIN: 14.3 G/DL (ref 12–16)
LYMPHOCYTES ABSOLUTE: 2.3 K/UL (ref 1–5.1)
LYMPHOCYTES RELATIVE PERCENT: 38.5 %
MCH RBC QN AUTO: 30.8 PG (ref 26–34)
MCHC RBC AUTO-ENTMCNC: 33.6 G/DL (ref 31–36)
MCV RBC AUTO: 91.5 FL (ref 80–100)
MONOCYTES ABSOLUTE: 0.5 K/UL (ref 0–1.3)
MONOCYTES RELATIVE PERCENT: 8.4 %
NEUTROPHILS ABSOLUTE: 3.1 K/UL (ref 1.7–7.7)
NEUTROPHILS RELATIVE PERCENT: 51.6 %
PDW BLD-RTO: 12.7 % (ref 12.4–15.4)
PLATELET # BLD: 205 K/UL (ref 135–450)
PMV BLD AUTO: 10.6 FL (ref 5–10.5)
POTASSIUM SERPL-SCNC: 4.3 MMOL/L (ref 3.5–5.1)
RBC # BLD: 4.64 M/UL (ref 4–5.2)
SEDIMENTATION RATE, ERYTHROCYTE: 24 MM/HR (ref 0–30)
SODIUM BLD-SCNC: 140 MMOL/L (ref 136–145)
TOTAL PROTEIN: 6.9 G/DL (ref 6.4–8.2)
WBC # BLD: 5.9 K/UL (ref 4–11)

## 2021-12-21 PROCEDURE — 36415 COLL VENOUS BLD VENIPUNCTURE: CPT | Performed by: INTERNAL MEDICINE

## 2022-01-27 DIAGNOSIS — R73.9 HYPERGLYCEMIA: ICD-10-CM

## 2022-01-27 DIAGNOSIS — E66.09 CLASS 2 OBESITY DUE TO EXCESS CALORIES WITHOUT SERIOUS COMORBIDITY WITH BODY MASS INDEX (BMI) OF 36.0 TO 36.9 IN ADULT: ICD-10-CM

## 2022-01-27 DIAGNOSIS — E78.00 PURE HYPERCHOLESTEROLEMIA: Primary | ICD-10-CM

## 2022-01-27 DIAGNOSIS — R53.82 CHRONIC FATIGUE: ICD-10-CM

## 2022-02-22 ENCOUNTER — OFFICE VISIT (OUTPATIENT)
Dept: RHEUMATOLOGY | Age: 62
End: 2022-02-22
Payer: COMMERCIAL

## 2022-02-22 VITALS
BODY MASS INDEX: 37.73 KG/M2 | HEIGHT: 64 IN | WEIGHT: 221 LBS | DIASTOLIC BLOOD PRESSURE: 76 MMHG | SYSTOLIC BLOOD PRESSURE: 100 MMHG

## 2022-02-22 DIAGNOSIS — M05.79 RHEUMATOID ARTHRITIS INVOLVING MULTIPLE SITES WITH POSITIVE RHEUMATOID FACTOR (HCC): Primary | ICD-10-CM

## 2022-02-22 DIAGNOSIS — Z79.899 HIGH RISK MEDICATION USE: ICD-10-CM

## 2022-02-22 PROCEDURE — 1036F TOBACCO NON-USER: CPT | Performed by: INTERNAL MEDICINE

## 2022-02-22 PROCEDURE — G8427 DOCREV CUR MEDS BY ELIG CLIN: HCPCS | Performed by: INTERNAL MEDICINE

## 2022-02-22 PROCEDURE — G8417 CALC BMI ABV UP PARAM F/U: HCPCS | Performed by: INTERNAL MEDICINE

## 2022-02-22 PROCEDURE — 99214 OFFICE O/P EST MOD 30 MIN: CPT | Performed by: INTERNAL MEDICINE

## 2022-02-22 PROCEDURE — G8484 FLU IMMUNIZE NO ADMIN: HCPCS | Performed by: INTERNAL MEDICINE

## 2022-02-22 PROCEDURE — 3017F COLORECTAL CA SCREEN DOC REV: CPT | Performed by: INTERNAL MEDICINE

## 2022-02-22 RX ORDER — SULFASALAZINE 500 MG/1
TABLET ORAL
Qty: 360 TABLET | Refills: 0 | Status: SHIPPED | OUTPATIENT
Start: 2022-02-22 | End: 2022-08-24 | Stop reason: SDUPTHER

## 2022-02-22 RX ORDER — SULFASALAZINE 500 MG/1
TABLET ORAL
Qty: 360 TABLET | Refills: 0 | Status: SHIPPED | OUTPATIENT
Start: 2022-02-22 | End: 2022-02-22

## 2022-02-22 NOTE — PROGRESS NOTES
[address] [place] [address] (n) [phone] (S)      Dear Dr. Darryl Valenzuela MD:  Please find Rheumatology assessment. Thank you for giving me the opportunity to be involved in [facility] care and I look forward following Ld Calzada along with you. If you have any questions or concerns please feel free to reach me. Note is transcribed using voice recognition software. Inadvertent computerized transcription errors may be present. Patient identification: Ld Mata,: 1960,61 y.o. Sex: female          Ld Calzada was seen today for follow-up. Diagnoses and all orders for this visit:    Rheumatoid arthritis involving multiple sites with positive rheumatoid factor ([facility] [address] [facility]    High risk medication use  -     Comprehensive Metabolic Panel; Future  -     CBC with Auto Differential; Future    Other orders  -     Discontinue: sulfaSALAzine (AZULFIDINE) 500 MG tablet; Take  2 tab po BID      Rheumatoid arthritis-RF 26, negative CCP-asymptomatic on sulfasalazine 1 g twice daily. Check safety labs in a month. Continue current meds, prescription authorized. BMI 37 recorded last time, lost 20 pounds since last seen, encouraged patient to continue watching calories, exercise. Went over mindful eating, calorie count, and avoiding refined carbohydrate, substituting complex carbohydrate from vegetables. Follow-up in 3 months. Patient indicates understanding and agrees with the management plan. I reviewed patient's history, referral documents and electronic medical records.   #######################################################################      Kopwatqdwj-flofze-mskvxidizkel rheumatoid arthritis and generalized osteoarthritis. Carter Salmeron is doing very well in terms of rheumatoid arthritis, denies any pain swelling in her joints. Tolerating sulfasalazine well. Is followed by dermatology for management of alopecia. This might be related to rapid weight loss, most of the time hair regrows once the new cycle of hair follicle starts. Normal ADLs and recreational activities. No other complaints or concerns. Past medical, surgical history reviewed. Current Outpatient Medications   Medication Sig Dispense Refill    sertraline (ZOLOFT) 50 MG tablet Take 1.5 tablets by mouth daily 135 tablet 3    fluticasone (FLONASE) 50 MCG/ACT nasal spray Appointment needed when refill is finished(90 DAY SUPPLY X 3 REFILLS) 3 Bottle 3    sulfaSALAzine (AZULFIDINE) 500 MG tablet Take  2 tab po  tablet 0     No current facility-administered medications for this visit. Allergies   Allergen Reactions    Plaquenil [Hydroxychloroquine Sulfate] Rash       PHYSICAL EXAM:    Vitals:    /76   Ht 5' 4\" (1.626 m)   Wt 221 lb (100.2 kg)   BMI 37.93 kg/m²   General appearance/ Psychiatric: well nourished, and well groomed, normal judgement, alert, appears stated age and cooperative. MKS: 28 joint JOINT COUNT:                               Right                                                  Left   Swell Tender Swell Tender   PIP1           PIP2        PIP3       PIP4          PIP5          MCP1           MCP2       MCP3       MCP4           MCP5           Wrist          Elbow           Shoulder          Knee             No appreciable tender swollen inflamed joints in upper or lower extremities, firing all other joints. Hair thinning is noted  No rashes.     DATA:   Lab Results   Component Value Date    WBC 5.9 12/21/2021    HGB 14.3 12/21/2021    HCT 42.5 12/21/2021    MCV 91.5 12/21/2021     12/21/2021         Chemistry        Component Value Date/Time     12/21/2021 1115    K 4.3 12/21/2021 1115     12/21/2021 1115    CO2 21 12/21/2021 1115    BUN 16 12/21/2021 1115    CREATININE 0.6 12/21/2021 1115        Component Value Date/Time    CALCIUM 9.6 12/21/2021 1115    ALKPHOS 89 12/21/2021 1115    AST 21 12/21/2021 1115    ALT 38 12/21/2021 1115    BILITOT 0.3 12/21/2021 1115           Lab Results   Component Value Date    SEDRATE 24 12/21/2021     No results found for: CKTOTAL  Lab Results   Component Value Date    TSH 2.59 01/27/2020     Lab Results   Component Value Date    VITD25 35.2 07/22/2020       Total time 32 minutes that includes the following-  Preparing to see the patient such as reviewing patients records, pre-charting, preparing the visit on the same day, performing a medically appropriate history and physical examination, counseling and educating patient about diagnosis, management plan, ordering appropriate testings, prescriptions, communicating findings to other care providers, and documenting clinical information in electronic medical record. A/P- See above.

## 2022-02-28 DIAGNOSIS — Z79.899 HIGH RISK MEDICATION USE: ICD-10-CM

## 2022-02-28 DIAGNOSIS — E78.00 PURE HYPERCHOLESTEROLEMIA: ICD-10-CM

## 2022-02-28 DIAGNOSIS — R53.82 CHRONIC FATIGUE: ICD-10-CM

## 2022-02-28 DIAGNOSIS — E66.09 CLASS 2 OBESITY DUE TO EXCESS CALORIES WITHOUT SERIOUS COMORBIDITY WITH BODY MASS INDEX (BMI) OF 36.0 TO 36.9 IN ADULT: ICD-10-CM

## 2022-02-28 DIAGNOSIS — R73.9 HYPERGLYCEMIA: ICD-10-CM

## 2022-02-28 LAB
A/G RATIO: 1.8 (ref 1.1–2.2)
ALBUMIN SERPL-MCNC: 4.6 G/DL (ref 3.4–5)
ALP BLD-CCNC: 97 U/L (ref 40–129)
ALT SERPL-CCNC: 26 U/L (ref 10–40)
ANION GAP SERPL CALCULATED.3IONS-SCNC: 18 MMOL/L (ref 3–16)
AST SERPL-CCNC: 20 U/L (ref 15–37)
BASOPHILS ABSOLUTE: 0 K/UL (ref 0–0.2)
BASOPHILS RELATIVE PERCENT: 0.2 %
BILIRUB SERPL-MCNC: 0.4 MG/DL (ref 0–1)
BUN BLDV-MCNC: 17 MG/DL (ref 7–20)
C-REACTIVE PROTEIN: 11.9 MG/L (ref 0–5.1)
CALCIUM SERPL-MCNC: 9.8 MG/DL (ref 8.3–10.6)
CHLORIDE BLD-SCNC: 106 MMOL/L (ref 99–110)
CHOLESTEROL, TOTAL: 260 MG/DL (ref 0–199)
CO2: 22 MMOL/L (ref 21–32)
CREAT SERPL-MCNC: 0.7 MG/DL (ref 0.6–1.1)
EOSINOPHILS ABSOLUTE: 0.1 K/UL (ref 0–0.6)
EOSINOPHILS RELATIVE PERCENT: 1.1 %
GFR AFRICAN AMERICAN: >60
GFR NON-AFRICAN AMERICAN: >60
GLUCOSE BLD-MCNC: 95 MG/DL (ref 70–99)
HCT VFR BLD CALC: 44.2 % (ref 36–48)
HDLC SERPL-MCNC: 53 MG/DL (ref 40–60)
HEMOGLOBIN: 14.4 G/DL (ref 12–16)
LDL CHOLESTEROL CALCULATED: 188 MG/DL
LYMPHOCYTES ABSOLUTE: 1.7 K/UL (ref 1–5.1)
LYMPHOCYTES RELATIVE PERCENT: 26.7 %
MCH RBC QN AUTO: 30 PG (ref 26–34)
MCHC RBC AUTO-ENTMCNC: 32.7 G/DL (ref 31–36)
MCV RBC AUTO: 91.9 FL (ref 80–100)
MONOCYTES ABSOLUTE: 0.6 K/UL (ref 0–1.3)
MONOCYTES RELATIVE PERCENT: 9.3 %
NEUTROPHILS ABSOLUTE: 4.1 K/UL (ref 1.7–7.7)
NEUTROPHILS RELATIVE PERCENT: 62.7 %
PDW BLD-RTO: 12.7 % (ref 12.4–15.4)
PLATELET # BLD: 199 K/UL (ref 135–450)
PMV BLD AUTO: 11.1 FL (ref 5–10.5)
POTASSIUM SERPL-SCNC: 4.4 MMOL/L (ref 3.5–5.1)
RBC # BLD: 4.81 M/UL (ref 4–5.2)
SEDIMENTATION RATE, ERYTHROCYTE: 44 MM/HR (ref 0–30)
SODIUM BLD-SCNC: 146 MMOL/L (ref 136–145)
TOTAL PROTEIN: 7.2 G/DL (ref 6.4–8.2)
TRIGL SERPL-MCNC: 96 MG/DL (ref 0–150)
TSH SERPL DL<=0.05 MIU/L-ACNC: 2.64 UIU/ML (ref 0.27–4.2)
VLDLC SERPL CALC-MCNC: 19 MG/DL
WBC # BLD: 6.5 K/UL (ref 4–11)

## 2022-03-01 LAB
ESTIMATED AVERAGE GLUCOSE: 111.2 MG/DL
HBA1C MFR BLD: 5.5 %

## 2022-03-04 ENCOUNTER — OFFICE VISIT (OUTPATIENT)
Dept: INTERNAL MEDICINE CLINIC | Age: 62
End: 2022-03-04
Payer: COMMERCIAL

## 2022-03-04 VITALS
BODY MASS INDEX: 33.8 KG/M2 | OXYGEN SATURATION: 96 % | WEIGHT: 198 LBS | TEMPERATURE: 97.3 F | SYSTOLIC BLOOD PRESSURE: 120 MMHG | HEART RATE: 62 BPM | DIASTOLIC BLOOD PRESSURE: 82 MMHG | HEIGHT: 64 IN

## 2022-03-04 DIAGNOSIS — Z12.31 ENCOUNTER FOR SCREENING MAMMOGRAM FOR MALIGNANT NEOPLASM OF BREAST: Primary | ICD-10-CM

## 2022-03-04 DIAGNOSIS — E78.00 PURE HYPERCHOLESTEROLEMIA: ICD-10-CM

## 2022-03-04 DIAGNOSIS — L65.9 HAIR THINNING: ICD-10-CM

## 2022-03-04 DIAGNOSIS — Z00.00 ENCOUNTER FOR WELL ADULT EXAM WITHOUT ABNORMAL FINDINGS: ICD-10-CM

## 2022-03-04 DIAGNOSIS — M05.79 RHEUMATOID ARTHRITIS INVOLVING MULTIPLE SITES WITH POSITIVE RHEUMATOID FACTOR (HCC): ICD-10-CM

## 2022-03-04 DIAGNOSIS — E66.09 CLASS 2 OBESITY DUE TO EXCESS CALORIES WITHOUT SERIOUS COMORBIDITY WITH BODY MASS INDEX (BMI) OF 36.0 TO 36.9 IN ADULT: ICD-10-CM

## 2022-03-04 DIAGNOSIS — F41.9 ANXIETY: ICD-10-CM

## 2022-03-04 DIAGNOSIS — K64.4 SKIN TAGS, ANUS OR RECTUM: ICD-10-CM

## 2022-03-04 PROBLEM — M25.541 ARTHRALGIA OF BOTH HANDS: Status: RESOLVED | Noted: 2019-04-08 | Resolved: 2022-03-04

## 2022-03-04 PROBLEM — M25.542 ARTHRALGIA OF BOTH HANDS: Status: RESOLVED | Noted: 2019-04-08 | Resolved: 2022-03-04

## 2022-03-04 PROCEDURE — G8484 FLU IMMUNIZE NO ADMIN: HCPCS | Performed by: INTERNAL MEDICINE

## 2022-03-04 PROCEDURE — 99396 PREV VISIT EST AGE 40-64: CPT | Performed by: INTERNAL MEDICINE

## 2022-03-04 RX ORDER — SPIRONOLACTONE 50 MG/1
TABLET, FILM COATED ORAL
COMMUNITY
Start: 2022-02-28

## 2022-03-04 RX ORDER — LORAZEPAM 0.5 MG/1
0.5 TABLET ORAL EVERY 6 HOURS PRN
COMMUNITY

## 2022-03-04 RX ORDER — CLOTRIMAZOLE 1 G/ML
SOLUTION TOPICAL
COMMUNITY
Start: 2021-12-18 | End: 2022-05-24 | Stop reason: ALTCHOICE

## 2022-03-04 ASSESSMENT — PATIENT HEALTH QUESTIONNAIRE - PHQ9
SUM OF ALL RESPONSES TO PHQ9 QUESTIONS 1 & 2: 0
SUM OF ALL RESPONSES TO PHQ QUESTIONS 1-9: 0
SUM OF ALL RESPONSES TO PHQ QUESTIONS 1-9: 0
1. LITTLE INTEREST OR PLEASURE IN DOING THINGS: 0
SUM OF ALL RESPONSES TO PHQ QUESTIONS 1-9: 0
SUM OF ALL RESPONSES TO PHQ QUESTIONS 1-9: 0
2. FEELING DOWN, DEPRESSED OR HOPELESS: 0

## 2022-03-04 ASSESSMENT — ENCOUNTER SYMPTOMS
COUGH: 0
WHEEZING: 0
ABDOMINAL PAIN: 0

## 2022-03-04 NOTE — PROGRESS NOTES
Well Adult Note  Name: Jax Astudillo Date: 3/4/2022   MRN: <[id number]> Sex: Female   Age: 63 y.o. Ethnicity: Non- / Non    : 1960 Race: White (non-)      Tri Collado is here for well adult exam.  History:has lost a LOT of weight w current program- [employer] work has really worked for her- feels well- joints feel great w wt loss and help of        Review of Systems   Constitutional: Negative for activity change, fatigue and fever. Respiratory: Negative for cough and wheezing. Gastrointestinal: Negative for abdominal pain. Neurological: Negative for syncope and headaches. Allergies   Allergen Reactions    Plaquenil [Hydroxychloroquine Sulfate] Rash         Prior to Visit Medications    Medication Sig Taking? Authorizing Provider   spironolactone (ALDACTONE) 50 MG tablet  Yes Historical Provider, MD   LORazepam (ATIVAN) 0.5 MG tablet Take 0.5 mg by mouth every 6 hours as needed.  Yes Historical Provider, MD   clotrimazole (LOTRIMIN) 1 % external solution  Yes Historical Provider, MD   MINOXIDIL, TOPICAL, 5 % SOLN Apply topically Yes Historical Provider, MD   sulfaSALAzine (AZULFIDINE) 500 MG tablet Take  2 tab po BID Yes Florinda Rosen MD   sertraline (ZOLOFT) 50 MG tablet Take 1.5 tablets by mouth daily Yes Jason Hood MD   fluticasone (FLONASE) 50 MCG/ACT nasal spray Appointment needed when refill is finished(90 DAY SUPPLY X 3 REFILLS) Yes Jason Hood MD         Past Medical History:   Diagnosis Date    Allergic rhinitis, cause unspecified     chronic    Anxiety state, unspecified     Blepharophimosis     Contact dermatitis and other eczema, due to unspecified cause     Depressive disorder, not elsewhere classified     Headache(784.0)     Hyperglycemia 2014    Max glyco 6.6- down to 5.8  6.1 2016- for bing 2017 and start metformin if still up  Refusing metformin     Insomnia, unspecified     Obesity, unspecified     Other and unspecified hyperlipidemia     Other specified congenital anomaly of skin     Pain in both feet 12/17/2015    S/p fracture 2015     Screening colonoscopy 6/2014    normal-repeat 10 yrs     Unspecified sinusitis (chronic)        Past Surgical History:   Procedure Laterality Date    COLONOSCOPY  7/21/2014    repeat 10 yrs.  HYSTERECTOMY  2000    for prolapse partial    LIPOMA RESECTION  2007    left thigh         Family History   Problem Relation Age of Onset    Lung Cancer Father        Social History     Tobacco Use    Smoking status: Never Smoker    Smokeless tobacco: Never Used   Substance Use Topics    Alcohol use: Yes     Alcohol/week: 0.0 standard drinks     Comment: occasionally    Drug use: No       Objective   /82   Pulse 62   Temp 97.3 °F (36.3 °C)   Ht 5' 4\" (1.626 m)   Wt 198 lb (89.8 kg)   SpO2 96%   BMI 33.99 kg/m²   Wt Readings from Last 3 Encounters:   03/04/22 198 lb (89.8 kg)   02/22/22 221 lb (100.2 kg)   12/10/21 221 lb 3.2 oz (100.3 kg)     There were no vitals filed for this visit. Physical Exam  Constitutional:       Appearance: She is well-developed. HENT:      Head: Normocephalic and atraumatic. Eyes:      Pupils: Pupils are equal, round, and reactive to light. Cardiovascular:      Rate and Rhythm: Normal rate and regular rhythm. Pulmonary:      Effort: Pulmonary effort is normal.      Breath sounds: Normal breath sounds. Skin:     General: Skin is warm and dry. Neurological:      Mental Status: She is alert and oriented to person, place, and time. Psychiatric:         Mood and Affect: Mood normal.         Behavior: Behavior normal.         Thought Content: Thought content normal.         Judgment: Judgment normal.           Assessment   Plan   1. Encounter for screening mammogram for malignant neoplasm of breast  -     PIERO DIGITAL SCREEN W OR WO CAD BILATERAL; Future  2.  Rheumatoid arthritis involving multiple sites with positive rheumatoid factor [facility])  Assessment & Plan:   utd w Dr. Josselyn Ruiz- doing well   3. Pure hypercholesterolemia  Assessment & Plan:   Cont to follow for now-?? Related to meds   4. Class 2 obesity due to excess calories without serious comorbidity with body mass index (BMI) of 36.0 to 36.9 in adult  Assessment & Plan:   Discussed with patient at length health risks of obesity and need for diet and exercise    5. Hair thinning  Assessment & Plan:   Cont rogaine  6. Anxiety  Assessment & Plan:   Well controlled   7.  Skin tags, anus or rectum  -     Geovanna Zuniga MD, Colorectal Surgery, [facility]         Personalized Preventive Plan   Current Health Maintenance Status  Immunization History   Administered Date(s) Administered    COVID-19, Entegrion top, DO NOT Dilute, Allen-Sucrose, 12+ yrs, PF, 30 mcg/0.3 mL dose 03/01/2022    COVID-19, Pfizer Purple top, DILUTE for use, 12+ yrs, 30mcg/0.3mL dose 03/08/2021, 03/28/2021, 08/17/2021    DTP 05/07/2003    DTaP 05/07/2003    Influenza 10/29/2010    Influenza A (O2V8-64) Vaccine PF IM 12/21/2009    Influenza Virus Vaccine 12/15/2015    Influenza, High Dose (Fluzone 65 yrs and older) 09/28/2018    Influenza, Intradermal, Preservative free 11/30/2012, 11/11/2013, 12/15/2015    Influenza, Intradermal, Quadrivalent, Preservative Free 09/12/2016    Influenza, Quadv, IM, PF (6 mo and older Fluzone, Flulaval, Fluarix, and 3 yrs and older Afluria) 11/08/2019, 09/15/2020    Tdap (Boostrix, Adacel) 11/11/2013    Zoster Recombinant (Shingrix) 11/28/2019, 02/19/2020        Health Maintenance   Topic Date Due    Depression Screen  Never done    Cervical cancer screen  12/10/2022 (Originally 4/22/2014)    Potassium monitoring  02/28/2023    Creatinine monitoring  02/28/2023    Breast cancer screen  05/12/2023    DTaP/Tdap/Td vaccine (3 - Td or Tdap) 11/11/2023    Colorectal Cancer Screen  06/30/2024    Lipid screen  02/28/2027    Flu vaccine  Completed    Shingles Vaccine

## 2022-03-16 RX ORDER — FLUTICASONE PROPIONATE 50 MCG
SPRAY, SUSPENSION (ML) NASAL
Qty: 48 G | Refills: 3 | Status: SHIPPED | OUTPATIENT
Start: 2022-03-16

## 2022-03-23 ENCOUNTER — TELEPHONE (OUTPATIENT)
Dept: SURGERY | Age: 62
End: 2022-03-23

## 2022-03-23 NOTE — TELEPHONE ENCOUNTER
Left message for patient to call back and schedule apt with Dr. Saúl Zhao for K64.4 (ICD-10-CM) - Skin tags, anus or rectum    Referral work que

## 2022-04-28 ENCOUNTER — OFFICE VISIT (OUTPATIENT)
Dept: FAMILY MEDICINE CLINIC | Age: 62
End: 2022-04-28
Payer: COMMERCIAL

## 2022-04-28 VITALS
HEART RATE: 80 BPM | OXYGEN SATURATION: 96 % | DIASTOLIC BLOOD PRESSURE: 78 MMHG | SYSTOLIC BLOOD PRESSURE: 126 MMHG | BODY MASS INDEX: 35.07 KG/M2 | TEMPERATURE: 95 F | WEIGHT: 205.4 LBS | HEIGHT: 64 IN

## 2022-04-28 DIAGNOSIS — H93.8X3 EAR FULLNESS, BILATERAL: ICD-10-CM

## 2022-04-28 DIAGNOSIS — M05.79 RHEUMATOID ARTHRITIS INVOLVING MULTIPLE SITES WITH POSITIVE RHEUMATOID FACTOR (HCC): ICD-10-CM

## 2022-04-28 DIAGNOSIS — D17.23 LIPOMA OF RIGHT LOWER EXTREMITY: ICD-10-CM

## 2022-04-28 DIAGNOSIS — N64.4 BREAST PAIN: Primary | ICD-10-CM

## 2022-04-28 DIAGNOSIS — F41.9 ANXIETY: ICD-10-CM

## 2022-04-28 PROCEDURE — 99214 OFFICE O/P EST MOD 30 MIN: CPT | Performed by: FAMILY MEDICINE

## 2022-04-28 RX ORDER — ACETAMINOPHEN 500 MG
1 TABLET ORAL DAILY
Qty: 30 TABLET | Refills: 1 | COMMUNITY
Start: 2022-04-28

## 2022-04-28 RX ORDER — EFINACONAZOLE 100 MG/ML
SOLUTION TOPICAL
COMMUNITY
Start: 2022-04-21

## 2022-04-28 ASSESSMENT — PATIENT HEALTH QUESTIONNAIRE - PHQ9
SUM OF ALL RESPONSES TO PHQ QUESTIONS 1-9: 0
SUM OF ALL RESPONSES TO PHQ9 QUESTIONS 1 & 2: 0
SUM OF ALL RESPONSES TO PHQ QUESTIONS 1-9: 0
2. FEELING DOWN, DEPRESSED OR HOPELESS: 0
1. LITTLE INTEREST OR PLEASURE IN DOING THINGS: 0

## 2022-04-28 NOTE — PROGRESS NOTES
Portions of this chart may have been created with voice recognition software. Occasional wrong-word or \"sound-alike\" substitutions may have occurred due to the inherent limitations of voice recognition software. Read the chart carefully and recognize, using context, where these substitutions have occurred        Chief Complaint     New Patient (est)               SUBJECTIVE    Soha Shearer is a 63 y.o. female here for patient care with me. Patient had some general questions as well as we discussed the following concerns. Patient was wondering about postmenopausal symptoms. Patient had a partial hysterectomy and still has her ovaries present and was wondering if she is through her menopause. Signs and symptoms of perimenopausal and menopausal symptoms, role of laboratory investigations discussed with her today. She does not have any overt menopausal symptoms at this time. Also was wondering about whether she should get BRCA testing. No family history of breast cancers so far. Her daughter had gotten tested for BRCA testing and she was found to be negative. Also was wondering about DEXA scan, no history of previous fractures or any chronic back pain. No family history of osteoporosis. 1. Breast pain  Patient has been experiencing some on and off breast tenderness. She has not felt any masses or lumps or no nipple discharge or drainage. Last mammogram was diagnostic done in May 2021.  - [facility] DIGITAL DIAGNOSTIC AUGMENTED BILATERAL; Future    2. Ear fullness, bilateral  Patient has a sense of ear fullness bilaterally. She does have history of chronic rhinitis and on exam has some serous effusion in middle ear in both ears. Recommended over-the-counter antihistamines, decongestants, nasal corticosteroids as needed for symptoms. will refer to ENT for further evaluation and management  - Betsy Church MD, Otolaryngology, [facility]    3.  Lipoma of right lower extremity  Patient has a small lump in her right thigh. No pain no change in size or any discomfort at this time. Reassurance given    4. Rheumatoid arthritis involving multiple sites with positive rheumatoid factor ([address]  Continues to follow with rheumatologist for her rheumatoid arthritis. Joint symptoms are currently stable    5. Anxiety  Anxiety currently well controlled with Zoloft. She occasionally takes lorazepam maybe once or twice a month to help with the symptoms. REVIEW OF SYSTEMS:  CONSTITUTIONAL: No weight loss, fever, weakness or fatigue. HEENT:No sore throat, runny nose, earache. CARDIOVASCULAR: No chest pain,No palpitations or edema. RESPIRATORY : No shortness of breath, cough. GASTROINTESTINAL: No nausea, vomiting, diarrhea or constipation. No abdominal pain. GENITOURINARY:No dysuria, urgency, frequency, hematuria. NEUROLOGICAL: No headache, dizziness or syncope. PSYCHIATRIC : No mood changes  SKIN: No rash or itching.       Lab Results   Component Value Date    WBC 6.5 02/28/2022    HGB 14.4 02/28/2022    HCT 44.2 02/28/2022    MCV 91.9 02/28/2022     02/28/2022      Lab Results   Component Value Date    LABA1C 5.5 02/28/2022     Lab Results   Component Value Date    .2 02/28/2022     Lab Results   Component Value Date    TSH 2.64 02/28/2022     Lab Results   Component Value Date    CHOL 260 (H) 02/28/2022     Lab Results   Component Value Date    TRIG 96 02/28/2022     Lab Results   Component Value Date    HDL 53 02/28/2022     Lab Results   Component Value Date    LDLCALC 188 (H) 02/28/2022     Lab Results   Component Value Date    LABVLDL 19 02/28/2022     No results found for: [facility]   Lab Results   Component Value Date     (H) 02/28/2022    K 4.4 02/28/2022     02/28/2022    CO2 22 02/28/2022    BUN 17 02/28/2022    CREATININE 0.7 02/28/2022    GLUCOSE 95 02/28/2022    CALCIUM 9.8 02/28/2022    PROT 7.2 02/28/2022    LABALBU 4.6 02/28/2022    BILITOT 0.4 02/28/2022    ALKPHOS 97 02/28/2022    AST 20 02/28/2022    ALT 26 02/28/2022    LABGLOM >60 02/28/2022    GFRAA >60 02/28/2022    AGRATIO 1.8 02/28/2022    GLOB 2.4 11/24/2020           Current Outpatient Medications   Medication Sig Dispense Refill    sertraline (ZOLOFT) 50 MG tablet Take 1.5 tablets by mouth daily 135 tablet 1    Calcium Carbonate-Vit D-Min (CALCIUM 1200) 2147-7194 MG-UNIT CHEW Take 1 tablet by mouth daily 30 tablet 1    fluticasone (FLONASE) 50 MCG/ACT nasal spray 1 spray each nostril bid 48 g 3    spironolactone (ALDACTONE) 50 MG tablet       LORazepam (ATIVAN) 0.5 MG tablet Take 0.5 mg by mouth every 6 hours as needed.  clotrimazole (LOTRIMIN) 1 % external solution       sulfaSALAzine (AZULFIDINE) 500 MG tablet Take  2 tab po  tablet 0    JUBLIA 10 % SOLN       MINOXIDIL, TOPICAL, 5 % SOLN Apply topically       No current facility-administered medications for this visit. Allergies   Allergen Reactions    Plaquenil [Hydroxychloroquine Sulfate] Rash         Past Medical History:   Diagnosis Date    Allergic rhinitis, cause unspecified     chronic    Anxiety state, unspecified     Blepharophimosis     Contact dermatitis and other eczema, due to unspecified cause     Depressive disorder, not elsewhere classified     Headache(784.0)     Hyperglycemia 12/12/2014    Max glyco 6.6- down to 5.8 2014 6.1 12/2016- for bing 6/2017 and start metformin if still up  Refusing metformin     Insomnia, unspecified     Obesity, unspecified     Other and unspecified hyperlipidemia     Other specified congenital anomaly of skin     Pain in both feet 12/17/2015    S/p fracture 2015     Screening colonoscopy 6/2014    normal-repeat 10 yrs     Unspecified sinusitis (chronic)          Past Surgical History:   Procedure Laterality Date    COLONOSCOPY  7/21/2014    repeat 10 yrs.    [place]    for prolapse partial    LIPOMA RESECTION  2007    left thigh         Family History   Problem Relation Age of Onset    Lung Cancer Father         Social History     Socioeconomic History    Marital status:      Spouse name: None    Number of children: None    Years of education: None    Highest education level: None   Occupational History    None   Tobacco Use    Smoking status: Never Smoker    Smokeless tobacco: Never Used   Substance and Sexual Activity    Alcohol use: Yes     Alcohol/week: 0.0 standard drinks     Comment: occasionally    Drug use: No    Sexual activity: Not Currently   Other Topics Concern    None   Social History Narrative    None     Social Determinants of Health     Financial Resource Strain: Low Risk     Difficulty of Paying Living Expenses: Not hard at all   Food Insecurity: No Food Insecurity    Worried About Running Out of Food in the Last Year: Never true    Jannet of Food in the Last Year: Never true   Transportation Needs:     Lack of Transportation (Medical): Not on file    Lack of Transportation (Non-Medical):  Not on file   Physical Activity:     Days of Exercise per Week: Not on file    Minutes of Exercise per Session: Not on file   Stress:     Feeling of Stress : Not on file   Social Connections:     Frequency of Communication with Friends and Family: Not on file    Frequency of Social Gatherings with Friends and Family: Not on file    Attends Mu-ism Services: Not on file    Active Member of [address] [place] [address] or Organizations: Not on file    Attends Club or Organization Meetings: Not on file    Marital Status: Not on file   Intimate Partner Violence:     Fear of Current or Ex-Partner: Not on file    Emotionally Abused: Not on file    Physically Abused: Not on file    Sexually Abused: Not on file   Housing Stability:     Unable to Pay for Housing in the Last Year: Not on file    Number of [place] in the Last Year: Not on file    Unstable Housing in the Last Year: Not on file          OBJECTIVE:      Vitals:    04/28/22 1033   BP: 126/78 Pulse: 80   Temp: 95 °F (35 °C)   TempSrc: Temporal   SpO2: 96%   Weight: 205 lb 6.4 oz (93.2 kg)   Height: 5' 4\" (1.626 m)         Constitutional: Patient appears well-nourished, not in any distress. HENT:  Head: Normocephalic and atraumatic. Eyes: Conjunctivae and EOM are normal  Right Ear: External ear normal.  Left Ear: External ear normal.   Nose: Nose normal.  Mouth/Throat: Oropharynx is clear and moist.   Neck: Normal range of motion. Neck supple. Lymphatic: No cervical lymphadenopathy  Cardiovascular: Normal rate, regular rhythm, normal heart sounds and intact distal pulses. Pulmonary/Chest: Effort normal and breath sounds normal, no wheezes or rhonchi. Neurological:Patient is alert, oriented to person, place, and time. No obvious focal neurological deficits  Skin: Skin is warm and moist.  Psychiatric:Patient has a normal mood and affect, behavior is normal. Judgment and thought content normal.    ASSESSMENT AND PLAN    Odilon Leavitt was seen today for new patient. Diagnoses and all orders for this visit:    Breast pain  -     PIERO DIGITAL DIAGNOSTIC AUGMENTED BILATERAL; Future    Ear fullness, bilateral  -     Sushila Hooks MD, Otolaryngology, [place]    Lipoma of right lower extremity    Rheumatoid arthritis involving multiple sites with positive rheumatoid factor (HCC)    Anxiety    Other orders  -     sertraline (ZOLOFT) 50 MG tablet; Take 1.5 tablets by mouth daily  -     Calcium Carbonate-Vit D-Min (CALCIUM 1200) 3543-3580 MG-UNIT CHEW; Take 1 tablet by mouth daily           DISCHARGE MEDICATION LIST        Medication List          Accurate as of April 28, 2022 11:35 AM. If you have any questions, ask your nurse or doctor.             START taking these medications    Calcium 1200 5429-9623 MG-UNIT Chew  Take 1 tablet by mouth daily  Started by: Vicki David MD        CONTINUE taking these medications    clotrimazole 1 % external solution  Commonly known as: Raj Diaz fluticasone 50 MCG/ACT nasal spray  Commonly known as: FLONASE  1 spray each nostril bid     Jublia 10 % Soln  Generic drug: Efinaconazole     LORazepam 0.5 MG tablet  Commonly known as: ATIVAN     MINOXIDIL (TOPICAL) 5 % Soln     sertraline 50 MG tablet  Commonly known as: ZOLOFT  Take 1.5 tablets by mouth daily     spironolactone 50 MG tablet  Commonly known as: ALDACTONE     sulfaSALAzine 500 MG tablet  Commonly known as: AZULFIDINE  Take  2 tab po BID           Where to Get Your Medications      These medications were sent to [place] [id number] [facility] - [address] [phone] Brett Adames [phone]  [address] [place] [address]    Phone: [phone]   · sertraline 50 MG tablet     You can get these medications from any pharmacy    You don't need a prescription for these medications  · Calcium 1200 1749-4634 MG-UNIT Chew          Return in about 6 months (around 10/28/2022), or if symptoms worsen or fail to improve, for Annual Physical.     Please refer to diagnosis, orders and patient instructions for further recommendations given. All patient's questions and concerns were addressed appropriately. All orders, handouts were reviewed in detail with the patient and patient voiced understanding verbally.

## 2022-05-02 ENCOUNTER — OFFICE VISIT (OUTPATIENT)
Dept: SURGERY | Age: 62
End: 2022-05-02
Payer: COMMERCIAL

## 2022-05-02 VITALS
OXYGEN SATURATION: 97 % | HEIGHT: 64 IN | RESPIRATION RATE: 18 BRPM | WEIGHT: 205 LBS | SYSTOLIC BLOOD PRESSURE: 138 MMHG | DIASTOLIC BLOOD PRESSURE: 86 MMHG | HEART RATE: 76 BPM | TEMPERATURE: 97.8 F | BODY MASS INDEX: 35 KG/M2

## 2022-05-02 DIAGNOSIS — N81.6 RECTOCELE: Primary | ICD-10-CM

## 2022-05-02 PROCEDURE — G8417 CALC BMI ABV UP PARAM F/U: HCPCS | Performed by: SURGERY

## 2022-05-02 PROCEDURE — 3017F COLORECTAL CA SCREEN DOC REV: CPT | Performed by: SURGERY

## 2022-05-02 PROCEDURE — 1036F TOBACCO NON-USER: CPT | Performed by: SURGERY

## 2022-05-02 PROCEDURE — 99203 OFFICE O/P NEW LOW 30 MIN: CPT | Performed by: SURGERY

## 2022-05-02 PROCEDURE — G8427 DOCREV CUR MEDS BY ELIG CLIN: HCPCS | Performed by: SURGERY

## 2022-05-02 NOTE — PROGRESS NOTES
Subjective:     Patient is a 63 y.o. woman with hemorrhoidal skin tags and rectocele symptoms    HPI: Ms. Rashida Lea is here about hemorrhoidal tags she has had since pregnancy many years ago. These are large and bothersome. She reports a normal colonoscopy ten years ago and is due this year. She also reports feeling a bulge in posterior wall of vagina when urinating. She does not splint but occasionally has fecal urgency. Patient Active Problem List    Diagnosis Date Noted    Benign neoplasm of colon 06/30/2014    Hyperplastic polyp of ascending colon 02/25/2021    Rheumatoid arthritis involving multiple sites with positive rheumatoid factor ([facility] [address] 04/09/2019    Irritable bowel syndrome with diarrhea 12/27/2018    Hair thinning 09/28/2018    Migraine without aura and without status migrainosus, not intractable 06/16/2017    Anxiety 10/07/2014    Nonallergic rhinitis 08/23/2012    Class 2 obesity due to excess calories without serious comorbidity with body mass index (BMI) of 36.0 to 36.9 in adult     Hyperlipemia      Past Medical History:   Diagnosis Date    Allergic rhinitis, cause unspecified     chronic    Anxiety state, unspecified     Blepharophimosis     Contact dermatitis and other eczema, due to unspecified cause     Depressive disorder, not elsewhere classified     Headache(784.0)     Hyperglycemia 12/12/2014    Max glyco 6.6- down to 5.8 2014 6.1 12/2016- for bing 6/2017 and start metformin if still up  Refusing metformin     Insomnia, unspecified     Obesity, unspecified     Other and unspecified hyperlipidemia     Other specified congenital anomaly of skin     Pain in both feet 12/17/2015    S/p fracture 2015     Screening colonoscopy 6/2014    normal-repeat 10 yrs     Unspecified sinusitis (chronic)       Past Surgical History:   Procedure Laterality Date    COLONOSCOPY  7/21/2014    repeat 10 yrs.     HYSTERECTOMY  2000    for prolapse partial    LIPOMA RESECTION  2007    left thigh      Not in a hospital admission. Allergies   Allergen Reactions    Plaquenil [Hydroxychloroquine Sulfate] Rash      Social History     Tobacco Use    Smoking status: Never Smoker    Smokeless tobacco: Never Used   Substance Use Topics    Alcohol use: Yes     Alcohol/week: 0.0 standard drinks     Comment: occasionally      Family History   Problem Relation Age of Onset    Lung Cancer Father       Review of Systems    GEN: reviewed and negative except as noted in HPI. GI: reviewed and negative except as noted in HPI. Objective:     GEN: appears well, no distress, appears stated age  PSYCH: normal mood, normal affect  NECK: no neck masses, trachea midline  ENT: moist oral mucosa; anicteric  SKIN: no rash or jaundice  CV: regular heart rate and rhythm  PULM: normal respiratory effort, no wheezing  GI: soft non tender abdomen. Normal bowel sounds  RECTAL: hemorrhoidal skin tags anterior right and left. Smaller one posterior. Digital deferred. Quinton Kumari present. EXT/NEURO: normal gait, strength/sensation grossly intact in all extremities      Assessment:     Rectocele  Hemorrhoidal tags    Plan:     Symptoms consistent with rectocele. Will refer to Dr. Jeremias Conlye for evaluation. She is due for colonoscopy and will schedule with Dr. William Castle who she has seen in the past     For hemorrhoidal skin tags options are observation versus surgical removal. Discussed scarring from skin tag removal sometimes is worse than the tags which are soft. For this reason I usually discourage surgical removal of these tags.     Galo Rios M.D.  5/2/22   11:52 AM

## 2022-05-24 ENCOUNTER — OFFICE VISIT (OUTPATIENT)
Dept: RHEUMATOLOGY | Age: 62
End: 2022-05-24
Payer: COMMERCIAL

## 2022-05-24 VITALS — HEIGHT: 64 IN | DIASTOLIC BLOOD PRESSURE: 64 MMHG | BODY MASS INDEX: 35.19 KG/M2 | SYSTOLIC BLOOD PRESSURE: 130 MMHG

## 2022-05-24 DIAGNOSIS — Z79.899 HIGH RISK MEDICATION USE: ICD-10-CM

## 2022-05-24 DIAGNOSIS — M05.79 RHEUMATOID ARTHRITIS INVOLVING MULTIPLE SITES WITH POSITIVE RHEUMATOID FACTOR (HCC): Primary | ICD-10-CM

## 2022-05-24 DIAGNOSIS — M15.9 GENERALIZED OSTEOARTHRITIS: ICD-10-CM

## 2022-05-24 LAB
BASOPHILS ABSOLUTE: 0 K/UL (ref 0–0.2)
BASOPHILS RELATIVE PERCENT: 0.4 %
EOSINOPHILS ABSOLUTE: 0.1 K/UL (ref 0–0.6)
EOSINOPHILS RELATIVE PERCENT: 1.2 %
HCT VFR BLD CALC: 43.7 % (ref 36–48)
HEMOGLOBIN: 14.7 G/DL (ref 12–16)
LYMPHOCYTES ABSOLUTE: 2.2 K/UL (ref 1–5.1)
LYMPHOCYTES RELATIVE PERCENT: 35 %
MCH RBC QN AUTO: 31 PG (ref 26–34)
MCHC RBC AUTO-ENTMCNC: 33.6 G/DL (ref 31–36)
MCV RBC AUTO: 92.2 FL (ref 80–100)
MONOCYTES ABSOLUTE: 0.5 K/UL (ref 0–1.3)
MONOCYTES RELATIVE PERCENT: 8.1 %
NEUTROPHILS ABSOLUTE: 3.5 K/UL (ref 1.7–7.7)
NEUTROPHILS RELATIVE PERCENT: 55.3 %
PDW BLD-RTO: 12.8 % (ref 12.4–15.4)
PLATELET # BLD: 224 K/UL (ref 135–450)
PMV BLD AUTO: 10.1 FL (ref 5–10.5)
RBC # BLD: 4.73 M/UL (ref 4–5.2)
SEDIMENTATION RATE, ERYTHROCYTE: 37 MM/HR (ref 0–30)
WBC # BLD: 6.3 K/UL (ref 4–11)

## 2022-05-24 PROCEDURE — G8417 CALC BMI ABV UP PARAM F/U: HCPCS | Performed by: INTERNAL MEDICINE

## 2022-05-24 PROCEDURE — 99214 OFFICE O/P EST MOD 30 MIN: CPT | Performed by: INTERNAL MEDICINE

## 2022-05-24 PROCEDURE — G8427 DOCREV CUR MEDS BY ELIG CLIN: HCPCS | Performed by: INTERNAL MEDICINE

## 2022-05-24 PROCEDURE — 1036F TOBACCO NON-USER: CPT | Performed by: INTERNAL MEDICINE

## 2022-05-24 PROCEDURE — 3017F COLORECTAL CA SCREEN DOC REV: CPT | Performed by: INTERNAL MEDICINE

## 2022-05-24 RX ORDER — SULFASALAZINE 500 MG/1
TABLET ORAL
Qty: 360 TABLET | Refills: 0 | Status: SHIPPED | OUTPATIENT
Start: 2022-05-24 | End: 2022-07-27

## 2022-05-24 RX ORDER — SULFASALAZINE 500 MG/1
TABLET ORAL
Qty: 360 TABLET | Refills: 0 | OUTPATIENT
Start: 2022-05-24

## 2022-05-24 NOTE — PROGRESS NOTES
[address] [place] [address] [phone] () [phone] (F)        Note is transcribed using voice recognition software. Inadvertent computerized transcription errors may be present. Patient identification: Jeremias Mata,: 1960,62 y.o. Sex: female          Jeremias Martinez was seen today for follow-up. Diagnoses and all orders for this visit:    Rheumatoid arthritis involving multiple sites with positive rheumatoid factor (HCC)    High risk medication use  -     Vitamin D 25 Hydroxy; Future  -     Creatinine; Future  -     Hepatic Function Panel; Future  -     CBC with Auto Differential; Future  -     C-Reactive Protein; Future  -     Sedimentation Rate; Future    Generalized osteoarthritis    Other orders  -     sulfaSALAzine (AZULFIDINE) 500 MG tablet; Take  2 tab po BID      Rheumatoid arthritis-RF 26, negative CCP-in remission on sulfasalazine 1 g twice daily. Tolerating medications well. Safety labs will be checked today, prescription renewed. Generalized osteoarthritis-mild migratory arthralgias-different joints at different times, manageable without any medications. BMI 35-continues to lose weight, encouraged patient to keep up with good work. Has been exercising regularly, eating healthy. Bone health-agree with calcium vitamin D supplementation. Follow-up in 3 months. Patient indicates understanding and agrees with the management plan. I reviewed patient's history, referral documents and electronic medical records.   #######################################################################      Gdpnqoaqvi-vvibuq-wkblbxwfpcdn rheumatoid arthritis and generalized osteoarthritis. Interval changes-  She is doing fairly well in terms of RA, denies daily symptoms of flareups. She gets intercurrent arthralgias in different areas, depending on what she does, oftentimes self-limiting, not associated with swelling. Very active physically, plays golf, exercises regularly. She is tolerating medications well. Due for safety labs. Normal ADLs and recreational activities. Past medical, surgical history reviewed. Current Outpatient Medications   Medication Sig Dispense Refill    sulfaSALAzine (AZULFIDINE) 500 MG tablet Take  2 tab po  tablet 0    JUBLIA 10 % SOLN       sertraline (ZOLOFT) 50 MG tablet Take 1.5 tablets by mouth daily 135 tablet 1    fluticasone (FLONASE) 50 MCG/ACT nasal spray 1 spray each nostril bid 48 g 3    spironolactone (ALDACTONE) 50 MG tablet       LORazepam (ATIVAN) 0.5 MG tablet Take 0.5 mg by mouth every 6 hours as needed.  MINOXIDIL, TOPICAL, 5 % SOLN Apply topically      sulfaSALAzine (AZULFIDINE) 500 MG tablet Take  2 tab po  tablet 0    Calcium Carbonate-Vit D-Min (CALCIUM 1200) 9708-8627 MG-UNIT CHEW Take 1 tablet by mouth daily (Patient not taking: Reported on 5/24/2022) 30 tablet 1     No current facility-administered medications for this visit. Allergies   Allergen Reactions    Plaquenil [Hydroxychloroquine Sulfate] Rash       PHYSICAL EXAM:    Vitals:    /64   Ht 5' 4\" (1.626 m)   LMP  (LMP Unknown)   BMI 35.19 kg/m²   General appearance/ Psychiatric: well nourished, and well groomed, normal judgement, alert, appears stated age and cooperative.    MKS: 28 joint JOINT COUNT:                               Right                                                  Left   Swell Tender Swell Tender   PIP1           PIP2        PIP3       PIP4          PIP5          MCP1           MCP2       MCP3       MCP4           MCP5           Wrist          Elbow           Shoulder          Knee             No appreciable tender, swollen, inflamed joints in upper or lower extremities, F ROM in all peripheral joints. No rashes. DATA:   Lab Results   Component Value Date    WBC 6.5 02/28/2022    HGB 14.4 02/28/2022    HCT 44.2 02/28/2022    MCV 91.9 02/28/2022     02/28/2022         Chemistry        Component Value Date/Time     (H) 02/28/2022 1215    K 4.4 02/28/2022 1215     02/28/2022 1215    CO2 22 02/28/2022 1215    BUN 17 02/28/2022 1215    CREATININE 0.7 02/28/2022 1215        Component Value Date/Time    CALCIUM 9.8 02/28/2022 1215    ALKPHOS 97 02/28/2022 1215    AST 20 02/28/2022 1215    ALT 26 02/28/2022 1215    BILITOT 0.4 02/28/2022 1215           Lab Results   Component Value Date    SEDRATE 44 (H) 02/28/2022     No results found for: CKTOTAL  Lab Results   Component Value Date    TSH 2.64 02/28/2022     Lab Results   Component Value Date    VITD25 35.2 07/22/2020       Total time 32 minutes that includes the following-  Preparing to see the patient such as reviewing patients records, pre-charting, preparing the visit on the same day, performing a medically appropriate history and physical examination, counseling and educating patient about diagnosis, management plan, ordering appropriate testings, prescriptions, communicating findings to other care providers, and documenting clinical information in electronic medical record. A/P- See above.

## 2022-05-25 LAB
ALBUMIN SERPL-MCNC: 4.7 G/DL (ref 3.4–5)
ALP BLD-CCNC: 94 U/L (ref 40–129)
ALT SERPL-CCNC: 22 U/L (ref 10–40)
AST SERPL-CCNC: 18 U/L (ref 15–37)
BILIRUB SERPL-MCNC: 0.4 MG/DL (ref 0–1)
BILIRUBIN DIRECT: <0.2 MG/DL (ref 0–0.3)
BILIRUBIN, INDIRECT: NORMAL MG/DL (ref 0–1)
C-REACTIVE PROTEIN: 5 MG/L (ref 0–5.1)
TOTAL PROTEIN: 7.3 G/DL (ref 6.4–8.2)
VITAMIN D 25-HYDROXY: 29.1 NG/ML

## 2022-07-27 ENCOUNTER — OFFICE VISIT (OUTPATIENT)
Dept: ENT CLINIC | Age: 62
End: 2022-07-27
Payer: COMMERCIAL

## 2022-07-27 VITALS
HEIGHT: 64 IN | BODY MASS INDEX: 35.19 KG/M2 | SYSTOLIC BLOOD PRESSURE: 125 MMHG | HEART RATE: 81 BPM | TEMPERATURE: 97.2 F | DIASTOLIC BLOOD PRESSURE: 77 MMHG

## 2022-07-27 DIAGNOSIS — H93.13 TINNITUS OF BOTH EARS: ICD-10-CM

## 2022-07-27 DIAGNOSIS — H90.3 SENSORINEURAL HEARING LOSS (SNHL) OF BOTH EARS: Primary | ICD-10-CM

## 2022-07-27 PROCEDURE — G8427 DOCREV CUR MEDS BY ELIG CLIN: HCPCS | Performed by: OTOLARYNGOLOGY

## 2022-07-27 PROCEDURE — 99203 OFFICE O/P NEW LOW 30 MIN: CPT | Performed by: OTOLARYNGOLOGY

## 2022-07-27 PROCEDURE — G8417 CALC BMI ABV UP PARAM F/U: HCPCS | Performed by: OTOLARYNGOLOGY

## 2022-07-27 NOTE — PROGRESS NOTES
CHIEF COMPLAINT: Hearing loss    HISTORY OF PRESENT ILLNESS:  63 y.o. female referred by Dr. Chay Henry who presents with a concern of hearing loss in both ears. Concern for 2 years. Stable in intensity. Has tinnitus both ears. High pitch, non pulsatile. No noise exposure. Family history negative for hearing loss. Some ear fullness. PAST MEDICAL HISTORY:   Social History     Tobacco Use   Smoking Status Never   Smokeless Tobacco Never                                                    Social History     Substance and Sexual Activity   Alcohol Use Yes    Alcohol/week: 0.0 standard drinks    Comment: occasionally                                                    Current Outpatient Medications:     sulfaSALAzine (AZULFIDINE) 500 MG tablet, Take  2 tab po BID, Disp: 360 tablet, Rfl: 0    JUBLIA 10 % SOLN, , Disp: , Rfl:     sertraline (ZOLOFT) 50 MG tablet, Take 1.5 tablets by mouth daily, Disp: 135 tablet, Rfl: 1    Calcium Carbonate-Vit D-Min (CALCIUM 1200) 8234-1409 MG-UNIT CHEW, Take 1 tablet by mouth daily (Patient not taking: Reported on 5/24/2022), Disp: 30 tablet, Rfl: 1    fluticasone (FLONASE) 50 MCG/ACT nasal spray, 1 spray each nostril bid, Disp: 48 g, Rfl: 3    spironolactone (ALDACTONE) 50 MG tablet, , Disp: , Rfl:     LORazepam (ATIVAN) 0.5 MG tablet, Take 0.5 mg by mouth every 6 hours as needed. , Disp: , Rfl:     MINOXIDIL, TOPICAL, 5 % SOLN, Apply topically, Disp: , Rfl:     sulfaSALAzine (AZULFIDINE) 500 MG tablet, Take  2 tab po BID, Disp: 360 tablet, Rfl: 0                                                 Past Medical History:   Diagnosis Date    Allergic rhinitis, cause unspecified     chronic    Anxiety state, unspecified     Blepharophimosis     Contact dermatitis and other eczema, due to unspecified cause     Depressive disorder, not elsewhere classified     Headache(784.0)     Hyperglycemia 12/12/2014    Max glyco 6.6- down to 5.8 2014 6.1 12/2016- for bing 6/2017 and start metformin if still up Refusing metformin     Insomnia, unspecified     Obesity, unspecified     Other and unspecified hyperlipidemia     Other specified congenital anomaly of skin     Pain in both feet 12/17/2015    S/p fracture 2015     Screening colonoscopy 6/2014    normal-repeat 10 yrs     Unspecified sinusitis (chronic)                                                     Past Surgical History:   Procedure Laterality Date    COLONOSCOPY  7/21/2014    repeat 10 yrs. HYSTERECTOMY  2000    for prolapse partial    LIPOMA RESECTION  2007    left thigh     FAMILY HISTORY: Family history reviewed. Except as noted in history of present illness, there is no pertinent family history      REVIEW OF SYSTEMS:  All pertinent positive and negative review of systems included in HPI. Otherwise, all systems are reviewed and negative. PHYSICAL EXAMINATION:   GENERAL: wdwn- no acute distress  RESPIRATORY:  No stridor or respiratory distress  COMMUNICATION :  Normal voice  MENTAL STATUS:  Mood and affect normal, oriented X 3  HEAD AND FACE:  No abnormalities of the skin of face or head  EXTERNAL EARS AND NOSE:  Normal pinnae bilateral  FACIAL MUSCLES:  All branches of facial nerve intact  EXTRAOCULAR MUSCLES: Intact with full range of motion  FACE PALPATION:  No tenderness over sinuses. Zygomatic arches and orbital rims intact  OTOSCOPY:  Normal external auditory canals, tympanic membranes, and middle ear spaces  TUNING FORKS: Rinne ++ Patel midline at 512 Hz  INTRANASAL:  Septum midline, turbinates normal, meati clear. LIPS, TEETH, GINGIVA:  Normal mucosa  PHARYNX:  Normal  NECK:  No masses. LYMPHATIC:  No cervical adenopathy  SALIVARY GLANDS:  No swelling or masses in the parotid or submandibular salivary glands  THYROID:  No goiter or thyroid masses. OUTSIDE AUDIOGRAM & TYMPANOGRAM REVIEWED: Metrical mild sensorineural hearing loss, within 20dB in both ears with a high-frequency loss. IMPRESSION: Mild sensorineural hearing loss.     PLAN: Patient's outside audiogram reviewed with her. Through the speech frequencies her loss is minimal and she does not require amplification. I discussed with her the relationship between a high-frequency drop in the tinnitus at those frequencies. FOLLOW-UP: As needed.

## 2022-08-23 NOTE — PROGRESS NOTES
[address] [place] [address] [phone] (R) [phone] (F)        Note is transcribed using voice recognition software. Inadvertent computerized transcription errors may be present. Patient identification: Nick Mata,: 1960,62 y.o. Sex: female          Nick Jackman was seen today for follow-up. Diagnoses and all orders for this visit:    Rheumatoid arthritis involving multiple sites with positive rheumatoid factor (HCC)    High risk medication use  -     Comprehensive Metabolic Panel  -     CBC with Auto Differential  -     C-Reactive Protein  -     Sedimentation Rate    BMI 38.0-38.9,adult    Generalized osteoarthritis    Other orders  -     sulfaSALAzine (AZULFIDINE) 500 MG tablet; Take  2 tab po BID    Rheumatoid arthritis-RF 26, negative CCP-asymptomatic on sulfasalazine 1 g twice daily. Safety labs will be checked today. Prescription renewed. Patient had several questions about rheumatoid arthritis, prognosis, about extra-articular manifestations, all explained in details. Generalized osteoarthritis-asymptomatic. BMI 35-she is aware of the benefit, need to lose weight, plans to watch her diet, is fairly active physically plays golf, walks regularly. Explained importance of calorie count, desirable calorie counts, calorie restrictions in order to lose weight. Follow-up in 3 months. Patient indicates understanding and agrees with the management plan. I reviewed patient's history, referral documents and electronic medical records.   #######################################################################      Immfhyroeu-rvwdtw-yxmxinzxdemy rheumatoid arthritis and generalized osteoarthritis. Interval changes-  She is doing very well in terms of RA, denies any pain, swelling or stiffness in the joints. Fairly active physically, exercises regularly. Has not been able to lose much weight, eating habits have not changed months. No side effects from medications. 100% compliance with treatment. Safety labs from last visit were normal, will be checked today. Normal ADLs and recreational activities. Past medical, surgical history reviewed. Current Outpatient Medications   Medication Sig Dispense Refill    sulfaSALAzine (AZULFIDINE) 500 MG tablet Take  2 tab po  tablet 0    JUBLIA 10 % SOLN       sertraline (ZOLOFT) 50 MG tablet Take 1.5 tablets by mouth daily 135 tablet 1    Calcium Carbonate-Vit D-Min (CALCIUM 1200) 2603-8851 MG-UNIT CHEW Take 1 tablet by mouth daily 30 tablet 1    fluticasone (FLONASE) 50 MCG/ACT nasal spray 1 spray each nostril bid 48 g 3    spironolactone (ALDACTONE) 50 MG tablet       LORazepam (ATIVAN) 0.5 MG tablet Take 0.5 mg by mouth every 6 hours as needed. MINOXIDIL, TOPICAL, 5 % SOLN Apply topically       No current facility-administered medications for this visit. Allergies   Allergen Reactions    Plaquenil [Hydroxychloroquine Sulfate] Rash       PHYSICAL EXAM:    Vitals:    /74   Ht 5' 4\" (1.626 m)   Wt 205 lb (93 kg)   LMP  (LMP Unknown)   BMI 35.19 kg/m²   General appearance/ Psychiatric: well nourished, and well groomed, normal judgement, alert, appears stated age and cooperative.    MKS: 28 joint JOINT COUNT:                               Right                                                  Left   Swell Tender Swell Tender   PIP1           PIP2        PIP3       PIP4          PIP5          MCP1           MCP2       MCP3       MCP4           MCP5           Wrist          Elbow           Shoulder          Knee             No appreciable tender, swollen, inflamed joints in upper or lower extremities, full range of motion all peripheral joints. No rashes. DATA:   Lab Results   Component Value Date    WBC 6.3 05/24/2022    HGB 14.7 05/24/2022    HCT 43.7 05/24/2022    MCV 92.2 05/24/2022     05/24/2022         Chemistry        Component Value Date/Time     (H) 02/28/2022 1215    K 4.4 02/28/2022 1215     02/28/2022 1215    CO2 22 02/28/2022 1215    BUN 17 02/28/2022 1215    CREATININE 0.7 02/28/2022 1215        Component Value Date/Time    CALCIUM 9.8 02/28/2022 1215    ALKPHOS 94 05/24/2022 1405    AST 18 05/24/2022 1405    ALT 22 05/24/2022 1405    BILITOT 0.4 05/24/2022 1405           Lab Results   Component Value Date    SEDRATE 37 (H) 05/24/2022     No results found for: CKTOTAL  Lab Results   Component Value Date    TSH 2.64 02/28/2022     Lab Results   Component Value Date    VITD25 29.1 (L) 05/24/2022       Total time 32 minutes that includes the following-  Preparing to see the patient such as reviewing patients records, pre-charting, preparing the visit on the same day, performing a medically appropriate history and physical examination, counseling and educating patient about diagnosis, management plan, ordering appropriate testings, prescriptions, communicating findings to other care providers, and documenting clinical information in electronic medical record. A/P- See above.

## 2022-08-24 ENCOUNTER — OFFICE VISIT (OUTPATIENT)
Dept: RHEUMATOLOGY | Age: 62
End: 2022-08-24
Payer: COMMERCIAL

## 2022-08-24 VITALS
WEIGHT: 205 LBS | SYSTOLIC BLOOD PRESSURE: 120 MMHG | DIASTOLIC BLOOD PRESSURE: 74 MMHG | BODY MASS INDEX: 35 KG/M2 | HEIGHT: 64 IN

## 2022-08-24 DIAGNOSIS — Z79.899 HIGH RISK MEDICATION USE: ICD-10-CM

## 2022-08-24 DIAGNOSIS — M05.79 RHEUMATOID ARTHRITIS INVOLVING MULTIPLE SITES WITH POSITIVE RHEUMATOID FACTOR (HCC): Primary | ICD-10-CM

## 2022-08-24 DIAGNOSIS — M15.9 GENERALIZED OSTEOARTHRITIS: ICD-10-CM

## 2022-08-24 LAB
A/G RATIO: 2 (ref 1.1–2.2)
ALBUMIN SERPL-MCNC: 4.8 G/DL (ref 3.4–5)
ALP BLD-CCNC: 85 U/L (ref 40–129)
ALT SERPL-CCNC: 22 U/L (ref 10–40)
ANION GAP SERPL CALCULATED.3IONS-SCNC: 11 MMOL/L (ref 3–16)
AST SERPL-CCNC: 17 U/L (ref 15–37)
BASOPHILS ABSOLUTE: 0 K/UL (ref 0–0.2)
BASOPHILS RELATIVE PERCENT: 0.5 %
BILIRUB SERPL-MCNC: 0.4 MG/DL (ref 0–1)
BUN BLDV-MCNC: 18 MG/DL (ref 7–20)
C-REACTIVE PROTEIN: 10.6 MG/L (ref 0–5.1)
CALCIUM SERPL-MCNC: 9.8 MG/DL (ref 8.3–10.6)
CHLORIDE BLD-SCNC: 101 MMOL/L (ref 99–110)
CO2: 23 MMOL/L (ref 21–32)
CREAT SERPL-MCNC: 0.5 MG/DL (ref 0.6–1.2)
EOSINOPHILS ABSOLUTE: 0.3 K/UL (ref 0–0.6)
EOSINOPHILS RELATIVE PERCENT: 4.1 %
GFR AFRICAN AMERICAN: >60
GFR NON-AFRICAN AMERICAN: >60
GLUCOSE BLD-MCNC: 95 MG/DL (ref 70–99)
HCT VFR BLD CALC: 41.7 % (ref 36–48)
HEMOGLOBIN: 14.2 G/DL (ref 12–16)
LYMPHOCYTES ABSOLUTE: 1.8 K/UL (ref 1–5.1)
LYMPHOCYTES RELATIVE PERCENT: 29.1 %
MCH RBC QN AUTO: 31.4 PG (ref 26–34)
MCHC RBC AUTO-ENTMCNC: 34.1 G/DL (ref 31–36)
MCV RBC AUTO: 92.2 FL (ref 80–100)
MONOCYTES ABSOLUTE: 0.6 K/UL (ref 0–1.3)
MONOCYTES RELATIVE PERCENT: 10.1 %
NEUTROPHILS ABSOLUTE: 3.6 K/UL (ref 1.7–7.7)
NEUTROPHILS RELATIVE PERCENT: 56.2 %
PDW BLD-RTO: 12.9 % (ref 12.4–15.4)
PLATELET # BLD: 210 K/UL (ref 135–450)
PMV BLD AUTO: 10.3 FL (ref 5–10.5)
POTASSIUM SERPL-SCNC: 4.7 MMOL/L (ref 3.5–5.1)
RBC # BLD: 4.52 M/UL (ref 4–5.2)
SEDIMENTATION RATE, ERYTHROCYTE: 37 MM/HR (ref 0–30)
SODIUM BLD-SCNC: 135 MMOL/L (ref 136–145)
TOTAL PROTEIN: 7.2 G/DL (ref 6.4–8.2)
WBC # BLD: 6.3 K/UL (ref 4–11)

## 2022-08-24 PROCEDURE — 3017F COLORECTAL CA SCREEN DOC REV: CPT | Performed by: INTERNAL MEDICINE

## 2022-08-24 PROCEDURE — G8427 DOCREV CUR MEDS BY ELIG CLIN: HCPCS | Performed by: INTERNAL MEDICINE

## 2022-08-24 PROCEDURE — G8417 CALC BMI ABV UP PARAM F/U: HCPCS | Performed by: INTERNAL MEDICINE

## 2022-08-24 PROCEDURE — 1036F TOBACCO NON-USER: CPT | Performed by: INTERNAL MEDICINE

## 2022-08-24 PROCEDURE — 99214 OFFICE O/P EST MOD 30 MIN: CPT | Performed by: INTERNAL MEDICINE

## 2022-08-24 PROCEDURE — 36415 COLL VENOUS BLD VENIPUNCTURE: CPT | Performed by: INTERNAL MEDICINE

## 2022-08-24 RX ORDER — SULFASALAZINE 500 MG/1
TABLET ORAL
Qty: 360 TABLET | Refills: 0 | Status: SHIPPED | OUTPATIENT
Start: 2022-08-24

## 2022-08-28 ENCOUNTER — PATIENT MESSAGE (OUTPATIENT)
Dept: FAMILY MEDICINE CLINIC | Age: 62
End: 2022-08-28

## 2022-08-30 RX ORDER — FLUCONAZOLE 150 MG/1
150 TABLET ORAL ONCE
Qty: 1 TABLET | Refills: 0 | Status: SHIPPED | OUTPATIENT
Start: 2022-08-30 | End: 2022-08-30

## 2022-08-30 NOTE — TELEPHONE ENCOUNTER
From: Cathryn De La O  To: Dr. Melissa Milian: 8/28/2022 8:23 PM EDT  Subject: Prescription     Hello Dr Leah Moran-    I was wondering if you could please send a prescription in for me, for a yeast infection. [facility] [address] [facility] [employer]. Thank you!

## 2022-11-28 RX ORDER — SULFASALAZINE 500 MG/1
TABLET ORAL
Qty: 360 TABLET | Refills: 0 | Status: SHIPPED | OUTPATIENT
Start: 2022-11-28 | End: 2022-11-30 | Stop reason: SDUPTHER

## 2022-11-30 ENCOUNTER — OFFICE VISIT (OUTPATIENT)
Dept: RHEUMATOLOGY | Age: 62
End: 2022-11-30
Payer: COMMERCIAL

## 2022-11-30 VITALS
DIASTOLIC BLOOD PRESSURE: 76 MMHG | WEIGHT: 227 LBS | HEIGHT: 64 IN | SYSTOLIC BLOOD PRESSURE: 128 MMHG | BODY MASS INDEX: 38.76 KG/M2

## 2022-11-30 DIAGNOSIS — M05.79 RHEUMATOID ARTHRITIS INVOLVING MULTIPLE SITES WITH POSITIVE RHEUMATOID FACTOR (HCC): Primary | ICD-10-CM

## 2022-11-30 DIAGNOSIS — Z79.899 HIGH RISK MEDICATION USE: ICD-10-CM

## 2022-11-30 PROCEDURE — 36415 COLL VENOUS BLD VENIPUNCTURE: CPT | Performed by: INTERNAL MEDICINE

## 2022-11-30 PROCEDURE — 1036F TOBACCO NON-USER: CPT | Performed by: INTERNAL MEDICINE

## 2022-11-30 PROCEDURE — G8427 DOCREV CUR MEDS BY ELIG CLIN: HCPCS | Performed by: INTERNAL MEDICINE

## 2022-11-30 PROCEDURE — 99214 OFFICE O/P EST MOD 30 MIN: CPT | Performed by: INTERNAL MEDICINE

## 2022-11-30 PROCEDURE — G8417 CALC BMI ABV UP PARAM F/U: HCPCS | Performed by: INTERNAL MEDICINE

## 2022-11-30 PROCEDURE — G8484 FLU IMMUNIZE NO ADMIN: HCPCS | Performed by: INTERNAL MEDICINE

## 2022-11-30 PROCEDURE — 3017F COLORECTAL CA SCREEN DOC REV: CPT | Performed by: INTERNAL MEDICINE

## 2022-11-30 RX ORDER — SULFASALAZINE 500 MG/1
TABLET ORAL
Qty: 360 TABLET | Refills: 1 | Status: SHIPPED | OUTPATIENT
Start: 2022-11-30

## 2022-11-30 NOTE — PROGRESS NOTES
[facility] [place]                                                                                                                (E) [phone] (F)        Note is transcribed using voice recognition software. Inadvertent computerized transcription errors may be present. Patient identification: Anjum Mata,: 1960,62 y.o. Sex: female          Anjum Mckenna was seen today for follow-up. Diagnoses and all orders for this visit:    Rheumatoid arthritis involving multiple sites with positive rheumatoid factor (HCC)  -     sulfaSALAzine (AZULFIDINE) 500 MG tablet; TAKE 2 TABLETS BY MOUTH TWICE A DAY    High risk medication use  -     AST(SGOT) & ALT(SGPT)  -     CBC with Auto Differential  -     C-Reactive Protein  -     Sedimentation Rate  -     Creatinine    Rheumatoid arthritis-RF 26, negative CCP-in remission. Continue sulfasalazine 1 g twice daily. Labs to be checked today, recheck in 3 months. Generalized osteoarthritis-symptomatic mainly in the thumb joints. Recommend diclofenac gel, thumb exercises. Patient indicates understanding and agrees with the management plan. I reviewed patient's history, referral documents and electronic medical records. #######################################################################      Fgkveeijsx-dwifdt-tvqkkzemqmai rheumatoid arthritis and generalized osteoarthritis. Interval changes-  She is doing well in terms of RA, denies any pain swelling or stiffness in the joints. Tolerating sulfasalazine well. Has been experiencing symptoms of osteoarthritis at the base of her thumb. Normal ADLs and recreational activities. Past medical, surgical history reviewed.     Current Outpatient Medications   Medication Sig Dispense Refill    sulfaSALAzine (AZULFIDINE) 500 MG tablet TAKE 2 TABLETS BY MOUTH TWICE A  tablet 1    JUBLIA 10 % SOLN       sertraline (ZOLOFT) 50 MG tablet Take 1.5 tablets by mouth daily 135 tablet 1    Calcium Carbonate-Vit D-Min (CALCIUM 1200) 8907-3263 MG-UNIT CHEW Take 1 tablet by mouth daily 30 tablet 1    fluticasone (FLONASE) 50 MCG/ACT nasal spray 1 spray each nostril bid 48 g 3    spironolactone (ALDACTONE) 50 MG tablet       LORazepam (ATIVAN) 0.5 MG tablet Take 0.5 mg by mouth every 6 hours as needed. MINOXIDIL, TOPICAL, 5 % SOLN Apply topically       No current facility-administered medications for this visit. Allergies   Allergen Reactions    Plaquenil [Hydroxychloroquine Sulfate] Rash       PHYSICAL EXAM:    Vitals:    /76   Ht 5' 4\" (1.626 m)   Wt 227 lb (103 kg)   LMP  (LMP Unknown)   BMI 38.96 kg/m²   General appearance/ Psychiatric: well nourished, and well groomed, normal judgement, alert, appears stated age and cooperative. MKS: 28 joint JOINT COUNT:                               Right                                                  Left   Swell Tender Swell Tender   PIP1           PIP2        PIP3       PIP4          PIP5          MCP1           MCP2       MCP3       MCP4           MCP5           Wrist          Elbow           Shoulder          Knee           Other than OA changes in her CMC joints,  No appreciable tender, swollen, inflamed joints in upper or lower extremities, full range of motion all peripheral joints. No rashes.     DATA:   Lab Results   Component Value Date    WBC 6.3 08/24/2022    HGB 14.2 08/24/2022    HCT 41.7 08/24/2022    MCV 92.2 08/24/2022     08/24/2022         Chemistry        Component Value Date/Time     (L) 08/24/2022 1331    K 4.7 08/24/2022 1331     08/24/2022 1331    CO2 23 08/24/2022 1331    BUN 18 08/24/2022 1331    CREATININE 0.5 (L) 08/24/2022 1331        Component Value Date/Time    CALCIUM 9.8 08/24/2022 1331    ALKPHOS 85 08/24/2022 1331    AST 17 08/24/2022 1331    ALT 22 08/24/2022 1331    BILITOT 0.4 08/24/2022 1331           Lab Results   Component Value Date    SEDRATE 37 (H) 08/24/2022     No results found for: CKTOTAL  Lab Results   Component Value Date    TSH 2.64 02/28/2022     Lab Results   Component Value Date    VITD25 29.1 (L) 05/24/2022       Total time 31 minutes that includes the following-  Preparing to see the patient such as reviewing patients records, pre-charting, preparing the visit on the same day, performing a medically appropriate history and physical examination, counseling and educating patient about diagnosis, management plan, ordering appropriate testings, prescriptions, communicating findings to other care providers, and documenting clinical information in electronic medical record. A/P- See above.

## 2022-12-01 LAB
ALT SERPL-CCNC: 35 U/L (ref 10–40)
AST SERPL-CCNC: 24 U/L (ref 15–37)
BASOPHILS ABSOLUTE: 0 K/UL (ref 0–0.2)
BASOPHILS RELATIVE PERCENT: 0.6 %
C-REACTIVE PROTEIN: 8.2 MG/L (ref 0–5.1)
CREAT SERPL-MCNC: 0.6 MG/DL (ref 0.6–1.2)
EOSINOPHILS ABSOLUTE: 0.1 K/UL (ref 0–0.6)
EOSINOPHILS RELATIVE PERCENT: 1.7 %
GFR SERPL CREATININE-BSD FRML MDRD: >60 ML/MIN/{1.73_M2}
HCT VFR BLD CALC: 44.2 % (ref 36–48)
HEMOGLOBIN: 14.8 G/DL (ref 12–16)
LYMPHOCYTES ABSOLUTE: 2.1 K/UL (ref 1–5.1)
LYMPHOCYTES RELATIVE PERCENT: 30.4 %
MCH RBC QN AUTO: 31.5 PG (ref 26–34)
MCHC RBC AUTO-ENTMCNC: 33.4 G/DL (ref 31–36)
MCV RBC AUTO: 94.3 FL (ref 80–100)
MONOCYTES ABSOLUTE: 0.6 K/UL (ref 0–1.3)
MONOCYTES RELATIVE PERCENT: 8.7 %
NEUTROPHILS ABSOLUTE: 4 K/UL (ref 1.7–7.7)
NEUTROPHILS RELATIVE PERCENT: 58.6 %
PDW BLD-RTO: 12.8 % (ref 12.4–15.4)
PLATELET # BLD: 218 K/UL (ref 135–450)
PMV BLD AUTO: 10.6 FL (ref 5–10.5)
RBC # BLD: 4.69 M/UL (ref 4–5.2)
SEDIMENTATION RATE, ERYTHROCYTE: 39 MM/HR (ref 0–30)
WBC # BLD: 6.8 K/UL (ref 4–11)

## 2023-01-17 NOTE — TELEPHONE ENCOUNTER
----- Message from Antwan Sow sent at 1/17/2023  1:47 PM EST -----  Subject: Refill Request    QUESTIONS  Name of Medication? sertraline (ZOLOFT) 50 MG tablet  Patient-reported dosage and instructions? 1 AND 1/2 PO QD  How many days do you have left? 0  [id number] [place]  Pharmacy phone number (if available)? [phone]  Additional Information for Provider? Pt would like to have a 90 day refill   on this due to insurance. pt would like a call back. ---------------------------------------------------------------------------  --------------  Michelle Grass INFO  What is the best way for the office to contact you? OK to leave message on   voicemail  Preferred Call Back Phone Number? [phone]  ---------------------------------------------------------------------------  --------------  SCRIPT ANSWERS  Relationship to Patient?  Self

## 2023-01-17 NOTE — TELEPHONE ENCOUNTER
I have not seen patient since last April.   Please advise patient to make an appointment for follow-up

## 2023-01-17 NOTE — TELEPHONE ENCOUNTER
----- Message from Korina Conte sent at 1/17/2023  1:54 PM EST -----  Subject: Message to Provider    QUESTIONS  Information for Provider? Pt is calling in stating her rheumatologist told   her to get her blood work done every 3 month due to practice closing. Pt   needs to have blood work done in February. ---------------------------------------------------------------------------  --------------  Kalyn SANTOYO  [phone]; OK to leave message on voicemail  ---------------------------------------------------------------------------  --------------  SCRIPT ANSWERS  Relationship to Patient?  Self

## 2023-01-20 ENCOUNTER — OFFICE VISIT (OUTPATIENT)
Dept: FAMILY MEDICINE CLINIC | Age: 63
End: 2023-01-20
Payer: COMMERCIAL

## 2023-01-20 VITALS
TEMPERATURE: 97.2 F | WEIGHT: 230 LBS | HEIGHT: 64 IN | DIASTOLIC BLOOD PRESSURE: 82 MMHG | OXYGEN SATURATION: 95 % | SYSTOLIC BLOOD PRESSURE: 130 MMHG | BODY MASS INDEX: 39.27 KG/M2 | HEART RATE: 89 BPM

## 2023-01-20 DIAGNOSIS — E66.01 MORBID OBESITY (HCC): ICD-10-CM

## 2023-01-20 DIAGNOSIS — F41.9 ANXIETY: ICD-10-CM

## 2023-01-20 DIAGNOSIS — M05.79 RHEUMATOID ARTHRITIS INVOLVING MULTIPLE SITES WITH POSITIVE RHEUMATOID FACTOR (HCC): Primary | ICD-10-CM

## 2023-01-20 PROCEDURE — G8484 FLU IMMUNIZE NO ADMIN: HCPCS | Performed by: FAMILY MEDICINE

## 2023-01-20 PROCEDURE — 99213 OFFICE O/P EST LOW 20 MIN: CPT | Performed by: FAMILY MEDICINE

## 2023-01-20 PROCEDURE — G8427 DOCREV CUR MEDS BY ELIG CLIN: HCPCS | Performed by: FAMILY MEDICINE

## 2023-01-20 PROCEDURE — G8417 CALC BMI ABV UP PARAM F/U: HCPCS | Performed by: FAMILY MEDICINE

## 2023-01-20 PROCEDURE — 1036F TOBACCO NON-USER: CPT | Performed by: FAMILY MEDICINE

## 2023-01-20 PROCEDURE — 3017F COLORECTAL CA SCREEN DOC REV: CPT | Performed by: FAMILY MEDICINE

## 2023-01-20 SDOH — ECONOMIC STABILITY: FOOD INSECURITY: WITHIN THE PAST 12 MONTHS, THE FOOD YOU BOUGHT JUST DIDN'T LAST AND YOU DIDN'T HAVE MONEY TO GET MORE.: NEVER TRUE

## 2023-01-20 SDOH — ECONOMIC STABILITY: FOOD INSECURITY: WITHIN THE PAST 12 MONTHS, YOU WORRIED THAT YOUR FOOD WOULD RUN OUT BEFORE YOU GOT MONEY TO BUY MORE.: NEVER TRUE

## 2023-01-20 ASSESSMENT — PATIENT HEALTH QUESTIONNAIRE - PHQ9
1. LITTLE INTEREST OR PLEASURE IN DOING THINGS: 0
2. FEELING DOWN, DEPRESSED OR HOPELESS: 0
SUM OF ALL RESPONSES TO PHQ9 QUESTIONS 1 & 2: 0
SUM OF ALL RESPONSES TO PHQ QUESTIONS 1-9: 0

## 2023-01-20 ASSESSMENT — SOCIAL DETERMINANTS OF HEALTH (SDOH): HOW HARD IS IT FOR YOU TO PAY FOR THE VERY BASICS LIKE FOOD, HOUSING, MEDICAL CARE, AND HEATING?: NOT HARD AT ALL

## 2023-01-20 NOTE — PROGRESS NOTES
Portions of this chart may have been created with voice recognition software. Occasional wrong-word or \"sound-alike\" substitutions may have occurred due to the inherent limitations of voice recognition software. Read the chart carefully and recognize, using context, where these substitutions have occurred        Chief Complaint     Blood Work (Discuss bloodwork needed. Elevated liver enzymes)               ERNESTO Knox is a 63 y.o. female here for follow-up of her chronic medical problems. 1. Rheumatoid arthritis involving multiple sites with positive rheumatoid factor ([address]  Arthritis is currently stable and well-controlled. She is regularly following up with rheumatologist.  However her rheumatologist has recently moved and she is waiting for reestablishing with her versus joining another provider at this time. She is currently taking sulfasalazine for control of her symptoms. Recently she had blood work done through her dermatologist which showed a mild elevation in AST at 36 however no significant abnormalities found otherwise    2. Anxiety  Anxiety symptoms are currently well controlled. No worsening extremes of mood changes suicidal thoughts or ideations or any other significant symptoms at this time. No side effects noted from the medications. Continue current management. REVIEW OF SYSTEMS:  CONSTITUTIONAL: No weight loss, fever, weakness or fatigue. HEENT:No sore throat, runny nose, earache. No vision or hearing disturbances   CARDIOVASCULAR: No chest pain,No palpitations or edema. RESPIRATORY : No shortness of breath, cough. GASTROINTESTINAL: No nausea, vomiting, diarrhea or constipation. No abdominal pain. GENITOURINARY:No dysuria, urgency, frequency, hematuria. NEUROLOGICAL: No headache, dizziness or syncope. MUSCULOSKELETAL: No muscle, back pain, joint pain or stiffness. PSYCHIATRIC : No mood changes  SKIN: No rash or itching.       Lab Results   Component Value Date    WBC 6.8 11/30/2022    HGB 14.8 11/30/2022    HCT 44.2 11/30/2022    MCV 94.3 11/30/2022     11/30/2022      Lab Results   Component Value Date    LABA1C 5.5 02/28/2022     Lab Results   Component Value Date    .2 02/28/2022     Lab Results   Component Value Date    TSH 2.64 02/28/2022     Lab Results   Component Value Date    CHOL 260 (H) 02/28/2022     Lab Results   Component Value Date    TRIG 96 02/28/2022     Lab Results   Component Value Date    HDL 53 02/28/2022     Lab Results   Component Value Date    LDLCALC 188 (H) 02/28/2022     Lab Results   Component Value Date    LABVLDL 19 02/28/2022     No results found for: [facility]   Lab Results   Component Value Date     (L) 08/24/2022    K 4.7 08/24/2022     08/24/2022    CO2 23 08/24/2022    BUN 18 08/24/2022    CREATININE 0.6 11/30/2022    GLUCOSE 95 08/24/2022    CALCIUM 9.8 08/24/2022    PROT 7.2 08/24/2022    LABALBU 4.8 08/24/2022    BILITOT 0.4 08/24/2022    ALKPHOS 85 08/24/2022    AST 24 11/30/2022    ALT 35 11/30/2022    LABGLOM >60 11/30/2022    GFRAA >60 08/24/2022    AGRATIO 2.0 08/24/2022    GLOB 2.4 11/24/2020           Current Outpatient Medications   Medication Sig Dispense Refill    sertraline (ZOLOFT) 50 MG tablet TAKE 1.5 TABLETS BY MOUTH EVERY DAY 90 tablet 1    sulfaSALAzine (AZULFIDINE) 500 MG tablet TAKE 2 TABLETS BY MOUTH TWICE A  tablet 1    JUBLIA 10 % SOLN       Calcium Carbonate-Vit D-Min (CALCIUM 1200) 5563-4996 MG-UNIT CHEW Take 1 tablet by mouth daily 30 tablet 1    fluticasone (FLONASE) 50 MCG/ACT nasal spray 1 spray each nostril bid 48 g 3    spironolactone (ALDACTONE) 50 MG tablet       LORazepam (ATIVAN) 0.5 MG tablet Take 0.5 mg by mouth every 6 hours as needed. MINOXIDIL, TOPICAL, 5 % SOLN Apply topically       No current facility-administered medications for this visit.        Allergies   Allergen Reactions    Plaquenil [Hydroxychloroquine Sulfate] Rash         Past medical, Surgical,Family, Social History Reviewed and Updated in chart today. OBJECTIVE:      Vitals:    01/20/23 1204   BP: 130/82   Pulse: 89   Temp: 97.2 °F (36.2 °C)   SpO2: 95%   Weight: 230 lb (104.3 kg)   Height: 5' 4\" (1.626 m)         Constitutional: Patient appears well-nourished, not in any distress. HENT:  Head: Normocephalic and atraumatic. Eyes: Conjunctivae and EOM are normal  Right Ear: External ear normal.  Left Ear: External ear normal.   Nose: Nose normal.  Mouth/Throat: Oropharynx is clear and moist.   Neck: Normal range of motion. Neck supple. Lymphatic: No cervical lymphadenopathy  Cardiovascular: Normal rate, regular rhythm, normal heart sounds and intact distal pulses. Pulmonary/Chest: Effort normal and breath sounds normal, no wheezes or rhonchi. Neurological:Patient is alert, oriented to person, place, and time. No obvious focal neurological deficits  Skin: Skin is warm and moist.  Psychiatric:Patient has a normal mood and affect, behavior is normal. Judgment and thought content normal.    ASSESSMENT AND PLAN    Madi Villa was seen today for blood work. Diagnoses and all orders for this visit:    Rheumatoid arthritis involving multiple sites with positive rheumatoid factor ([facility] [address]    Anxiety           DISCHARGE MEDICATION LIST        Medication List            Accurate as of January 20, 2023  1:31 PM. If you have any questions, ask your nurse or doctor.                 CONTINUE taking these medications      Calcium 1200 5369-8179 MG-UNIT Chew  Take 1 tablet by mouth daily     fluticasone 50 MCG/ACT nasal spray  Commonly known as: FLONASE  1 spray each nostril bid     Jublia 10 % Soln  Generic drug: Efinaconazole     LORazepam 0.5 MG tablet  Commonly known as: ATIVAN     MINOXIDIL (TOPICAL) 5 % Soln     sertraline 50 MG tablet  Commonly known as: ZOLOFT  TAKE 1.5 TABLETS BY MOUTH EVERY DAY     spironolactone 50 MG tablet  Commonly known as: ALDACTONE     sulfaSALAzine 500 MG tablet  Commonly known as: AZULFIDINE  TAKE 2 TABLETS BY MOUTH TWICE A DAY               Return if symptoms worsen or fail to improve. Please refer to diagnosis, orders and patient instructions for further recommendations given. Body mass index is 39.48 kg/m². . Goals of Healthy standard of living discussed. Emphasis given on appropriate diet and routine regular physical activity with cardio and strength training as much as tolerated advised. All patient's questions and concerns were addressed appropriately. All orders, handouts were reviewed in detail with the patient and patient voiced understanding verbally.

## 2023-01-25 ENCOUNTER — PATIENT MESSAGE (OUTPATIENT)
Dept: FAMILY MEDICINE CLINIC | Age: 63
End: 2023-01-25

## 2023-01-26 RX ORDER — SERTRALINE HYDROCHLORIDE 100 MG/1
100 TABLET, FILM COATED ORAL DAILY
Qty: 30 TABLET | Refills: 3 | Status: SHIPPED | OUTPATIENT
Start: 2023-01-26

## 2023-01-26 NOTE — TELEPHONE ENCOUNTER
From: Viviane Magallon  To: Dr. Katya Alarcon: 1/25/2023 10:53 AM EST  Subject: Follow up-Meds    Hello Dr Estela Rangel you are having a good morning. I am reaching out about the Sertraline that I am taking. 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 I am feeling very anxious, tense and emotionally overwhelmed. I dont want to kick off a Rheumatoid Arthritis flare up and was thinking that we should increase my meds for now to reduce some of this anxiety. Can you help with that? Thank you!   Nick Jackman

## 2023-02-01 DIAGNOSIS — Z00.00 ROUTINE ADULT HEALTH MAINTENANCE: Primary | ICD-10-CM

## 2023-02-01 DIAGNOSIS — Z00.00 ROUTINE ADULT HEALTH MAINTENANCE: ICD-10-CM

## 2023-02-01 DIAGNOSIS — E78.2 MIXED HYPERLIPIDEMIA: Primary | ICD-10-CM

## 2023-02-01 DIAGNOSIS — M05.79 RHEUMATOID ARTHRITIS INVOLVING MULTIPLE SITES WITH POSITIVE RHEUMATOID FACTOR (HCC): ICD-10-CM

## 2023-02-01 LAB
A/G RATIO: 1.9 (ref 1.1–2.2)
ALBUMIN SERPL-MCNC: 4.4 G/DL (ref 3.4–5)
ALP BLD-CCNC: 78 U/L (ref 40–129)
ALT SERPL-CCNC: 27 U/L (ref 10–40)
ANION GAP SERPL CALCULATED.3IONS-SCNC: 13 MMOL/L (ref 3–16)
AST SERPL-CCNC: 18 U/L (ref 15–37)
BASOPHILS ABSOLUTE: 0 K/UL (ref 0–0.2)
BASOPHILS RELATIVE PERCENT: 0.5 %
BILIRUB SERPL-MCNC: 0.3 MG/DL (ref 0–1)
BUN BLDV-MCNC: 18 MG/DL (ref 7–20)
CALCIUM SERPL-MCNC: 9.6 MG/DL (ref 8.3–10.6)
CHLORIDE BLD-SCNC: 104 MMOL/L (ref 99–110)
CHOLESTEROL, TOTAL: 229 MG/DL (ref 0–199)
CO2: 24 MMOL/L (ref 21–32)
CREAT SERPL-MCNC: 0.6 MG/DL (ref 0.6–1.2)
EOSINOPHILS ABSOLUTE: 0.1 K/UL (ref 0–0.6)
EOSINOPHILS RELATIVE PERCENT: 1.2 %
GFR SERPL CREATININE-BSD FRML MDRD: >60 ML/MIN/{1.73_M2}
GLUCOSE BLD-MCNC: 106 MG/DL (ref 70–99)
HCT VFR BLD CALC: 45 % (ref 36–48)
HDLC SERPL-MCNC: 56 MG/DL (ref 40–60)
HEMOGLOBIN: 14.4 G/DL (ref 12–16)
LDL CHOLESTEROL CALCULATED: 139 MG/DL
LYMPHOCYTES ABSOLUTE: 2.1 K/UL (ref 1–5.1)
LYMPHOCYTES RELATIVE PERCENT: 32.6 %
MCH RBC QN AUTO: 29.9 PG (ref 26–34)
MCHC RBC AUTO-ENTMCNC: 31.9 G/DL (ref 31–36)
MCV RBC AUTO: 93.7 FL (ref 80–100)
MONOCYTES ABSOLUTE: 0.6 K/UL (ref 0–1.3)
MONOCYTES RELATIVE PERCENT: 8.4 %
NEUTROPHILS ABSOLUTE: 3.8 K/UL (ref 1.7–7.7)
NEUTROPHILS RELATIVE PERCENT: 57.3 %
PDW BLD-RTO: 12.8 % (ref 12.4–15.4)
PLATELET # BLD: 231 K/UL (ref 135–450)
PMV BLD AUTO: 10.5 FL (ref 5–10.5)
POTASSIUM SERPL-SCNC: 4.3 MMOL/L (ref 3.5–5.1)
RBC # BLD: 4.81 M/UL (ref 4–5.2)
SODIUM BLD-SCNC: 141 MMOL/L (ref 136–145)
TOTAL PROTEIN: 6.7 G/DL (ref 6.4–8.2)
TRIGL SERPL-MCNC: 169 MG/DL (ref 0–150)
TSH SERPL DL<=0.05 MIU/L-ACNC: 2.79 UIU/ML (ref 0.27–4.2)
VLDLC SERPL CALC-MCNC: 34 MG/DL
WBC # BLD: 6.6 K/UL (ref 4–11)

## 2023-02-01 RX ORDER — SULFASALAZINE 500 MG/1
TABLET ORAL
Qty: 360 TABLET | Refills: 1 | OUTPATIENT
Start: 2023-02-01

## 2023-02-01 NOTE — TELEPHONE ENCOUNTER
Discussed with MD. Pt not due for refill-- script on 11/30 was sent w/ 1 refill. Pt notified. No further questions or concerns, will callback if this changes.

## 2023-02-01 NOTE — TELEPHONE ENCOUNTER
LOV: 1/20/23    Pt would like a 90 day refill. Pt is not going to be insured for the month of March and needs the rx now to carry her through until she gets insured.

## 2023-05-22 ENCOUNTER — OFFICE VISIT (OUTPATIENT)
Dept: FAMILY MEDICINE CLINIC | Age: 63
End: 2023-05-22
Payer: COMMERCIAL

## 2023-05-22 ENCOUNTER — PATIENT MESSAGE (OUTPATIENT)
Dept: FAMILY MEDICINE CLINIC | Age: 63
End: 2023-05-22

## 2023-05-22 VITALS
OXYGEN SATURATION: 94 % | DIASTOLIC BLOOD PRESSURE: 82 MMHG | BODY MASS INDEX: 39.75 KG/M2 | SYSTOLIC BLOOD PRESSURE: 126 MMHG | HEART RATE: 96 BPM | WEIGHT: 232.8 LBS | TEMPERATURE: 97 F | HEIGHT: 64 IN

## 2023-05-22 DIAGNOSIS — Z00.00 ROUTINE ADULT HEALTH MAINTENANCE: Primary | ICD-10-CM

## 2023-05-22 DIAGNOSIS — Z00.00 ROUTINE ADULT HEALTH MAINTENANCE: ICD-10-CM

## 2023-05-22 DIAGNOSIS — F41.9 ANXIETY: ICD-10-CM

## 2023-05-22 DIAGNOSIS — E66.01 MORBID OBESITY (HCC): ICD-10-CM

## 2023-05-22 DIAGNOSIS — F41.9 ANXIETY: Primary | ICD-10-CM

## 2023-05-22 DIAGNOSIS — R06.09 EXERTIONAL DYSPNEA: ICD-10-CM

## 2023-05-22 DIAGNOSIS — R73.09 BLOOD GLUCOSE ABNORMAL: ICD-10-CM

## 2023-05-22 DIAGNOSIS — G43.909 MIGRAINE WITHOUT STATUS MIGRAINOSUS, NOT INTRACTABLE, UNSPECIFIED MIGRAINE TYPE: ICD-10-CM

## 2023-05-22 LAB
CHOLEST SERPL-MCNC: 275 MG/DL (ref 0–199)
HDLC SERPL-MCNC: 74 MG/DL (ref 40–60)
LDLC SERPL CALC-MCNC: 162 MG/DL
TRIGL SERPL-MCNC: 194 MG/DL (ref 0–150)
TSH SERPL DL<=0.005 MIU/L-ACNC: 2.31 UIU/ML (ref 0.27–4.2)
VLDLC SERPL CALC-MCNC: 39 MG/DL

## 2023-05-22 PROCEDURE — 99214 OFFICE O/P EST MOD 30 MIN: CPT | Performed by: FAMILY MEDICINE

## 2023-05-22 PROCEDURE — 99396 PREV VISIT EST AGE 40-64: CPT | Performed by: FAMILY MEDICINE

## 2023-05-22 RX ORDER — SERTRALINE HYDROCHLORIDE 100 MG/1
100 TABLET, FILM COATED ORAL DAILY
Qty: 90 TABLET | Refills: 1 | Status: SHIPPED | OUTPATIENT
Start: 2023-05-22

## 2023-05-22 RX ORDER — BUTALBITAL, ASPIRIN, AND CAFFEINE 325; 50; 40 MG/1; MG/1; MG/1
1 CAPSULE ORAL EVERY 8 HOURS PRN
Qty: 30 CAPSULE | Refills: 0 | Status: SHIPPED | OUTPATIENT
Start: 2023-05-22 | End: 2023-06-01

## 2023-05-22 RX ORDER — FEXOFENADINE HCL AND PSEUDOEPHEDRINE HCI 180; 240 MG/1; MG/1
1 TABLET, EXTENDED RELEASE ORAL DAILY
Qty: 90 TABLET | Refills: 1 | Status: SHIPPED | OUTPATIENT
Start: 2023-05-22

## 2023-05-22 SDOH — ECONOMIC STABILITY: INCOME INSECURITY: HOW HARD IS IT FOR YOU TO PAY FOR THE VERY BASICS LIKE FOOD, HOUSING, MEDICAL CARE, AND HEATING?: NOT HARD AT ALL

## 2023-05-22 SDOH — ECONOMIC STABILITY: HOUSING INSECURITY
IN THE LAST 12 MONTHS, WAS THERE A TIME WHEN YOU DID NOT HAVE A STEADY PLACE TO SLEEP OR SLEPT IN A SHELTER (INCLUDING NOW)?: NO

## 2023-05-22 SDOH — ECONOMIC STABILITY: FOOD INSECURITY: WITHIN THE PAST 12 MONTHS, YOU WORRIED THAT YOUR FOOD WOULD RUN OUT BEFORE YOU GOT MONEY TO BUY MORE.: NEVER TRUE

## 2023-05-22 SDOH — ECONOMIC STABILITY: FOOD INSECURITY: WITHIN THE PAST 12 MONTHS, THE FOOD YOU BOUGHT JUST DIDN'T LAST AND YOU DIDN'T HAVE MONEY TO GET MORE.: NEVER TRUE

## 2023-05-22 NOTE — TELEPHONE ENCOUNTER
From: Stephen Villalobos  To: Dr. Darlin Herrera: 5/22/2023 3:16 PM EDT  Subject: Mosie Picking Dr Vahe Byrd-    I forgot to mention a refill for Lorazepam. I use it for anxiety, primarily at night.      Thank you  Stephen Villalobos

## 2023-05-23 LAB
EST. AVERAGE GLUCOSE BLD GHB EST-MCNC: 116.9 MG/DL
HBA1C MFR BLD: 5.7 %

## 2023-05-23 RX ORDER — LORAZEPAM 0.5 MG/1
0.5 TABLET ORAL DAILY PRN
Qty: 30 TABLET | Refills: 0 | Status: SHIPPED | OUTPATIENT
Start: 2023-05-23 | End: 2023-08-21

## 2023-05-23 NOTE — PROGRESS NOTES
Portions of this chart may have been created with voice recognition software. Occasional wrong-word or \"sound-alike\" substitutions may have occurred due to the inherent limitations of voice recognition software. Read the chart carefully and recognize, using context, where these substitutions have occurred      Chief Complaint     Annual Exam       SUBJECTIVE    Mirna Puente is a 66 y.o. female here for routine adult health maintenance    1. Routine adult health maintenance      Health Maintenance   Topic Date Due    Breast cancer screen  05/12/2023    DTaP/Tdap/Td vaccine (4 - Td or Tdap) 11/11/2023    Depression Screen  01/20/2024    A1C test (Diabetic or Prediabetic)  05/22/2024    Colorectal Cancer Screen  06/30/2024    Lipids  05/22/2028    Flu vaccine  Completed    Shingles vaccine  Completed    COVID-19 Vaccine  Completed    Hepatitis C screen  Completed    HIV screen  Completed    Hepatitis A vaccine  Aged Out    Hib vaccine  Aged Out    Meningococcal (ACWY) vaccine  Aged Out    Pneumococcal 0-64 years Vaccine  Aged Out    Diabetes screen  Discontinued    Cervical cancer screen  Discontinued     Patient Care Team:  Kate Beltre MD as PCP - General (Family Medicine)  Kate Beltre MD as PCP - Empaneled Provider  Owen Granados DPM as Consulting Physician (Podiatry)     Age-appropriate preventive healthcare recommendations discussed with the patient. Health maintenance reviewed and updated as follows. Discussed routine and annual follow-ups with dermatologist, ophthalmologist, dentist and other care team providers in his healthcare team.  Informed decisions were made to proceed with the following testing.      - Hemoglobin A1C; Future  - Lipid Panel; Future  - TSH; Future    2. Exertional dyspnea  Patient feels like she has gained weight and has some exertional dyspnea as well as she has a trip planned for [facility] next summer and she wanted to make sure her heart is healthy before she goes on the trip.

## 2023-07-07 ENCOUNTER — HOSPITAL ENCOUNTER (OUTPATIENT)
Dept: CT IMAGING | Age: 63
Discharge: HOME | End: 2023-07-07
Payer: COMMERCIAL

## 2023-07-07 DIAGNOSIS — R06.09 EXERTIONAL DYSPNEA: ICD-10-CM

## 2023-07-07 PROCEDURE — 75571 CT HRT W/O DYE W/CA TEST: CPT

## 2023-07-07 PROCEDURE — 93005 ELECTROCARDIOGRAM TRACING: CPT

## 2023-07-09 LAB
EKG ATRIAL RATE: 75 BPM
EKG DIAGNOSIS: NORMAL
EKG P AXIS: 34 DEGREES
EKG P-R INTERVAL: 166 MS
EKG Q-T INTERVAL: 360 MS
EKG QRS DURATION: 78 MS
EKG QTC CALCULATION (BAZETT): 402 MS
EKG R AXIS: 12 DEGREES
EKG T AXIS: 46 DEGREES
EKG VENTRICULAR RATE: 75 BPM

## 2023-07-17 DIAGNOSIS — R93.1 ABNORMAL SCREENING CARDIAC CT: Primary | ICD-10-CM

## 2023-07-21 ENCOUNTER — TELEMEDICINE (OUTPATIENT)
Dept: FAMILY MEDICINE CLINIC | Age: 63
End: 2023-07-21
Payer: COMMERCIAL

## 2023-07-21 DIAGNOSIS — R93.1 ABNORMAL SCREENING CARDIAC CT: Primary | ICD-10-CM

## 2023-07-21 DIAGNOSIS — E78.2 MIXED HYPERLIPIDEMIA: ICD-10-CM

## 2023-07-21 PROCEDURE — 99422 OL DIG E/M SVC 11-20 MIN: CPT | Performed by: FAMILY MEDICINE

## 2023-07-21 NOTE — PROGRESS NOTES
by patient/caregiver or practitioner observation)    Blood pressure-  Heart rate-    Respiratory rate-    Temperature-  Pulse oximetry-       ASSESSMENT/PLAN:    Diagnoses and all orders for this visit:    Abnormal screening cardiac CT    Mixed hyperlipidemia         Return if symptoms worsen or fail to improve. Sherren Parsons, was evaluated through a synchronous (real-time) audio/video encounter. The patient (or guardian if applicable) is aware that this is a billable service, which includes applicable co-pays. This Virtual Visit was conducted with patient's (and/or legal guardian's) consent. Patient identification was verified, and a caregiver was present when appropriate. The patient was located at Home: [address] [facility]  Provider was located at [facility] ([address] [place], [place] [address]t): [address] [facility] [address],[facility] [address]        Total time spent on this encounter: 15 minutes    --Seamus Jang MD on 7/21/2023 at 9:30 AM    An electronic signature was used to authenticate this note.

## 2023-07-27 ENCOUNTER — OFFICE VISIT (OUTPATIENT)
Dept: CARDIOLOGY CLINIC | Age: 63
End: 2023-07-27

## 2023-07-27 VITALS
DIASTOLIC BLOOD PRESSURE: 80 MMHG | BODY MASS INDEX: 40.75 KG/M2 | SYSTOLIC BLOOD PRESSURE: 120 MMHG | HEART RATE: 79 BPM | WEIGHT: 237.4 LBS

## 2023-07-27 DIAGNOSIS — E78.5 HYPERLIPIDEMIA, UNSPECIFIED HYPERLIPIDEMIA TYPE: Primary | ICD-10-CM

## 2023-07-27 DIAGNOSIS — R06.09 DOE (DYSPNEA ON EXERTION): ICD-10-CM

## 2023-07-27 RX ORDER — ASPIRIN 81 MG/1
81 TABLET ORAL DAILY
Qty: 90 TABLET | Refills: 3 | Status: SHIPPED | OUTPATIENT
Start: 2023-07-27

## 2023-07-27 RX ORDER — ATORVASTATIN CALCIUM 40 MG/1
40 TABLET, FILM COATED ORAL DAILY
Qty: 90 TABLET | Refills: 3 | Status: SHIPPED | OUTPATIENT
Start: 2023-07-27

## 2023-07-27 NOTE — PROGRESS NOTES
Cc: abnormal calcium score    HPI:     Patient is a 82-year-old woman with history of RA, hyperlipidemia. Patient asked her PCP for a test to assess for the presence of any CAD in view of her advanced age and plans to travel to [place]. In terms of symptoms, patient has some atypical, nonexertional right-sided chest and back pain. However she admits to mild exertional dyspnea and fatigue. CT calcium score 7/7/2023: Total Agatston score of 130. ECG 7/7/2023: Normal    FLP 05/2023: , HDL 74, ,  on no statin. Histories     Past Medical History:   has a past medical history of Allergic rhinitis, cause unspecified, Anxiety state, unspecified, Arthritis, Blepharophimosis, Contact dermatitis and other eczema, due to unspecified cause, Depressive disorder, not elsewhere classified, Headache(784.0), Hearing loss, Hyperglycemia, Insomnia, unspecified, Obesity, unspecified, Other and unspecified hyperlipidemia, Other specified congenital anomaly of skin, Pain in both feet, Screening colonoscopy, and Unspecified sinusitis (chronic). Surgical History:   has a past surgical history that includes Hysterectomy (01/01/2000); lipoma resection (01/01/2007); Colonoscopy (07/21/2014); and Partial hysterectomy. Social History:   reports that she has never smoked. She has never used smokeless tobacco. She reports current alcohol use. She reports that she does not use drugs. Family History:  No evidence for sudden cardiac death or premature CAD      Medications:     Home medications were reviewed and are listed below    Prior to Admission medications    Medication Sig Start Date End Date Taking?  Authorizing Provider   atorvastatin (LIPITOR) 40 MG tablet Take 1 tablet by mouth daily 7/27/23  Yes Daisha Sandoval MD   aspirin 81 MG EC tablet Take 1 tablet by mouth daily 7/27/23  Yes Daisha Sandoval MD   LORazepam (ATIVAN) 0.5 MG tablet Take 1 tablet by mouth daily as needed for Anxiety for up to 90

## 2023-07-27 NOTE — PATIENT INSTRUCTIONS
Start ASA 81 mg daily  Start Lipitor 40 mg daily  In ~ 3 -6 months have fasting lab work    We Will schedule your exercise testing.

## 2023-07-28 DIAGNOSIS — Z13.6 ENCOUNTER FOR SCREENING FOR CARDIOVASCULAR DISORDERS: Primary | ICD-10-CM

## 2023-08-01 ENCOUNTER — PROCEDURE VISIT (OUTPATIENT)
Dept: CARDIOLOGY CLINIC | Age: 63
End: 2023-08-01
Payer: COMMERCIAL

## 2023-08-01 DIAGNOSIS — Z13.6 ENCOUNTER FOR SCREENING FOR CARDIOVASCULAR DISORDERS: ICD-10-CM

## 2023-08-01 PROCEDURE — MISCABI SCREENING ABI: Performed by: INTERNAL MEDICINE

## 2023-08-11 ENCOUNTER — TELEPHONE (OUTPATIENT)
Dept: FAMILY MEDICINE CLINIC | Age: 63
End: 2023-08-11

## 2023-08-11 DIAGNOSIS — F41.9 ANXIETY: ICD-10-CM

## 2023-08-11 RX ORDER — LORAZEPAM 0.5 MG/1
0.5 TABLET ORAL DAILY PRN
Qty: 30 TABLET | Refills: 0 | Status: CANCELLED | OUTPATIENT
Start: 2023-08-11 | End: 2023-11-09

## 2023-08-11 NOTE — TELEPHONE ENCOUNTER
Pt is also calling because she does not believe she should have been billed for the VV on 07/21/2023, she is wondering why speaking to the Dr about test results had to be a visit and not just a phone call. She also states the labs done in May should have covered but weren't.

## 2023-08-14 NOTE — TELEPHONE ENCOUNTER
Please verify with the patient why she needs the dose of the lorazepam increased. If she feels like her anxiety is not under control please advise taking up a follow-up appointment to discuss more about this. Also about billing questions her concerns have been related to Apryl Gilbert our  and she will contact her soon regarding this. Forward this to Apryl Gilbert as well.

## 2023-08-15 ENCOUNTER — HOSPITAL ENCOUNTER (OUTPATIENT)
Dept: NON INVASIVE DIAGNOSTICS | Age: 63
Discharge: HOME | End: 2023-08-15
Payer: COMMERCIAL

## 2023-08-15 DIAGNOSIS — R06.09 DOE (DYSPNEA ON EXERTION): ICD-10-CM

## 2023-08-15 LAB
LV EF: 72 %
LVEF MODALITY: NORMAL

## 2023-08-15 PROCEDURE — 78452 HT MUSCLE IMAGE SPECT MULT: CPT

## 2023-08-15 PROCEDURE — 3430000000 HC RX DIAGNOSTIC RADIOPHARMACEUTICAL: Performed by: INTERNAL MEDICINE

## 2023-08-15 PROCEDURE — 93017 CV STRESS TEST TRACING ONLY: CPT

## 2023-08-15 PROCEDURE — A9502 TC99M TETROFOSMIN: HCPCS | Performed by: INTERNAL MEDICINE

## 2023-08-15 RX ADMIN — TETROFOSMIN 30 MILLICURIE: 1.38 INJECTION, POWDER, LYOPHILIZED, FOR SOLUTION INTRAVENOUS at 10:30

## 2023-08-15 RX ADMIN — TETROFOSMIN 10 MILLICURIE: 1.38 INJECTION, POWDER, LYOPHILIZED, FOR SOLUTION INTRAVENOUS at 08:50

## 2023-08-15 NOTE — TELEPHONE ENCOUNTER
Called 8-14-23 and spoke to pt and inquired why she needed the increase for the lorazepam. Pt stated that she was on an increased dose for years and is just requesting to be back on dose she was on in the past. I explained to pt, per PCP that if she feels her current does isn't helping. PCP is recommending pt to come in for an appointment to discuss. Pt stated she does not understand why she needs to come in, as she stated she was on an increased dose in the past.     Pt then started to speak about how her current PCP and previous PCP are very different and spoke about the fact that she as charged for a VV, even though it not a VV, it was just a telephone call from PCP. I tried explaining to pt that a VV can either be a telephone call or a video visit. I explained to pt that the VV was requested by herself to discuss lab results, that were already given and pt had more questions that she wanted to speak directly with PCP about. Informed pt multiple times that not every PCP is the same and some things that her previous PCP did, current PCP may not do. I informed pt that PCP delegates us CCMA's in the office to give lab results, etc as that is apart of our job title and if anything needs to be discussed with the PCP directly, the best thing is for pt to schedule appointment to meet with PCP and discuss. Pt stated that she is just very disappointed and just wanted to talk to PCP in regards to her results and the fact that she is being charged for a VV that was less than 5 minutes, was not okay. Explained to pt that our office does not handle anything with billing and if there are any billing questions or concerns, that would need to go through the billing department or our practice manager. Informed pt that I have already routed billing concerns that were sent to our practice manager and we are just waiting on a reply. Pt questioned the turn around time for when she will hear back from practice manager.  I did

## 2023-09-05 RX ORDER — SERTRALINE HYDROCHLORIDE 100 MG/1
TABLET, FILM COATED ORAL
Qty: 90 TABLET | Refills: 1 | Status: SHIPPED | OUTPATIENT
Start: 2023-09-05

## 2023-10-16 DIAGNOSIS — R06.09 DOE (DYSPNEA ON EXERTION): ICD-10-CM

## 2023-10-16 DIAGNOSIS — E78.5 HYPERLIPIDEMIA, UNSPECIFIED HYPERLIPIDEMIA TYPE: ICD-10-CM

## 2023-10-16 RX ORDER — ASPIRIN 81 MG/1
81 TABLET ORAL DAILY
Qty: 90 TABLET | Refills: 3 | Status: SHIPPED | OUTPATIENT
Start: 2023-10-16

## 2023-10-16 RX ORDER — ATORVASTATIN CALCIUM 40 MG/1
40 TABLET, FILM COATED ORAL DAILY
Qty: 90 TABLET | Refills: 3 | Status: SHIPPED | OUTPATIENT
Start: 2023-10-16

## 2023-10-16 NOTE — TELEPHONE ENCOUNTER
Requested Prescriptions     Pending Prescriptions Disp Refills    atorvastatin (LIPITOR) 40 MG tablet 90 tablet 3     Sig: Take 1 tablet by mouth daily    aspirin 81 MG EC tablet 90 tablet 3     Sig: Take 1 tablet by mouth daily              Last Office Visit: 7/27/23    Next Office Visit: 7/25/24

## 2023-10-20 ENCOUNTER — OFFICE VISIT (OUTPATIENT)
Dept: INTERNAL MEDICINE CLINIC | Age: 63
End: 2023-10-20
Payer: COMMERCIAL

## 2023-10-20 VITALS
WEIGHT: 238 LBS | BODY MASS INDEX: 40.63 KG/M2 | HEART RATE: 80 BPM | DIASTOLIC BLOOD PRESSURE: 88 MMHG | OXYGEN SATURATION: 96 % | HEIGHT: 64 IN | SYSTOLIC BLOOD PRESSURE: 136 MMHG | TEMPERATURE: 97.2 F

## 2023-10-20 DIAGNOSIS — E66.09 CLASS 2 OBESITY DUE TO EXCESS CALORIES WITHOUT SERIOUS COMORBIDITY WITH BODY MASS INDEX (BMI) OF 36.0 TO 36.9 IN ADULT: Primary | ICD-10-CM

## 2023-10-20 DIAGNOSIS — M05.79 RHEUMATOID ARTHRITIS INVOLVING MULTIPLE SITES WITH POSITIVE RHEUMATOID FACTOR (HCC): ICD-10-CM

## 2023-10-20 DIAGNOSIS — F41.9 ANXIETY: ICD-10-CM

## 2023-10-20 PROCEDURE — 99213 OFFICE O/P EST LOW 20 MIN: CPT | Performed by: STUDENT IN AN ORGANIZED HEALTH CARE EDUCATION/TRAINING PROGRAM

## 2023-10-20 RX ORDER — LORAZEPAM 1 MG/1
1 TABLET ORAL NIGHTLY PRN
Qty: 30 TABLET | Refills: 0 | Status: SHIPPED | OUTPATIENT
Start: 2023-10-20 | End: 2023-11-19

## 2023-10-20 RX ORDER — SEMAGLUTIDE 0.25 MG/.5ML
0.25 INJECTION, SOLUTION SUBCUTANEOUS
Qty: 2 ML | Refills: 1 | Status: SHIPPED | OUTPATIENT
Start: 2023-10-20 | End: 2023-11-17

## 2023-10-20 NOTE — PROGRESS NOTES
no mass, no rebound, no guarding   :  No costovertebral angle tenderness   Musculoskeletal:  No edema, no tenderness, no deformities. Back- no tenderness  Integument:  Well hydrated, no rash   Lymphatic:  No lymphadenopathy noted   Neurologic:  Alert & oriented x 3, CN 2-12 normal, normal motor function, normal sensory function, no focal deficits noted   Psychiatric:  Speech and behavior appropriate          Please note that this chart was generated using dragon dictation software. Although every effort was made to ensure the accuracy of this automated transcription, some errors in transcription may have occurred.        --Evangelina Robles MD

## 2023-10-20 NOTE — ASSESSMENT & PLAN NOTE
Has been on Ativan. For many years. PDMP reviewed. Discussed with patient about chronic benzodiazepine use. Refill sent today. We will work on weaning down Ativan in the near future.

## 2023-10-20 NOTE — ASSESSMENT & PLAN NOTE
Failed lifestyle modifications including diet and exercise.   Start Nora crest   Discussed with patient potential side effect, goal of therapy

## 2023-11-01 ENCOUNTER — TELEPHONE (OUTPATIENT)
Dept: INTERNAL MEDICINE CLINIC | Age: 63
End: 2023-11-01

## 2023-11-01 NOTE — TELEPHONE ENCOUNTER
Order Class:    Order Class   Normal [1]     Destination    This Order   E-PRESCRIBING INTERFACE [21006]     Action Summary    Action User: --            Warnings Override History    No Interaction Warnings Shown      Semaglutide-Weight Management (WEGOVY) 0.25 MG/pa0.5ML SOAJ SC injection  Date: 10/20/2023 Department: [address] Ordering/Authorizing: Danni Haywood MD     Outpatient Medication Detail     Disp Refills Start End    Semaglutide-Weight Management (WEGOVY) 0.25 MG/0.5ML SOAJ SC injection 2 mL 1 10/20/2023 11/17/2023    Sig - Route: Inject 0.25 mg into the skin every 7 days for 28 days - SubCUTAneous    Sent to pharmacy as: Wegovy 0.25 MG/0.5ML Subcutaneous Solution Auto-injector (Semaglutide-Weight Management)    E-Prescribing Status: Receipt confirmed by pharmacy (10/20/2023  2:47 PM EDT)    Prior authorization: Payer Waiting for Response      Intervention summary for Semaglutide-Weight Management (WEGOVY) 0.25 MG/0.5ML SOAJ SC injection (Ordered on 10/20/23)    Open Interventions    Type Subtype   Status Opened By Opened On Response Outcome   None       Closed Interventions    Type Subtype   Status Closed By Closed On Response Outcome   None       Tracking Links    Cosign Tracking   Pa needed please thanks

## 2023-11-02 NOTE — TELEPHONE ENCOUNTER
Submitted PA for Wegovy 0.25MG/0.5ML auto-injectors  Via CMM Key: [id number] STATUS: APPROVED  Coverage Start Date:10/03/2023  Coverage End Date:05/30/2024    If this requires a response please respond to the pool ( P MHCX PSC MEDICATION PRE-AUTH).      Thank you please advise patient.

## 2023-11-16 DIAGNOSIS — E11.9 TYPE 2 DIABETES MELLITUS WITHOUT COMPLICATION, WITHOUT LONG-TERM CURRENT USE OF INSULIN (HCC): Primary | ICD-10-CM

## 2023-11-16 RX ORDER — SEMAGLUTIDE 1.34 MG/ML
0.25 INJECTION, SOLUTION SUBCUTANEOUS WEEKLY
Qty: 1 ADJUSTABLE DOSE PRE-FILLED PEN SYRINGE | Refills: 1 | Status: SHIPPED | OUTPATIENT
Start: 2023-11-16 | End: 2023-12-14

## 2023-11-24 RX ORDER — SERTRALINE HYDROCHLORIDE 100 MG/1
100 TABLET, FILM COATED ORAL DAILY
Qty: 90 TABLET | Refills: 1 | Status: SHIPPED | OUTPATIENT
Start: 2023-11-24

## 2023-11-27 ENCOUNTER — TELEPHONE (OUTPATIENT)
Dept: INTERNAL MEDICINE CLINIC | Age: 63
End: 2023-11-27

## 2023-11-27 NOTE — TELEPHONE ENCOUNTER
----- Message from Shruti Carrero sent at 11/22/2023  3:22 PM EST -----  Subject: Appointment Request    Reason for Call: Established Patient Appointment needed: Routine Pre-Op    QUESTIONS    Reason for appointment request? Available appointments did not meet   patient need     Additional Information for Provider?  Pt would like to schedule a Adult   Pre-Op appt for Eyelid surgery for both eyes and brow lift, 12/18/23, Dr. Tony Lin, EKG(unknown), Pt says she may of had one with her stress Lab   done 08/25/23.   ---------------------------------------------------------------------------  --------------  Kari HUERTAS  [phone]; OK to leave message on voicemail,Do not leave any message,   patient will call back for answer,OK to respond with electronic message   via [facility] portal (only for patients who have registered [facility] account)  ---------------------------------------------------------------------------  --------------  SCRIPT ANSWERS

## 2023-11-30 ENCOUNTER — OFFICE VISIT (OUTPATIENT)
Dept: FAMILY MEDICINE CLINIC | Age: 63
End: 2023-11-30
Payer: COMMERCIAL

## 2023-11-30 VITALS
SYSTOLIC BLOOD PRESSURE: 122 MMHG | OXYGEN SATURATION: 93 % | DIASTOLIC BLOOD PRESSURE: 72 MMHG | WEIGHT: 243 LBS | TEMPERATURE: 97.1 F | HEART RATE: 100 BPM | BODY MASS INDEX: 41.71 KG/M2

## 2023-11-30 DIAGNOSIS — Z01.810 PREOP CARDIOVASCULAR EXAM: Primary | ICD-10-CM

## 2023-11-30 DIAGNOSIS — Z01.818 PRE-OP EXAMINATION: ICD-10-CM

## 2023-11-30 PROCEDURE — 93000 ELECTROCARDIOGRAM COMPLETE: CPT | Performed by: NURSE PRACTITIONER

## 2023-11-30 PROCEDURE — 99243 OFF/OP CNSLTJ NEW/EST LOW 30: CPT | Performed by: NURSE PRACTITIONER

## 2023-11-30 ASSESSMENT — ENCOUNTER SYMPTOMS
COUGH: 0
DIARRHEA: 0
EYE REDNESS: 0
CHEST TIGHTNESS: 0
CONSTIPATION: 0
COLOR CHANGE: 0
SHORTNESS OF BREATH: 0
BACK PAIN: 0
RHINORRHEA: 0
VOMITING: 0
BLOOD IN STOOL: 0
SINUS PRESSURE: 0
ABDOMINAL PAIN: 0
NAUSEA: 0
EYE ITCHING: 0
WHEEZING: 0
SORE THROAT: 0

## 2023-11-30 NOTE — PROGRESS NOTES
Subjective:     Lewis Carrel who presentsto the office today for a preoperative consultation at the request of surgeon Dr. Antonia Palacios  who plans on performing ptosis repair, posterior approach, blepharoplasty, BLL cosmetic lower lid blepharoplasty, both side cosmetic endoscopic browlift  on 12/18/23 . This consultation is requested for the specific conditions prompting preoperative evaluation (i.e. because of potential affect on operative risk): Hyperglycemia. Planned anesthesia isGeneral.  The patient has the following known anesthesia issues:  none   Patient has a bleeding risk of : none. Patient's medications, allergies, past medical, surgical,social and family histories were reviewed and updated as appropriate. Past Medical History:   Diagnosis Date    Allergic rhinitis, cause unspecified     chronic    Anxiety state, unspecified     Arthritis     Blepharophimosis     Contact dermatitis and other eczema, due to unspecified cause     Depressive disorder, not elsewhere classified     Headache(784.0)     Hearing loss     Hyperglycemia 12/12/2014    Max glyco 6.6- down to 5.8 2014 6.1 12/2016- for bing 6/2017 and start metformin if still up  Refusing metformin     Insomnia, unspecified     Obesity, unspecified     Other and unspecified hyperlipidemia     Other specified congenital anomaly of skin     Pain in both feet 12/17/2015    S/p fracture 2015     Screening colonoscopy 06/2014    normal-repeat 10 yrs     Unspecified sinusitis (chronic)      Past Surgical History:   Procedure Laterality Date    COLONOSCOPY  07/21/2014    repeat 10 yrs.     HYSTERECTOMY (CERVIX STATUS UNKNOWN)  01/01/2000    for prolapse partial    LIPOMA RESECTION  01/01/2007    left thigh    PARTIAL HYSTERECTOMY (CERVIX NOT REMOVED)       Family History   Problem Relation Age of Onset    Lung Cancer Father      Social History     Socioeconomic History    Marital status:      Spouse name: Not on file    Number of children: Not on file

## 2023-11-30 NOTE — ASSESSMENT & PLAN NOTE
Perioperative risk related to the patient's upcoming surgery is considered low. she is cleared for surgery. Pre-op exam was completed on 11/30/23 10:44 AM.     CBC with diff drawn on 11/16/2023 present in office by pt, results reviewed and WNL.

## 2023-12-30 PROBLEM — Z01.818 PRE-OP EXAMINATION: Status: RESOLVED | Noted: 2023-11-30 | Resolved: 2023-12-30

## 2024-01-04 ENCOUNTER — TELEPHONE (OUTPATIENT)
Dept: INTERNAL MEDICINE CLINIC | Age: 64
End: 2024-01-04

## 2024-01-04 ENCOUNTER — OFFICE VISIT (OUTPATIENT)
Dept: INTERNAL MEDICINE CLINIC | Age: 64
End: 2024-01-04
Payer: COMMERCIAL

## 2024-01-04 VITALS
TEMPERATURE: 97.3 F | BODY MASS INDEX: 40.29 KG/M2 | HEART RATE: 79 BPM | SYSTOLIC BLOOD PRESSURE: 130 MMHG | HEIGHT: 64 IN | DIASTOLIC BLOOD PRESSURE: 70 MMHG | WEIGHT: 236 LBS | OXYGEN SATURATION: 98 %

## 2024-01-04 DIAGNOSIS — F41.9 ANXIETY: ICD-10-CM

## 2024-01-04 DIAGNOSIS — I80.02 THROMBOPHLEBITIS OF SUPERFICIAL VEINS OF LEFT LOWER EXTREMITY: Primary | ICD-10-CM

## 2024-01-04 DIAGNOSIS — G43.909 MIGRAINE WITHOUT STATUS MIGRAINOSUS, NOT INTRACTABLE, UNSPECIFIED MIGRAINE TYPE: ICD-10-CM

## 2024-01-04 DIAGNOSIS — M79.605 PAIN OF LEFT LOWER EXTREMITY: ICD-10-CM

## 2024-01-04 PROCEDURE — 3017F COLORECTAL CA SCREEN DOC REV: CPT | Performed by: STUDENT IN AN ORGANIZED HEALTH CARE EDUCATION/TRAINING PROGRAM

## 2024-01-04 PROCEDURE — G8484 FLU IMMUNIZE NO ADMIN: HCPCS | Performed by: STUDENT IN AN ORGANIZED HEALTH CARE EDUCATION/TRAINING PROGRAM

## 2024-01-04 PROCEDURE — G8417 CALC BMI ABV UP PARAM F/U: HCPCS | Performed by: STUDENT IN AN ORGANIZED HEALTH CARE EDUCATION/TRAINING PROGRAM

## 2024-01-04 PROCEDURE — G8427 DOCREV CUR MEDS BY ELIG CLIN: HCPCS | Performed by: STUDENT IN AN ORGANIZED HEALTH CARE EDUCATION/TRAINING PROGRAM

## 2024-01-04 PROCEDURE — 99213 OFFICE O/P EST LOW 20 MIN: CPT | Performed by: STUDENT IN AN ORGANIZED HEALTH CARE EDUCATION/TRAINING PROGRAM

## 2024-01-04 PROCEDURE — 1036F TOBACCO NON-USER: CPT | Performed by: STUDENT IN AN ORGANIZED HEALTH CARE EDUCATION/TRAINING PROGRAM

## 2024-01-04 RX ORDER — LORAZEPAM 0.5 MG/1
0.5 TABLET ORAL DAILY PRN
Qty: 30 TABLET | Refills: 0 | Status: SHIPPED | OUTPATIENT
Start: 2024-01-04 | End: 2024-04-03

## 2024-01-04 RX ORDER — BUTALBITAL, ASPIRIN, AND CAFFEINE 325; 50; 40 MG/1; MG/1; MG/1
1 CAPSULE ORAL EVERY 8 HOURS PRN
Qty: 30 CAPSULE | Refills: 1 | Status: SHIPPED | OUTPATIENT
Start: 2024-01-04 | End: 2024-01-05 | Stop reason: ALTCHOICE

## 2024-01-04 ASSESSMENT — PATIENT HEALTH QUESTIONNAIRE - PHQ9
2. FEELING DOWN, DEPRESSED OR HOPELESS: 0
1. LITTLE INTEREST OR PLEASURE IN DOING THINGS: 0
SUM OF ALL RESPONSES TO PHQ9 QUESTIONS 1 & 2: 0
SUM OF ALL RESPONSES TO PHQ QUESTIONS 1-9: 0

## 2024-01-04 NOTE — ASSESSMENT & PLAN NOTE
Cord like tenderness at the right upper thigh. Given proximity, will obtain venous duplex of LLE to rule out underlying DVT.   Conservative treatment   Avoid NSAIDs due to pt currently on Sulfasalazine

## 2024-01-04 NOTE — TELEPHONE ENCOUNTER
Patient had surgery 2 1/2 weeks ago (eye lids and brow lift).    She started having a pain in her upper L leg that has now moved down to below her knee.    Patient would like to see Dr. aHywood today or tomorrow (01/05/24).    Please call and advise

## 2024-01-04 NOTE — PROGRESS NOTES
02/01/2023    CALCIUM 9.6 02/01/2023    PROT 6.7 02/01/2023    LABALBU 4.4 02/01/2023    BILITOT 0.3 02/01/2023    ALKPHOS 78 02/01/2023    AST 18 02/01/2023    ALT 27 02/01/2023    LABGLOM >60 02/01/2023    GFRAA >60 08/24/2022    AGRATIO 1.9 02/01/2023    GLOB 2.4 11/24/2020     Lab Results   Component Value Date    CHOL 275 (H) 05/22/2023    CHOL 229 (H) 02/01/2023    CHOL 260 (H) 02/28/2022     Lab Results   Component Value Date    TRIG 194 (H) 05/22/2023    TRIG 169 (H) 02/01/2023    TRIG 96 02/28/2022     Lab Results   Component Value Date    HDL 74 (H) 05/22/2023    HDL 56 02/01/2023    HDL 53 02/28/2022     Lab Results   Component Value Date    LDLCALC 162 (H) 05/22/2023    LDLCALC 139 (H) 02/01/2023    LDLCALC 188 (H) 02/28/2022     Lab Results   Component Value Date    LABVLDL 39 05/22/2023    LABVLDL 34 02/01/2023    LABVLDL 19 02/28/2022     No results found for: \"CHOLHDLRATIO\"  Lab Results   Component Value Date    LABA1C 5.7 05/22/2023     Lab Results   Component Value Date    .9 05/22/2023         Physical Exam:  Vital Signs: /70   Pulse 79   Temp 97.3 °F (36.3 °C)   Ht 1.626 m (5' 4\")   Wt 107 kg (236 lb)   LMP  (LMP Unknown)   SpO2 98%   BMI 40.51 kg/m²   Constitutional:  Well developed, well nourished, no acute distress, non-toxic appearance, obese    Eyes:  PERRL, conjunctiva normal   HENT:  Atraumatic, external ears normal, nose normal, oropharynx moist, no pharyngeal exudates. Neck- normal range of motion, no tenderness, supple   Respiratory:  No respiratory distress, normal breath sounds, no rales, no wheezing   Cardiovascular:  Normal rate, normal rhythm, no murmurs, no gallops, no rubs   GI:  Soft, nondistended, normal bowel sounds, nontender, no organomegaly, no mass, no rebound, no guarding   :  No costovertebral angle tenderness   Musculoskeletal:  No edema, no tenderness, no deformities. Back- no tenderness  Cordlike tenderness nodule along the upper left

## 2024-01-04 NOTE — ASSESSMENT & PLAN NOTE
Has been on Ativan.  For many years.  PDMP reviewed.  Discussed with patient about chronic benzodiazepine use.  Refill sent today.

## 2024-01-05 ENCOUNTER — HOSPITAL ENCOUNTER (OUTPATIENT)
Dept: VASCULAR LAB | Age: 64
Discharge: HOME | End: 2024-01-05
Payer: COMMERCIAL

## 2024-01-05 ENCOUNTER — TELEPHONE (OUTPATIENT)
Dept: INTERNAL MEDICINE CLINIC | Age: 64
End: 2024-01-05

## 2024-01-05 DIAGNOSIS — M79.605 PAIN OF LEFT LOWER EXTREMITY: ICD-10-CM

## 2024-01-05 DIAGNOSIS — I80.02 THROMBOPHLEBITIS OF SUPERFICIAL VEINS OF LEFT LOWER EXTREMITY: ICD-10-CM

## 2024-01-05 PROCEDURE — 93971 EXTREMITY STUDY: CPT

## 2024-01-05 NOTE — TELEPHONE ENCOUNTER
[phone] (home)   SPOKE with patient [facility] did not have (ELIQUIS) SO I CANCELLED IT , rx should be sent to  [facility]

## 2024-01-05 NOTE — TELEPHONE ENCOUNTER
Pt needs the new med sent to [facility] because [facility] will not fill it   [id number] - [place], [place] - [address] - P [phone] - F [phone]      MED- apixaban (ELIQUIS) 5 MG TABS tablet [[id number]]    Please call and advise

## 2024-01-05 NOTE — TELEPHONE ENCOUNTER
Pt positive for DVtT, result verbally given to Dr. Haywood.     Dr. Haywood will send blood thinner to pharm. Pt aware.    Per Pt- no other s/sx at this time.    152

## 2024-01-05 NOTE — PROGRESS NOTES
Venous duplex of the LLE prelim result shows acute distal DVT, also acute superficial thrombophlebitis. Will treat with Eliquis (starting dose for 7 days following by 5mg BID).   She also reports a family history of blood clot in her brother (provoked s/p surgery).   Will reassess in 3 months.  Discussed s&s and when she needs to present to the ED.

## 2024-01-05 NOTE — TELEPHONE ENCOUNTER
Pt would like a call from an MA  about which med is going to be called in so she can find a pharmacy that will fill it and pt does not want to go all weekend since it is related to a blood clot.

## 2024-03-08 ENCOUNTER — OFFICE VISIT (OUTPATIENT)
Dept: INTERNAL MEDICINE CLINIC | Age: 64
End: 2024-03-08
Payer: COMMERCIAL

## 2024-03-08 VITALS
WEIGHT: 240 LBS | SYSTOLIC BLOOD PRESSURE: 124 MMHG | HEIGHT: 64 IN | DIASTOLIC BLOOD PRESSURE: 80 MMHG | BODY MASS INDEX: 40.97 KG/M2 | TEMPERATURE: 97 F | OXYGEN SATURATION: 97 % | HEART RATE: 67 BPM

## 2024-03-08 DIAGNOSIS — I82.4Y2 ACUTE DEEP VEIN THROMBOSIS (DVT) OF PROXIMAL VEIN OF LEFT LOWER EXTREMITY (HCC): Primary | ICD-10-CM

## 2024-03-08 DIAGNOSIS — I80.02 THROMBOPHLEBITIS OF SUPERFICIAL VEINS OF LEFT LOWER EXTREMITY: ICD-10-CM

## 2024-03-08 PROCEDURE — G8417 CALC BMI ABV UP PARAM F/U: HCPCS | Performed by: STUDENT IN AN ORGANIZED HEALTH CARE EDUCATION/TRAINING PROGRAM

## 2024-03-08 PROCEDURE — G8427 DOCREV CUR MEDS BY ELIG CLIN: HCPCS | Performed by: STUDENT IN AN ORGANIZED HEALTH CARE EDUCATION/TRAINING PROGRAM

## 2024-03-08 PROCEDURE — 99213 OFFICE O/P EST LOW 20 MIN: CPT | Performed by: STUDENT IN AN ORGANIZED HEALTH CARE EDUCATION/TRAINING PROGRAM

## 2024-03-08 PROCEDURE — 3017F COLORECTAL CA SCREEN DOC REV: CPT | Performed by: STUDENT IN AN ORGANIZED HEALTH CARE EDUCATION/TRAINING PROGRAM

## 2024-03-08 PROCEDURE — 1036F TOBACCO NON-USER: CPT | Performed by: STUDENT IN AN ORGANIZED HEALTH CARE EDUCATION/TRAINING PROGRAM

## 2024-03-08 PROCEDURE — G8484 FLU IMMUNIZE NO ADMIN: HCPCS | Performed by: STUDENT IN AN ORGANIZED HEALTH CARE EDUCATION/TRAINING PROGRAM

## 2024-03-08 NOTE — PROGRESS NOTES
Debby Mata (:  1960) is a 63 y.o. female here for evaluation of the following chief complaint(s):  Results (Concerns with atavian ,eliquis )         ASSESSMENT/PLAN:  1. Acute deep vein thrombosis (DVT) of proximal vein of left lower extremity (HCC)  Assessment & Plan:  - Continue Eliquis, discussed with patient avoid long distance travel for least 3 months to allow anticoagulating with Eliquis.   - Given distal DVT, provoked by immobility, treat with Eliquis for 3 moths  Continue to increase mobility. Exercise as tolerated.  She reported family history of blood clots in her brother in his 40s?, provoked after a knee surgery. We may consider testing for prothrombin gene mutation, FVL, protein S, C, and antithrombin deficiency.   -Cancer screening: up to date with mammogram (10/2023), overdue colonoscopy, last PAP?  -Non smoker  2. Thrombophlebitis of superficial veins of left lower extremity  Assessment & Plan:  Now resolved   See above       Return in about 3 months (around 2024).         Subjective   SUBJECTIVE/OBJECTIVE:  LAILA Guardado presented today for follow-up visit.  Overall she doing well.  Compliant with Eliquis.  She wanted to discuss about travel plans.  She plans to travel to [place] at the end of March, about 5 hours flying.  She was recently seen by her rheumatologist, Dr. Gilbert, and concerns of family history of blood clots and wonders if she needs more testing.  She reports that her brother has blood clots after a a knee surgery.    Review of Systems:   Constitutional:  Denies fever or chills   Eyes:  Denies change in visual acuity   HENT:  Denies nasal congestion or sore throat   Respiratory:  Denies cough or shortness of breath   Cardiovascular:  Denies chest pain or edema   GI:  Denies abdominal pain, nausea, vomiting, bloody stools or diarrhea   :  Denies dysuria   Musculoskeletal:  Denies back pain or joint pain   Integument:  Denies rash   Neurologic:  Denies headache, focal

## 2024-03-20 NOTE — ASSESSMENT & PLAN NOTE
- Continue Eliquis, discussed with patient avoid long distance travel for least 3 months to allow anticoagulating with Eliquis.   - Given distal DVT, provoked by immobility, treat with Eliquis for 3 moths  Continue to increase mobility. Exercise as tolerated.  She reported family history of blood clots in her brother in his 40s?, provoked after a knee surgery. We may consider testing for prothrombin gene mutation, FVL, protein S, C, and antithrombin deficiency.   -Cancer screening: up to date with mammogram (10/2023), overdue colonoscopy, last PAP?

## 2024-03-22 DIAGNOSIS — R06.09 DOE (DYSPNEA ON EXERTION): ICD-10-CM

## 2024-03-22 DIAGNOSIS — E78.5 HYPERLIPIDEMIA, UNSPECIFIED HYPERLIPIDEMIA TYPE: ICD-10-CM

## 2024-03-22 RX ORDER — SERTRALINE HYDROCHLORIDE 100 MG/1
100 TABLET, FILM COATED ORAL DAILY
Qty: 90 TABLET | Refills: 1 | Status: SHIPPED | OUTPATIENT
Start: 2024-03-22

## 2024-03-22 RX ORDER — ATORVASTATIN CALCIUM 40 MG/1
40 TABLET, FILM COATED ORAL DAILY
Qty: 90 TABLET | Refills: 3 | Status: SHIPPED | OUTPATIENT
Start: 2024-03-22

## 2024-03-22 NOTE — TELEPHONE ENCOUNTER
Requested Prescriptions     Pending Prescriptions Disp Refills    atorvastatin (LIPITOR) 40 MG tablet 90 tablet 3     Sig: Take 1 tablet by mouth daily          Number: 90    Refills: 3    Last Office Visit: 07/27/2023    Next Office Visit: 07/25/2024    Last Refill: 10/16/2023    Last Labs: 05/22/2023 Hemoglobin A1C/ Lipid/ TSH    Please fill in 's Absence

## 2024-04-02 RX ORDER — SEMAGLUTIDE 1.34 MG/ML
0.25 INJECTION, SOLUTION SUBCUTANEOUS WEEKLY
Qty: 1 ADJUSTABLE DOSE PRE-FILLED PEN SYRINGE | Refills: 1 | Status: SHIPPED | OUTPATIENT
Start: 2024-04-02 | End: 2024-05-02

## 2024-04-06 DIAGNOSIS — F41.9 ANXIETY: ICD-10-CM

## 2024-04-08 RX ORDER — LORAZEPAM 0.5 MG/1
TABLET ORAL
Qty: 30 TABLET | Refills: 0 | Status: SHIPPED | OUTPATIENT
Start: 2024-04-08 | End: 2024-05-08

## 2024-05-08 ENCOUNTER — OFFICE VISIT (OUTPATIENT)
Dept: INTERNAL MEDICINE CLINIC | Age: 64
End: 2024-05-08
Payer: COMMERCIAL

## 2024-05-08 VITALS
SYSTOLIC BLOOD PRESSURE: 130 MMHG | BODY MASS INDEX: 41.09 KG/M2 | HEART RATE: 84 BPM | WEIGHT: 237.5 LBS | DIASTOLIC BLOOD PRESSURE: 82 MMHG | TEMPERATURE: 97.1 F | OXYGEN SATURATION: 94 %

## 2024-05-08 DIAGNOSIS — I82.4Y2 ACUTE DEEP VEIN THROMBOSIS (DVT) OF PROXIMAL VEIN OF LEFT LOWER EXTREMITY (HCC): ICD-10-CM

## 2024-05-08 DIAGNOSIS — M25.512 ACUTE PAIN OF BOTH SHOULDERS: Primary | ICD-10-CM

## 2024-05-08 DIAGNOSIS — M25.511 ACUTE PAIN OF BOTH SHOULDERS: Primary | ICD-10-CM

## 2024-05-08 DIAGNOSIS — J30.9 ALLERGIC RHINITIS, UNSPECIFIED SEASONALITY, UNSPECIFIED TRIGGER: ICD-10-CM

## 2024-05-08 PROCEDURE — G8417 CALC BMI ABV UP PARAM F/U: HCPCS | Performed by: STUDENT IN AN ORGANIZED HEALTH CARE EDUCATION/TRAINING PROGRAM

## 2024-05-08 PROCEDURE — 3017F COLORECTAL CA SCREEN DOC REV: CPT | Performed by: STUDENT IN AN ORGANIZED HEALTH CARE EDUCATION/TRAINING PROGRAM

## 2024-05-08 PROCEDURE — 99213 OFFICE O/P EST LOW 20 MIN: CPT | Performed by: STUDENT IN AN ORGANIZED HEALTH CARE EDUCATION/TRAINING PROGRAM

## 2024-05-08 PROCEDURE — 1036F TOBACCO NON-USER: CPT | Performed by: STUDENT IN AN ORGANIZED HEALTH CARE EDUCATION/TRAINING PROGRAM

## 2024-05-08 PROCEDURE — G8427 DOCREV CUR MEDS BY ELIG CLIN: HCPCS | Performed by: STUDENT IN AN ORGANIZED HEALTH CARE EDUCATION/TRAINING PROGRAM

## 2024-05-08 RX ORDER — TRIAMCINOLONE ACETONIDE 55 UG/1
2 SPRAY, METERED NASAL DAILY
Qty: 1 EACH | Refills: 5 | Status: SHIPPED | OUTPATIENT
Start: 2024-05-08

## 2024-05-08 NOTE — ASSESSMENT & PLAN NOTE
Can complete Eliquis at end of June.  We also discussed about obtaining labs to check for any underlying hypercoag due to her family history. Her brother w blood clots in his 40s (after a knee surgery).

## 2024-05-08 NOTE — ASSESSMENT & PLAN NOTE
AC joint bursitis, bilaterally. Most likely related to overuse. Discussed reduce activities, activity as tolerated.   Start home exercise. Can try ice, heat prn.

## 2024-05-08 NOTE — PROGRESS NOTES
Debby Mata (:  1960) is a 64 y.o. female here for evaluation of the following chief complaint(s):  Shoulder Pain (Bilateral shoulder pains x 1 month ) and Medication Check (Discuss Eliquis )      Assessment & Plan   ASSESSMENT/PLAN:  1. Acute pain of both shoulders  Assessment & Plan:  AC joint bursitis, bilaterally. Most likely related to overuse. Discussed reduce activities, activity as tolerated.   Start home exercise. Can try ice, heat prn.   2. Allergic rhinitis, unspecified seasonality, unspecified trigger  -     triamcinolone (NASACORT ALLERGY 24HR) 55 MCG/ACT nasal inhaler; 2 sprays by Each Nostril route daily, Disp-1 each, R-5Normal  3. Acute deep vein thrombosis (DVT) of proximal vein of left lower extremity (HCC)  Assessment & Plan:  Can complete Eliquis at end of .  We also discussed about obtaining labs to check for any underlying hypercoag due to her family history. Her brother w blood clots in his 40s (after a knee surgery).         Return in about 3 months (around 2024) for Annual Physical Exam.       Subjective   SUBJECTIVE/OBJECTIVE:  HPI  Debby presented today with acute shoulder pain both sides. Pain at the AC joint, worsen with movement. She has been busy with lots of physical activities. Playing golf, aquatic therapy, arm exercise with dumbbell, yoga.   No joint swelling, pain is not similar to her normal RA pain.   Recently started on Ozempic and seem to do well. Some fatigue on the next day but reports tolerating it well. Notices lost a couple of pounds.  She also inquired about testing for BRCA genes as she as half Ashkenazi Jews. She reports that her daughter had it tested, was negative. Her daughter does not have any breast complaints or breast findings. She repots a history of breast cancer in her aunt and cousin.  Debby's last mammogram was in 10/2023, normal. Her risk score was 5.7%.   Given her history, I am not entirely sure if she is benefit with testing. Will see

## 2024-05-24 LAB — MAMMOGRAPHY, EXTERNAL: NORMAL

## 2024-06-24 RX ORDER — SEMAGLUTIDE 0.68 MG/ML
0.5 INJECTION, SOLUTION SUBCUTANEOUS WEEKLY
Qty: 2 ML | Refills: 1 | Status: SHIPPED | OUTPATIENT
Start: 2024-06-24 | End: 2024-06-27 | Stop reason: SDUPTHER

## 2024-06-27 RX ORDER — SEMAGLUTIDE 0.68 MG/ML
0.5 INJECTION, SOLUTION SUBCUTANEOUS WEEKLY
Qty: 2 ML | Refills: 0 | Status: SHIPPED | OUTPATIENT
Start: 2024-06-27 | End: 2024-07-25

## 2024-06-27 NOTE — TELEPHONE ENCOUNTER
Pt wanting Ozempic called into [facility] for her one month supply at [facility] month supply. Pt spoke with [employer] and they do have it in stock but it goes quickly     [facility]/pharmacy #[id number] - [place], [place] - [address] - P [phone] - F [phone]

## 2024-07-08 DIAGNOSIS — M25.559 HIP PAIN, UNSPECIFIED LATERALITY: Primary | ICD-10-CM

## 2024-07-23 RX ORDER — SEMAGLUTIDE 1.34 MG/ML
1 INJECTION, SOLUTION SUBCUTANEOUS WEEKLY
Qty: 3 ML | Refills: 3 | Status: SHIPPED | OUTPATIENT
Start: 2024-07-23 | End: 2024-07-25

## 2024-07-25 RX ORDER — SEMAGLUTIDE 1.34 MG/ML
1 INJECTION, SOLUTION SUBCUTANEOUS WEEKLY
Qty: 3 ADJUSTABLE DOSE PRE-FILLED PEN SYRINGE | Refills: 3 | Status: SHIPPED | OUTPATIENT
Start: 2024-07-25 | End: 2024-08-24

## 2024-07-29 ENCOUNTER — OFFICE VISIT (OUTPATIENT)
Dept: INTERNAL MEDICINE CLINIC | Age: 64
End: 2024-07-29
Payer: COMMERCIAL

## 2024-07-29 VITALS
HEIGHT: 64 IN | DIASTOLIC BLOOD PRESSURE: 80 MMHG | BODY MASS INDEX: 39.95 KG/M2 | HEART RATE: 80 BPM | WEIGHT: 234 LBS | TEMPERATURE: 97.5 F | SYSTOLIC BLOOD PRESSURE: 122 MMHG | OXYGEN SATURATION: 96 %

## 2024-07-29 DIAGNOSIS — Z00.00 ENCOUNTER FOR WELL ADULT EXAM WITHOUT ABNORMAL FINDINGS: Primary | ICD-10-CM

## 2024-07-29 DIAGNOSIS — E78.00 PURE HYPERCHOLESTEROLEMIA: ICD-10-CM

## 2024-07-29 DIAGNOSIS — F41.9 ANXIETY: ICD-10-CM

## 2024-07-29 DIAGNOSIS — E66.09 CLASS 2 OBESITY DUE TO EXCESS CALORIES WITHOUT SERIOUS COMORBIDITY WITH BODY MASS INDEX (BMI) OF 36.0 TO 36.9 IN ADULT: ICD-10-CM

## 2024-07-29 PROBLEM — Z86.718 HISTORY OF DVT (DEEP VEIN THROMBOSIS): Status: ACTIVE | Noted: 2024-03-08

## 2024-07-29 PROCEDURE — 99396 PREV VISIT EST AGE 40-64: CPT | Performed by: STUDENT IN AN ORGANIZED HEALTH CARE EDUCATION/TRAINING PROGRAM

## 2024-07-29 RX ORDER — LORAZEPAM 0.5 MG/1
0.5 TABLET ORAL DAILY PRN
Qty: 30 TABLET | Refills: 0 | Status: SHIPPED | OUTPATIENT
Start: 2024-07-29 | End: 2024-10-27

## 2024-07-29 SDOH — ECONOMIC STABILITY: FOOD INSECURITY: WITHIN THE PAST 12 MONTHS, YOU WORRIED THAT YOUR FOOD WOULD RUN OUT BEFORE YOU GOT MONEY TO BUY MORE.: NEVER TRUE

## 2024-07-29 SDOH — ECONOMIC STABILITY: FOOD INSECURITY: WITHIN THE PAST 12 MONTHS, THE FOOD YOU BOUGHT JUST DIDN'T LAST AND YOU DIDN'T HAVE MONEY TO GET MORE.: NEVER TRUE

## 2024-07-29 SDOH — ECONOMIC STABILITY: INCOME INSECURITY: HOW HARD IS IT FOR YOU TO PAY FOR THE VERY BASICS LIKE FOOD, HOUSING, MEDICAL CARE, AND HEATING?: NOT HARD AT ALL

## 2024-07-29 NOTE — ASSESSMENT & PLAN NOTE
Within normal limits for age- cont to work ,immunizations up to date, no depression ,no cognitive impairment  Colonoscopy up to date  Eye exam up to date  Dental exam 3-4 times yearly   Exercises as tolerated  -   Findings and recommendations discussed with Pt

## 2024-07-29 NOTE — PROGRESS NOTES
Debby Mata (:  1960) is a 64 y.o. female here for evaluation of the following chief complaint(s):  Annual Exam (Labs needed, refill needed, ativan dose concerns want to increase)      Assessment & Plan   ASSESSMENT/PLAN:  1. Encounter for well adult exam without abnormal findings  Assessment & Plan:  Within normal limits for age- cont to work ,immunizations up to date, no depression ,no cognitive impairment  Colonoscopy up to date  Eye exam up to date  Dental exam 3-4 times yearly   Exercises as tolerated  -   Findings and recommendations discussed with Pt    Orders:  -     Hemoglobin A1C; Future  -     Lipid, Fasting; Future  -     TSH with Reflex; Future  -     Comprehensive Metabolic Panel; Future  -     CBC with Auto Differential; Future  2. Anxiety  Assessment & Plan:   Has been on Ativan.  For many years.  PDMP reviewed.  Discussed with patient about chronic benzodiazepine use.  Refill sent today.  If not under control, will consider SSRI therapy for more appropriate address of her anxiety sx  Orders:  -     LORazepam (ATIVAN) 0.5 MG tablet; Take 1 tablet by mouth daily as needed for Anxiety for up to 90 days. Max Daily Amount: 0.5 mg, Disp-30 tablet, R-0Normal  -     Hemoglobin A1C; Future  -     Lipid, Fasting; Future  -     TSH with Reflex; Future  -     Comprehensive Metabolic Panel; Future  -     CBC with Auto Differential; Future  3. Class 2 obesity due to excess calories without serious comorbidity with body mass index (BMI) of 36.0 to 36.9 in adult  -     Hemoglobin A1C; Future  -     Lipid, Fasting; Future  -     TSH with Reflex; Future  -     Comprehensive Metabolic Panel; Future  -     CBC with Auto Differential; Future  4. Pure hypercholesterolemia  -     Hemoglobin A1C; Future  -     Lipid, Fasting; Future  -     TSH with Reflex; Future  -     Comprehensive Metabolic Panel; Future  -     CBC with Auto Differential; Future      Return in about 4 months (around 2024) for Routine 
   Max glyco 6.6- down to 5.8 2014 6.1 12/2016- for bing 6/2017 and start metformin if still up  Refusing metformin     Insomnia, unspecified     Obesity, unspecified     Other and unspecified hyperlipidemia     Other specified congenital anomaly of skin     Pain in both feet 12/17/2015    S/p fracture 2015     Screening colonoscopy 06/2014    normal-repeat 10 yrs     Unspecified sinusitis (chronic)      Past Surgical History:   Procedure Laterality Date    COLONOSCOPY  07/21/2014    repeat 10 yrs.    HYSTERECTOMY (CERVIX STATUS UNKNOWN)  01/01/2000    for prolapse partial    LIPOMA RESECTION  01/01/2007    left thigh    PARTIAL HYSTERECTOMY (CERVIX NOT REMOVED)       Family History   Problem Relation Age of Onset    Lung Cancer Father     Other Brother         blood clot after knee surgery     Social History     Tobacco Use    Smoking status: Never    Smokeless tobacco: Never   Substance Use Topics    Alcohol use: Yes     Alcohol/week: 0.0 standard drinks of alcohol     Comment: occasionally    Drug use: No           Objective    Vital Signs  /80   Pulse 80   Temp 97.5 °F (36.4 °C)   Ht 1.619 m (5' 3.75\")   Wt 106.1 kg (234 lb)   LMP  (LMP Unknown)   SpO2 96%   BMI 40.48 kg/m²     Wt Readings from Last 3 Encounters:   07/29/24 106.1 kg (234 lb)   05/08/24 107.7 kg (237 lb 8 oz)   03/08/24 108.9 kg (240 lb)       Physical Exam  Vitals and nursing note reviewed.   Constitutional:       General: She is not in acute distress.     Appearance: Normal appearance. She is well-developed. She is obese. She is not diaphoretic.   HENT:      Head: Normocephalic and atraumatic.   Eyes:      General: No scleral icterus.     Conjunctiva/sclera: Conjunctivae normal.      Pupils: Pupils are equal, round, and reactive to light.   Cardiovascular:      Rate and Rhythm: Normal rate and regular rhythm.      Pulses: Normal pulses.      Heart sounds: Normal heart sounds. No murmur heard.     No friction rub. No gallop.

## 2024-07-29 NOTE — ASSESSMENT & PLAN NOTE
Has been on Ativan.  For many years.  PDMP reviewed.  Discussed with patient about chronic benzodiazepine use.  Refill sent today.  If not under control, will consider SSRI therapy for more appropriate address of her anxiety sx

## 2024-08-05 DIAGNOSIS — Z86.718 HISTORY OF DVT (DEEP VEIN THROMBOSIS): Primary | ICD-10-CM

## 2024-08-05 DIAGNOSIS — Z82.49 FAMILY HISTORY OF DEEP VEIN THROMBOSIS: ICD-10-CM

## 2024-08-08 DIAGNOSIS — F41.9 ANXIETY: ICD-10-CM

## 2024-08-08 DIAGNOSIS — E66.09 CLASS 2 OBESITY DUE TO EXCESS CALORIES WITHOUT SERIOUS COMORBIDITY WITH BODY MASS INDEX (BMI) OF 36.0 TO 36.9 IN ADULT: ICD-10-CM

## 2024-08-08 DIAGNOSIS — Z00.00 ENCOUNTER FOR WELL ADULT EXAM WITHOUT ABNORMAL FINDINGS: ICD-10-CM

## 2024-08-08 DIAGNOSIS — Z82.49 FAMILY HISTORY OF DEEP VEIN THROMBOSIS: ICD-10-CM

## 2024-08-08 DIAGNOSIS — Z86.718 HISTORY OF DVT (DEEP VEIN THROMBOSIS): ICD-10-CM

## 2024-08-08 DIAGNOSIS — E78.00 PURE HYPERCHOLESTEROLEMIA: ICD-10-CM

## 2024-08-08 LAB
ALBUMIN SERPL-MCNC: 4.7 G/DL (ref 3.4–5)
ALBUMIN/GLOB SERPL: 1.8 {RATIO} (ref 1.1–2.2)
ALP SERPL-CCNC: 108 U/L (ref 40–129)
ALT SERPL-CCNC: 59 U/L (ref 10–40)
ANION GAP SERPL CALCULATED.3IONS-SCNC: 13 MMOL/L (ref 3–16)
AST SERPL-CCNC: 38 U/L (ref 15–37)
BASOPHILS # BLD: 0 K/UL (ref 0–0.2)
BASOPHILS NFR BLD: 0.2 %
BILIRUB SERPL-MCNC: 0.6 MG/DL (ref 0–1)
BUN SERPL-MCNC: 15 MG/DL (ref 7–20)
CALCIUM SERPL-MCNC: 9.8 MG/DL (ref 8.3–10.6)
CHLORIDE SERPL-SCNC: 104 MMOL/L (ref 99–110)
CHOLEST SERPL-MCNC: 180 MG/DL (ref 0–199)
CO2 SERPL-SCNC: 22 MMOL/L (ref 21–32)
CREAT SERPL-MCNC: 0.7 MG/DL (ref 0.6–1.2)
DEPRECATED RDW RBC AUTO: 13 % (ref 12.4–15.4)
EOSINOPHIL # BLD: 0.1 K/UL (ref 0–0.6)
EOSINOPHIL NFR BLD: 1 %
EST. AVERAGE GLUCOSE BLD GHB EST-MCNC: 108.3 MG/DL
GFR SERPLBLD CREATININE-BSD FMLA CKD-EPI: >90 ML/MIN/{1.73_M2}
GLUCOSE SERPL-MCNC: 97 MG/DL (ref 70–99)
HBA1C MFR BLD: 5.4 %
HCT VFR BLD AUTO: 42.5 % (ref 36–48)
HDLC SERPL-MCNC: 59 MG/DL (ref 40–60)
HGB BLD-MCNC: 14.4 G/DL (ref 12–16)
LDL CHOLESTEROL: 97 MG/DL
LYMPHOCYTES # BLD: 2.1 K/UL (ref 1–5.1)
LYMPHOCYTES NFR BLD: 31.2 %
MCH RBC QN AUTO: 31 PG (ref 26–34)
MCHC RBC AUTO-ENTMCNC: 33.8 G/DL (ref 31–36)
MCV RBC AUTO: 91.6 FL (ref 80–100)
MONOCYTES # BLD: 0.7 K/UL (ref 0–1.3)
MONOCYTES NFR BLD: 10.2 %
NEUTROPHILS # BLD: 3.8 K/UL (ref 1.7–7.7)
NEUTROPHILS NFR BLD: 57.4 %
PLATELET # BLD AUTO: 199 K/UL (ref 135–450)
PMV BLD AUTO: 11 FL (ref 5–10.5)
POTASSIUM SERPL-SCNC: 4.1 MMOL/L (ref 3.5–5.1)
PROT SERPL-MCNC: 7.3 G/DL (ref 6.4–8.2)
RBC # BLD AUTO: 4.64 M/UL (ref 4–5.2)
SODIUM SERPL-SCNC: 139 MMOL/L (ref 136–145)
TRIGL SERPL-MCNC: 122 MG/DL (ref 0–150)
TSH SERPL DL<=0.005 MIU/L-ACNC: 1.7 UIU/ML (ref 0.27–4.2)
VLDLC SERPL CALC-MCNC: 24 MG/DL
WBC # BLD AUTO: 6.6 K/UL (ref 4–11)

## 2024-08-09 DIAGNOSIS — Z86.718 HISTORY OF DVT (DEEP VEIN THROMBOSIS): Primary | ICD-10-CM

## 2024-08-10 LAB
PROT C ACT/NOR PPP: 146 % (ref 83–168)
PROT S AG ACT/NOR PPP IA: 142 % (ref 63–126)

## 2024-08-12 LAB — AT III ACT/NOR PPP CHRO: 107 % (ref 83–122)

## 2024-08-14 LAB
F2 C.20210G>A GENO BLD/T: NEGATIVE
F5 P.R506Q BLD/T QL: ABNORMAL
SPECIMEN SOURCE: ABNORMAL
SPECIMEN SOURCE: NORMAL

## 2024-08-15 ENCOUNTER — PATIENT MESSAGE (OUTPATIENT)
Dept: INTERNAL MEDICINE CLINIC | Age: 64
End: 2024-08-15

## 2024-08-15 DIAGNOSIS — R60.9 EDEMA, UNSPECIFIED TYPE: ICD-10-CM

## 2024-08-15 DIAGNOSIS — R60.0 BILATERAL LEG EDEMA: ICD-10-CM

## 2024-08-15 DIAGNOSIS — Z86.718 PERSONAL HISTORY OF DVT (DEEP VEIN THROMBOSIS): Primary | ICD-10-CM

## 2024-08-17 DIAGNOSIS — Z86.718 HISTORY OF DVT (DEEP VEIN THROMBOSIS): Primary | ICD-10-CM

## 2024-08-17 DIAGNOSIS — R79.89 ELEVATED LIVER FUNCTION TESTS: ICD-10-CM

## 2024-08-19 ENCOUNTER — HOSPITAL ENCOUNTER (OUTPATIENT)
Dept: PHYSICAL THERAPY | Age: 64
Setting detail: THERAPIES SERIES
Discharge: HOME | End: 2024-08-19
Payer: COMMERCIAL

## 2024-08-19 DIAGNOSIS — M25.552 BILATERAL HIP PAIN: Primary | ICD-10-CM

## 2024-08-19 DIAGNOSIS — M25.551 BILATERAL HIP PAIN: Primary | ICD-10-CM

## 2024-08-19 PROCEDURE — 97110 THERAPEUTIC EXERCISES: CPT

## 2024-08-19 PROCEDURE — 97161 PT EVAL LOW COMPLEX 20 MIN: CPT

## 2024-08-19 NOTE — PLAN OF CARE
[facility] [address], [address], [place] [address] office: [phone] fax: [phone]     Physical Therapy Initial Evaluation Certification      Dear Danni Haywood MD,    We had the pleasure of evaluating the following patient for physical therapy services at [facility].  A summary of our findings can be found in the initial assessment below.  This includes our plan of care.  If you have any questions or concerns regarding these findings, please do not hesitate to contact me at the office phone number listed above.  Thank you for the referral.     Physician Signature:_______________________________Date:__________________  By signing above (or electronic signature), therapist’s plan is approved by physician       Physical Therapy: TREATMENT/PROGRESS NOTE   Patient: Debby Mata (64 y.o. female)   Examination Date: 2024   :  1960 MRN: [id number]   Visit #: 1   Insurance Allowable Auth Needed   BMN []Yes    [x]No    Insurance: Payor: [employer] / Plan: [employer] - CHOICE PLU / Product Type: *No Product type* /   Insurance ID: [id number] - (Commercial)  Secondary Insurance (if applicable):    Treatment Diagnosis:     ICD-10-CM    1. Bilateral hip pain  M25.551     M25.552          Medical Diagnosis:  Hip pain, unspecified laterality [M25.559]   Referring Physician: Danni Haywood MD  PCP: Danni Haywood MD     Plan of care signed (Y/N): N    Date of Patient follow up with Physician:      Plan of Care Report: EVAL today  POC update due: (10 visits /OR AUTH LIMITS, whichever is less) 2024                                             Medical History:  Comorbidities:  Osteoarthritis  Rheumatoid Arthritis  Anxiety  Depression  Relevant Medical History: None                                         Precautions/ Contra-indications:           Latex allergy:  NO  Pacemaker:

## 2024-08-23 ENCOUNTER — HOSPITAL ENCOUNTER (OUTPATIENT)
Dept: VASCULAR LAB | Age: 64
End: 2024-08-23
Payer: COMMERCIAL

## 2024-08-23 ENCOUNTER — HOSPITAL ENCOUNTER (OUTPATIENT)
Dept: VASCULAR LAB | Age: 64
Discharge: HOME | End: 2024-08-23
Payer: COMMERCIAL

## 2024-08-23 ENCOUNTER — APPOINTMENT (OUTPATIENT)
Dept: PHYSICAL THERAPY | Age: 64
End: 2024-08-23
Payer: COMMERCIAL

## 2024-08-23 DIAGNOSIS — Z86.718 HISTORY OF DVT (DEEP VEIN THROMBOSIS): ICD-10-CM

## 2024-08-23 DIAGNOSIS — Z86.718 PERSONAL HISTORY OF DVT (DEEP VEIN THROMBOSIS): ICD-10-CM

## 2024-08-23 PROCEDURE — 93970 EXTREMITY STUDY: CPT

## 2024-08-25 DIAGNOSIS — J40 BRONCHITIS: Primary | ICD-10-CM

## 2024-08-25 PROCEDURE — 93970 EXTREMITY STUDY: CPT | Performed by: SURGERY

## 2024-08-25 RX ORDER — PROMETHAZINE HYDROCHLORIDE AND CODEINE PHOSPHATE 6.25; 1 MG/5ML; MG/5ML
5 SYRUP ORAL EVERY 4 HOURS PRN
Qty: 118 ML | Refills: 0 | Status: SHIPPED | OUTPATIENT
Start: 2024-08-25 | End: 2024-08-29

## 2024-08-27 ENCOUNTER — TELEPHONE (OUTPATIENT)
Dept: INTERNAL MEDICINE CLINIC | Age: 64
End: 2024-08-27

## 2024-08-27 ENCOUNTER — APPOINTMENT (OUTPATIENT)
Dept: PHYSICAL THERAPY | Age: 64
End: 2024-08-27
Payer: COMMERCIAL

## 2024-08-27 NOTE — TELEPHONE ENCOUNTER
Brittny state they no longer make the Promethazine-Codeine and are asking for Robitussin MD or some thing similar    Pls call and advise              [id number] - [place], [place] - [address] - P [phone] - F [phone]

## 2024-08-27 NOTE — TELEPHONE ENCOUNTER
Spoke with patient this matter was taken care of.  COUGH SYRUP WAS SENT TO [facility] FROM DR. JEFFRIES

## 2024-08-27 NOTE — TELEPHONE ENCOUNTER
I would advise the patient to use over-the-counter cough syrup.  Several formulations are available.

## 2024-08-28 PROBLEM — Z00.00 ENCOUNTER FOR WELL ADULT EXAM WITHOUT ABNORMAL FINDINGS: Status: RESOLVED | Noted: 2024-07-29 | Resolved: 2024-08-28

## 2024-08-29 ENCOUNTER — APPOINTMENT (OUTPATIENT)
Dept: PHYSICAL THERAPY | Age: 64
End: 2024-08-29
Payer: COMMERCIAL

## 2024-10-17 RX ORDER — SERTRALINE HYDROCHLORIDE 100 MG/1
100 TABLET, FILM COATED ORAL DAILY
Qty: 90 TABLET | Refills: 1 | Status: SHIPPED | OUTPATIENT
Start: 2024-10-17

## 2024-10-23 PROBLEM — M19.90 IDIOPATHIC OSTEOARTHRITIS: Status: ACTIVE | Noted: 2023-08-14

## 2024-10-23 PROBLEM — D68.51 HETEROZYGOUS FACTOR V LEIDEN MUTATION (HCC): Status: ACTIVE | Noted: 2024-10-23

## 2024-10-23 PROBLEM — I82.409 DEEP VEIN THROMBOSIS (DVT) OF LOWER EXTREMITY (HCC): Status: ACTIVE | Noted: 2024-10-23

## 2024-10-24 ENCOUNTER — OFFICE VISIT (OUTPATIENT)
Dept: VASCULAR SURGERY | Age: 64
End: 2024-10-24
Payer: COMMERCIAL

## 2024-10-24 VITALS — DIASTOLIC BLOOD PRESSURE: 89 MMHG | HEART RATE: 99 BPM | SYSTOLIC BLOOD PRESSURE: 134 MMHG

## 2024-10-24 DIAGNOSIS — R60.9 EDEMA: Primary | ICD-10-CM

## 2024-10-24 DIAGNOSIS — I87.323 IDIOPATHIC CHRONIC VENOUS HYPERTENSION OF BOTH LEGS WITH INFLAMMATION: Primary | ICD-10-CM

## 2024-10-24 PROCEDURE — 99203 OFFICE O/P NEW LOW 30 MIN: CPT | Performed by: SURGERY

## 2024-10-24 PROCEDURE — G8427 DOCREV CUR MEDS BY ELIG CLIN: HCPCS | Performed by: SURGERY

## 2024-10-24 PROCEDURE — G8484 FLU IMMUNIZE NO ADMIN: HCPCS | Performed by: SURGERY

## 2024-10-24 PROCEDURE — 3017F COLORECTAL CA SCREEN DOC REV: CPT | Performed by: SURGERY

## 2024-10-24 PROCEDURE — 1036F TOBACCO NON-USER: CPT | Performed by: SURGERY

## 2024-10-24 PROCEDURE — G8417 CALC BMI ABV UP PARAM F/U: HCPCS | Performed by: SURGERY

## 2024-10-24 NOTE — PROGRESS NOTES
[facility]  Kirstin Hall MD, FACS, [facility]  [phone]    OUTPATIENT CONSULTATION          Date of Consultation:  10/24/2024    PCP:  Danni Haywood MD       Chief Complaint: Leg swelling    History of Present Illness:   Debby Mata is a 64 y.o. female who presents today for evaluation of leg swelling.  Reports a history of SVT in the past.    She reports painful varicosities particularly in the left leg.  She has painful discoloration on the left medial malleolus.  Reports some weeping in the past.  She has had intermittent low-grade compression.  No claudication or rest pain.  No other wounds.  She now presents for further evaluation and management as indicated.    I personally reviewed images of the following study:  Vascular US Duplex Lower Extremity Venous Bilateral 8/23/2024     PastMedical History:  Past Medical History:   Diagnosis Date    Allergic rhinitis, cause unspecified     chronic    Anxiety state, unspecified     Arthritis     Blepharophimosis     Contact dermatitis and other eczema, due to unspecified cause     Depressive disorder, not elsewhere classified     Headache(784.0)     Hearing loss     Hyperglycemia 12/12/2014    Max glyco 6.6- down to 5.8 2014 6.1 12/2016- for bing 6/2017 and start metformin if still up  Refusing metformin     Insomnia, unspecified     Obesity, unspecified     Other and unspecified hyperlipidemia     Other specified congenital anomaly of skin     Pain in both feet 12/17/2015    S/p fracture 2015     Screening colonoscopy 06/2014    normal-repeat 10 yrs     Unspecified sinusitis (chronic)      Past Surgical History:   Past Surgical History:   Procedure Laterality Date    COLONOSCOPY  07/21/2014    repeat 10 yrs.    HYSTERECTOMY (CERVIX STATUS UNKNOWN)  01/01/2000    for prolapse partial    LIPOMA RESECTION  01/01/2007    left thigh    PARTIAL HYSTERECTOMY (CERVIX NOT REMOVED)       Home Medications:   Prior to Admission medications

## 2024-10-30 RX ORDER — TIRZEPATIDE 10 MG/.5ML
INJECTION, SOLUTION SUBCUTANEOUS
Status: CANCELLED | OUTPATIENT
Start: 2024-10-30

## 2024-11-05 DIAGNOSIS — E78.5 HYPERLIPIDEMIA, UNSPECIFIED HYPERLIPIDEMIA TYPE: ICD-10-CM

## 2024-11-05 DIAGNOSIS — R06.09 DOE (DYSPNEA ON EXERTION): ICD-10-CM

## 2024-11-05 RX ORDER — ASPIRIN 81 MG/1
81 TABLET, COATED ORAL DAILY
Qty: 90 TABLET | Refills: 3 | OUTPATIENT
Start: 2024-11-05

## 2024-11-08 PROBLEM — I87.323 IDIOPATHIC CHRONIC VENOUS HYPERTENSION OF BOTH LEGS WITH INFLAMMATION: Status: ACTIVE | Noted: 2024-11-08

## 2024-11-25 DIAGNOSIS — R06.09 DOE (DYSPNEA ON EXERTION): ICD-10-CM

## 2024-11-25 DIAGNOSIS — E78.5 HYPERLIPIDEMIA, UNSPECIFIED HYPERLIPIDEMIA TYPE: ICD-10-CM

## 2024-11-25 RX ORDER — ATORVASTATIN CALCIUM 40 MG/1
40 TABLET, FILM COATED ORAL DAILY
Qty: 90 TABLET | Refills: 1 | Status: SHIPPED | OUTPATIENT
Start: 2024-11-25

## 2024-12-09 ENCOUNTER — TELEPHONE (OUTPATIENT)
Dept: VASCULAR SURGERY | Age: 64
End: 2024-12-09

## 2024-12-09 NOTE — TELEPHONE ENCOUNTER
Patient called wanting to know the results of the scan she had done last week. Please call her back [phone]

## 2024-12-09 NOTE — TELEPHONE ENCOUNTER
NISHI for Ms. Mata informing her scan showed she has venous reflux and that if she wanted to discuss surgery then we can schedule her appt to see Dr. Hall.

## 2024-12-10 DIAGNOSIS — E66.09 CLASS 2 OBESITY DUE TO EXCESS CALORIES WITHOUT SERIOUS COMORBIDITY WITH BODY MASS INDEX (BMI) OF 36.0 TO 36.9 IN ADULT: Primary | ICD-10-CM

## 2024-12-10 DIAGNOSIS — E66.812 CLASS 2 OBESITY DUE TO EXCESS CALORIES WITHOUT SERIOUS COMORBIDITY WITH BODY MASS INDEX (BMI) OF 36.0 TO 36.9 IN ADULT: Primary | ICD-10-CM

## 2024-12-10 RX ORDER — SEMAGLUTIDE 2.68 MG/ML
2 INJECTION, SOLUTION SUBCUTANEOUS
Qty: 9 ML | Refills: 1 | Status: SHIPPED | OUTPATIENT
Start: 2024-12-10

## 2025-01-07 ENCOUNTER — TELEMEDICINE (OUTPATIENT)
Dept: INTERNAL MEDICINE CLINIC | Age: 65
End: 2025-01-07

## 2025-01-07 DIAGNOSIS — E11.9 TYPE 2 DIABETES MELLITUS WITHOUT COMPLICATION, WITHOUT LONG-TERM CURRENT USE OF INSULIN (HCC): ICD-10-CM

## 2025-01-07 DIAGNOSIS — E66.812 CLASS 2 OBESITY DUE TO EXCESS CALORIES WITHOUT SERIOUS COMORBIDITY WITH BODY MASS INDEX (BMI) OF 36.0 TO 36.9 IN ADULT: Primary | ICD-10-CM

## 2025-01-07 DIAGNOSIS — E66.09 CLASS 2 OBESITY DUE TO EXCESS CALORIES WITHOUT SERIOUS COMORBIDITY WITH BODY MASS INDEX (BMI) OF 36.0 TO 36.9 IN ADULT: Primary | ICD-10-CM

## 2025-01-07 RX ORDER — ASPIRIN 81 MG/1
81 TABLET ORAL DAILY
COMMUNITY

## 2025-01-07 ASSESSMENT — ENCOUNTER SYMPTOMS
ABDOMINAL DISTENTION: 0
CONSTIPATION: 0
WHEEZING: 0
CHEST TIGHTNESS: 0
COUGH: 0
ABDOMINAL PAIN: 0
SHORTNESS OF BREATH: 0
VOMITING: 0
PHOTOPHOBIA: 0
APNEA: 0
DIARRHEA: 0
VOICE CHANGE: 0
TROUBLE SWALLOWING: 0
CHOKING: 0
NAUSEA: 0
STRIDOR: 0

## 2025-01-07 ASSESSMENT — PATIENT HEALTH QUESTIONNAIRE - PHQ9
2. FEELING DOWN, DEPRESSED OR HOPELESS: NOT AT ALL
SUM OF ALL RESPONSES TO PHQ QUESTIONS 1-9: 0
SUM OF ALL RESPONSES TO PHQ9 QUESTIONS 1 & 2: 0
SUM OF ALL RESPONSES TO PHQ QUESTIONS 1-9: 0
1. LITTLE INTEREST OR PLEASURE IN DOING THINGS: NOT AT ALL

## 2025-01-07 NOTE — PROGRESS NOTES
Debby Mata, was evaluated through a synchronous (real-time) audio-video encounter. The patient (or guardian if applicable) is aware that this is a billable service, which includes applicable co-pays. This Virtual Visit was conducted with patient's (and/or legal guardian's) consent. Patient identification was verified, and a caregiver was present when appropriate.   The patient was located at Home: [address] [place] [id number]  Provider was located at Home (Appt Dept State): [place]  Confirm you are appropriately licensed, registered, or certified to deliver care in the state where the patient is located as indicated above. If you are not or unsure, please re-schedule the visit: Yes, I confirm.     Debby Mata (:  1960) is a Established patient, presenting virtually for evaluation of the following:      Below is the assessment and plan developed based on review of pertinent history, physical exam, labs, studies, and medications.     Assessment & Plan  Class 2 obesity due to excess calories without serious comorbidity with body mass index (BMI) of 36.0 to 36.9 in adult  Patient was asked about her current diet and exercise habits, and personalized advice was provided regarding recommended lifestyle changes.  Patient's comorbid health conditions associated with elevated BMI were discussed, including diabetes, dyslipidemia, and hypertension, as well as the likely benefits of weight loss.  Based upon patient's motivation to change her behavior, the following plan was agreed upon to work toward a weight loss goal of 20-30 pounds: increase physical activity as follows: increase cardio, exercise for at least 30 minutes 4-5 days per week, and medication prescribed: Ozempic . Educational materials for  weight loss were provided.  Patient will follow-up in 3 month(s) with PCP.  Provider spent 20 minutes counseling patient.    She is currently on Ozempic. Increase dose to 2 mg. Will do for 3 months. If no

## 2025-01-07 NOTE — ASSESSMENT & PLAN NOTE
Discussed history   Dx in 2012 but significantly improved   Now A1C much better   On Ozempic for weight loss as above

## 2025-01-07 NOTE — ASSESSMENT & PLAN NOTE
Patient was asked about her current diet and exercise habits, and personalized advice was provided regarding recommended lifestyle changes.  Patient's comorbid health conditions associated with elevated BMI were discussed, including diabetes, dyslipidemia, and hypertension, as well as the likely benefits of weight loss.  Based upon patient's motivation to change her behavior, the following plan was agreed upon to work toward a weight loss goal of 20-30 pounds: increase physical activity as follows: increase cardio, exercise for at least 30 minutes 4-5 days per week, and medication prescribed: Ozempic. Educational materials for  weight loss were provided.  Patient will follow-up in 3 month(s) with PCP.  Provider spent 20 minutes counseling patient.    She is currently on Ozempic. Increase dose to 2 mg. Will do for 3 months. If no further weight loss then will consider switching to Mounjaro.   Discussed healthy diet and exercise. She has started working with a weight loss  (program through work)

## 2025-03-23 ENCOUNTER — PATIENT MESSAGE (OUTPATIENT)
Dept: INTERNAL MEDICINE CLINIC | Age: 65
End: 2025-03-23

## 2025-03-25 DIAGNOSIS — E11.9 TYPE 2 DIABETES MELLITUS WITHOUT COMPLICATION, WITHOUT LONG-TERM CURRENT USE OF INSULIN: Primary | ICD-10-CM

## 2025-03-25 DIAGNOSIS — Z71.84 COUNSELING ABOUT TRAVEL: ICD-10-CM

## 2025-03-25 RX ORDER — SCOPOLAMINE 1 MG/3D
1 PATCH, EXTENDED RELEASE TRANSDERMAL
Qty: 3 PATCH | Refills: 0 | Status: SHIPPED | OUTPATIENT
Start: 2025-03-25

## 2025-03-25 RX ORDER — SCOPOLAMINE 1 MG/3D
1 PATCH, EXTENDED RELEASE TRANSDERMAL
Qty: 3 PATCH | Refills: 0 | Status: SHIPPED | OUTPATIENT
Start: 2025-03-25 | End: 2025-03-25

## 2025-03-25 RX ORDER — CIPROFLOXACIN 250 MG/1
250 TABLET, FILM COATED ORAL 2 TIMES DAILY
Qty: 6 TABLET | Refills: 0 | Status: SHIPPED | OUTPATIENT
Start: 2025-03-25 | End: 2025-03-25

## 2025-03-25 RX ORDER — CIPROFLOXACIN 250 MG/1
250 TABLET, FILM COATED ORAL 2 TIMES DAILY
Qty: 6 TABLET | Refills: 0 | Status: SHIPPED | OUTPATIENT
Start: 2025-03-25 | End: 2025-03-28

## 2025-03-28 ENCOUNTER — TELEPHONE (OUTPATIENT)
Dept: INTERNAL MEDICINE CLINIC | Age: 65
End: 2025-03-28

## 2025-03-28 DIAGNOSIS — E11.9 TYPE 2 DIABETES MELLITUS WITHOUT COMPLICATION, WITHOUT LONG-TERM CURRENT USE OF INSULIN: ICD-10-CM

## 2025-03-28 NOTE — TELEPHONE ENCOUNTER
Submitted PA for Mounjaro 7.5MG/0.5ML auto-injectors  Via CMM Key: [id number] STATUS: APPROVED, Case Id:[id number]   Coverage Start Date:02/26/2025  Coverage End Date:03/28/2026    If this requires a response please respond to the pool ( P [facility] MEDICATION PRE-AUTH).      Thank you please advise patient.

## 2025-03-31 NOTE — TELEPHONE ENCOUNTER
I already responded to her goAct message.   Can come in for a nurse visit for Tdap and Hepatitis A.  Lab was already ordered - just need to go to lab

## 2025-03-31 NOTE — TELEPHONE ENCOUNTER
Medication:   Requested Prescriptions      No prescriptions requested or ordered in this encounter     Last Filled:  [facility]    Last appt: 1/7/2025   Next appt: 5/1/2025    Last Labs DM:   Lab Results   Component Value Date/Time    LABA1C 5.4 08/08/2024 11:35 AM

## 2025-03-31 NOTE — TELEPHONE ENCOUNTER
Pt is requesting an order for her measles titer. Also the patient mentioned that she needs vaccines. [place] is leaving [place]. What vaccines does she need and can she get the vaccines with a nurse visit?

## 2025-04-01 ENCOUNTER — TELEPHONE (OUTPATIENT)
Dept: VASCULAR SURGERY | Age: 65
End: 2025-04-01

## 2025-04-01 NOTE — TELEPHONE ENCOUNTER
-Pt requesting Advise for new Symptoms for 2 weeks of: Both legs pain, tender to touch, no discoloration, no swelling.  -Pt hasn't had any recent imaging done with this new issue.    11/12/24 with Dr. Hall  Vascular US Reflux Rogelio Insufficiency Study Bilateral

## 2025-04-02 DIAGNOSIS — I87.2 VENOUS INSUFFICIENCY: ICD-10-CM

## 2025-04-02 DIAGNOSIS — Z86.718 HISTORY OF DVT (DEEP VEIN THROMBOSIS): Primary | ICD-10-CM

## 2025-04-02 NOTE — TELEPHONE ENCOUNTER
Spoke with Ms. Mata, we have her scheduled for 4/4 at 130p for reflux scan and appt with Dr. Hall on 4/10 6650a.

## 2025-04-03 DIAGNOSIS — E11.9 TYPE 2 DIABETES MELLITUS WITHOUT COMPLICATION, WITHOUT LONG-TERM CURRENT USE OF INSULIN: Primary | ICD-10-CM

## 2025-04-08 DIAGNOSIS — Z71.84 COUNSELING ABOUT TRAVEL: ICD-10-CM

## 2025-04-09 ENCOUNTER — RESULTS FOLLOW-UP (OUTPATIENT)
Dept: INTERNAL MEDICINE CLINIC | Age: 65
End: 2025-04-09

## 2025-04-09 PROBLEM — K58.0 IRRITABLE BOWEL SYNDROME WITH DIARRHEA: Status: RESOLVED | Noted: 2018-12-27 | Resolved: 2025-04-09

## 2025-04-09 PROBLEM — R06.09 DOE (DYSPNEA ON EXERTION): Status: RESOLVED | Noted: 2023-07-27 | Resolved: 2025-04-09

## 2025-04-09 LAB
MEV IGG SER QL IA: NORMAL
MUV IGG SER QL IA: NORMAL
RUBV IGG SERPL QL IA: NORMAL
VZV IGG SER QL IA: NORMAL

## 2025-04-10 ENCOUNTER — OFFICE VISIT (OUTPATIENT)
Dept: VASCULAR SURGERY | Age: 65
End: 2025-04-10
Payer: COMMERCIAL

## 2025-04-10 ENCOUNTER — LAB (OUTPATIENT)
Dept: INTERNAL MEDICINE CLINIC | Age: 65
End: 2025-04-10
Payer: COMMERCIAL

## 2025-04-10 ENCOUNTER — PATIENT MESSAGE (OUTPATIENT)
Dept: INTERNAL MEDICINE CLINIC | Age: 65
End: 2025-04-10

## 2025-04-10 VITALS
BODY MASS INDEX: 40.48 KG/M2 | DIASTOLIC BLOOD PRESSURE: 90 MMHG | TEMPERATURE: 97 F | OXYGEN SATURATION: 94 % | HEIGHT: 64 IN | SYSTOLIC BLOOD PRESSURE: 140 MMHG | HEART RATE: 90 BPM

## 2025-04-10 DIAGNOSIS — E66.09 CLASS 2 OBESITY DUE TO EXCESS CALORIES WITHOUT SERIOUS COMORBIDITY WITH BODY MASS INDEX (BMI) OF 36.0 TO 36.9 IN ADULT: ICD-10-CM

## 2025-04-10 DIAGNOSIS — Z23 NEED FOR HEPATITIS A VACCINATION: Primary | ICD-10-CM

## 2025-04-10 DIAGNOSIS — E66.812 CLASS 2 OBESITY DUE TO EXCESS CALORIES WITHOUT SERIOUS COMORBIDITY WITH BODY MASS INDEX (BMI) OF 36.0 TO 36.9 IN ADULT: ICD-10-CM

## 2025-04-10 DIAGNOSIS — Z23 NEED FOR TDAP VACCINATION: ICD-10-CM

## 2025-04-10 DIAGNOSIS — I80.02 THROMBOPHLEBITIS OF SUPERFICIAL VEINS OF LEFT LOWER EXTREMITY: ICD-10-CM

## 2025-04-10 DIAGNOSIS — F41.9 ANXIETY: ICD-10-CM

## 2025-04-10 DIAGNOSIS — I87.323 IDIOPATHIC CHRONIC VENOUS HYPERTENSION OF BOTH LEGS WITH INFLAMMATION: Primary | ICD-10-CM

## 2025-04-10 PROCEDURE — 90632 HEPA VACCINE ADULT IM: CPT | Performed by: STUDENT IN AN ORGANIZED HEALTH CARE EDUCATION/TRAINING PROGRAM

## 2025-04-10 PROCEDURE — 99214 OFFICE O/P EST MOD 30 MIN: CPT | Performed by: SURGERY

## 2025-04-10 PROCEDURE — 90471 IMMUNIZATION ADMIN: CPT | Performed by: STUDENT IN AN ORGANIZED HEALTH CARE EDUCATION/TRAINING PROGRAM

## 2025-04-10 PROCEDURE — 1036F TOBACCO NON-USER: CPT | Performed by: SURGERY

## 2025-04-10 PROCEDURE — G8417 CALC BMI ABV UP PARAM F/U: HCPCS | Performed by: SURGERY

## 2025-04-10 PROCEDURE — 90472 IMMUNIZATION ADMIN EACH ADD: CPT | Performed by: STUDENT IN AN ORGANIZED HEALTH CARE EDUCATION/TRAINING PROGRAM

## 2025-04-10 PROCEDURE — 90715 TDAP VACCINE 7 YRS/> IM: CPT | Performed by: STUDENT IN AN ORGANIZED HEALTH CARE EDUCATION/TRAINING PROGRAM

## 2025-04-10 PROCEDURE — 3017F COLORECTAL CA SCREEN DOC REV: CPT | Performed by: SURGERY

## 2025-04-10 PROCEDURE — G8427 DOCREV CUR MEDS BY ELIG CLIN: HCPCS | Performed by: SURGERY

## 2025-04-10 RX ORDER — LORAZEPAM 0.5 MG/1
0.5 TABLET ORAL DAILY PRN
Qty: 30 TABLET | Refills: 0 | Status: SHIPPED | OUTPATIENT
Start: 2025-04-10 | End: 2025-07-09

## 2025-04-10 RX ORDER — FLUTICASONE PROPIONATE 50 MCG
SPRAY, SUSPENSION (ML) NASAL
Qty: 48 G | Refills: 3 | Status: SHIPPED | OUTPATIENT
Start: 2025-04-10

## 2025-04-10 NOTE — PROGRESS NOTES
4/10/2025    Debby Mata (:  1960) is a 65 y.o. female,Established patient, here for evaluation of the following chief complaint(s):  Follow-up (f/u reflux scan, c/o leg pain)        ASSESSMENT/PLAN:  1. Idiopathic chronic venous hypertension of both legs with inflammation  2. Thrombophlebitis of superficial veins of left lower extremity  3. Class 2 obesity due to excess calories without serious comorbidity with body mass index (BMI) of 36.0 to 36.9 in adult    Debby has pain and throbbing of the left lower extremity associated with her varicose veins.  This is improved somewhat with the addition of occasional stocking use and she is committed to wearing this consistently.  Despite this, her varicose veins are associated with SVT and thus I have recommended phlebectomy.    We discussed venous surgery and death.  She will be scheduled for radiofrequency ablation of the left great saphenous vein as well as multiple stab phlebectomies.  All questions were answered and she wishes to proceed.      SUBJECTIVE/OBJECTIVE:  Debby returns today in follow-up of her leg swelling and SVT.  Since I last saw her, she has been wearing compression on and off.  Left leg varicosities are improved in symptomatology; however, she continues to have pain related to the veins as well as aching and throbbing in the legs.  Duplex shows significant superficial reflux of the left lower extremity.    I personally reviewed images of the following studies:  Vascular US Reflux Rogelio Insufficiency Study Bilateral 4/10/2025    Physical Exam  Vitals:    04/10/25 0914   BP: (!) 140/90   BP Site: Right Upper Arm   Patient Position: Sitting   Pulse: 90   Temp: 97 °F (36.1 °C)   TempSrc: Infrared   SpO2: 94%   Height: 1.619 m (5' 3.75\")           On this date 4/10/2025 I have spent 30 minutes reviewing previous notes, test results and face to face with the patient discussing the diagnosis and importance of compliance with the treatment plan as well

## 2025-04-15 RX ORDER — SERTRALINE HYDROCHLORIDE 100 MG/1
100 TABLET, FILM COATED ORAL DAILY
Qty: 90 TABLET | Refills: 1 | Status: SHIPPED | OUTPATIENT
Start: 2025-04-15

## 2025-05-01 ENCOUNTER — TELEMEDICINE (OUTPATIENT)
Dept: INTERNAL MEDICINE CLINIC | Age: 65
End: 2025-05-01
Payer: COMMERCIAL

## 2025-05-01 DIAGNOSIS — E66.812 CLASS 2 OBESITY DUE TO EXCESS CALORIES WITHOUT SERIOUS COMORBIDITY WITH BODY MASS INDEX (BMI) OF 36.0 TO 36.9 IN ADULT: ICD-10-CM

## 2025-05-01 DIAGNOSIS — E78.00 PURE HYPERCHOLESTEROLEMIA: ICD-10-CM

## 2025-05-01 DIAGNOSIS — E11.9 TYPE 2 DIABETES MELLITUS WITHOUT COMPLICATION, WITHOUT LONG-TERM CURRENT USE OF INSULIN (HCC): Primary | ICD-10-CM

## 2025-05-01 DIAGNOSIS — E66.09 CLASS 2 OBESITY DUE TO EXCESS CALORIES WITHOUT SERIOUS COMORBIDITY WITH BODY MASS INDEX (BMI) OF 36.0 TO 36.9 IN ADULT: ICD-10-CM

## 2025-05-01 PROCEDURE — 99214 OFFICE O/P EST MOD 30 MIN: CPT | Performed by: STUDENT IN AN ORGANIZED HEALTH CARE EDUCATION/TRAINING PROGRAM

## 2025-05-01 PROCEDURE — G8427 DOCREV CUR MEDS BY ELIG CLIN: HCPCS | Performed by: STUDENT IN AN ORGANIZED HEALTH CARE EDUCATION/TRAINING PROGRAM

## 2025-05-01 PROCEDURE — G8417 CALC BMI ABV UP PARAM F/U: HCPCS | Performed by: STUDENT IN AN ORGANIZED HEALTH CARE EDUCATION/TRAINING PROGRAM

## 2025-05-01 PROCEDURE — 2022F DILAT RTA XM EVC RTNOPTHY: CPT | Performed by: STUDENT IN AN ORGANIZED HEALTH CARE EDUCATION/TRAINING PROGRAM

## 2025-05-01 PROCEDURE — 3046F HEMOGLOBIN A1C LEVEL >9.0%: CPT | Performed by: STUDENT IN AN ORGANIZED HEALTH CARE EDUCATION/TRAINING PROGRAM

## 2025-05-01 PROCEDURE — G2211 COMPLEX E/M VISIT ADD ON: HCPCS | Performed by: STUDENT IN AN ORGANIZED HEALTH CARE EDUCATION/TRAINING PROGRAM

## 2025-05-01 PROCEDURE — 3017F COLORECTAL CA SCREEN DOC REV: CPT | Performed by: STUDENT IN AN ORGANIZED HEALTH CARE EDUCATION/TRAINING PROGRAM

## 2025-05-01 PROCEDURE — 1036F TOBACCO NON-USER: CPT | Performed by: STUDENT IN AN ORGANIZED HEALTH CARE EDUCATION/TRAINING PROGRAM

## 2025-05-01 SDOH — ECONOMIC STABILITY: FOOD INSECURITY: WITHIN THE PAST 12 MONTHS, YOU WORRIED THAT YOUR FOOD WOULD RUN OUT BEFORE YOU GOT MONEY TO BUY MORE.: NEVER TRUE

## 2025-05-01 SDOH — ECONOMIC STABILITY: TRANSPORTATION INSECURITY
IN THE PAST 12 MONTHS, HAS LACK OF TRANSPORTATION KEPT YOU FROM MEETINGS, WORK, OR FROM GETTING THINGS NEEDED FOR DAILY LIVING?: NO

## 2025-05-01 SDOH — ECONOMIC STABILITY: INCOME INSECURITY: IN THE LAST 12 MONTHS, WAS THERE A TIME WHEN YOU WERE NOT ABLE TO PAY THE MORTGAGE OR RENT ON TIME?: NO

## 2025-05-01 SDOH — ECONOMIC STABILITY: FOOD INSECURITY: WITHIN THE PAST 12 MONTHS, THE FOOD YOU BOUGHT JUST DIDN'T LAST AND YOU DIDN'T HAVE MONEY TO GET MORE.: NEVER TRUE

## 2025-05-01 SDOH — ECONOMIC STABILITY: TRANSPORTATION INSECURITY
IN THE PAST 12 MONTHS, HAS THE LACK OF TRANSPORTATION KEPT YOU FROM MEDICAL APPOINTMENTS OR FROM GETTING MEDICATIONS?: NO

## 2025-05-01 ASSESSMENT — ENCOUNTER SYMPTOMS
WHEEZING: 0
VOICE CHANGE: 0
CHOKING: 0
PHOTOPHOBIA: 0
COUGH: 0
ABDOMINAL PAIN: 0
CHEST TIGHTNESS: 0
ABDOMINAL DISTENTION: 0
CONSTIPATION: 0
VOMITING: 0
SHORTNESS OF BREATH: 0
STRIDOR: 0
APNEA: 0
TROUBLE SWALLOWING: 0
NAUSEA: 0
DIARRHEA: 0

## 2025-05-01 NOTE — ASSESSMENT & PLAN NOTE
Orders:    Hemoglobin A1C; Future    Lipid, Fasting; Future    TSH reflex to FT4; Future    Comprehensive Metabolic Panel; Future    CBC with Auto Differential; Future

## 2025-05-01 NOTE — ASSESSMENT & PLAN NOTE
Doing well. A1C control.   Now on Mounjaro   Labs at next visit     Orders:    Hemoglobin A1C; Future    Lipid, Fasting; Future    TSH reflex to FT4; Future    Comprehensive Metabolic Panel; Future    CBC with Auto Differential; Future

## 2025-05-01 NOTE — ASSESSMENT & PLAN NOTE
Patient was asked about her current diet and exercise habits, and personalized advice was provided regarding recommended lifestyle changes.  Patient's comorbid health conditions associated with elevated BMI were discussed, including diabetes, dyslipidemia, and hypertension, as well as the likely benefits of weight loss.  Based upon patient's motivation to change her behavior, the following plan was agreed upon to work toward a weight loss goal of 20-30 pounds: increase physical activity as follows: increase cardio, exercise for at least 30 minutes 4-5 days per week, and medication prescribed: Ozempic. Educational materials for  weight loss were provided.  Patient will follow-up in 3 month(s) with PCP.  Provider spent 20 minutes counseling patient.    Lost about 25 pounds since started on GLP 1   Was on Ozempic but hit the plateau   Now on Mounjaro and doing well.   Will increase to 10 mg. If insurance cover then will do 1 month of 10 mg then titrate up to 12.5 mg. However if not then do 10 mg x 3 months then titrate after that.           Orders:    Hemoglobin A1C; Future    Lipid, Fasting; Future    TSH reflex to FT4; Future    Comprehensive Metabolic Panel; Future    CBC with Auto Differential; Future

## 2025-05-01 NOTE — PROGRESS NOTES
Debby Mata, was evaluated through a synchronous (real-time) audio-video encounter. The patient (or guardian if applicable) is aware that this is a billable service, which includes applicable co-pays. This Virtual Visit was conducted with patient's (and/or legal guardian's) consent. Patient identification was verified, and a caregiver was present when appropriate.   The patient was located at Home: [address] [place] [address]  Provider was located at Facility (Appt Dept): [address] [place] [address], [address]  [place] [address]  Confirm you are appropriately licensed, registered, or certified to deliver care in the state where the patient is located as indicated above. If you are not or unsure, please re-schedule the visit: Yes, I confirm.     Debby Mata (:  1960) is a Established patient, presenting virtually for evaluation of the following:      Below is the assessment and plan developed based on review of pertinent history, physical exam, labs, studies, and medications.     Assessment & Plan  Type 2 diabetes mellitus without complication, without long-term current use of insulin (HCC)  Doing well. A1C control.   Now on Mounjaro   Labs at next visit     Orders:    Hemoglobin A1C; Future    Lipid, Fasting; Future    TSH reflex to FT4; Future    Comprehensive Metabolic Panel; Future    CBC with Auto Differential; Future    Class 2 obesity due to excess calories without serious comorbidity with body mass index (BMI) of 36.0 to 36.9 in adult  Patient was asked about her current diet and exercise habits, and personalized advice was provided regarding recommended lifestyle changes.  Patient's comorbid health conditions associated with elevated BMI were discussed, including diabetes, dyslipidemia, and hypertension, as well as the likely benefits of weight loss.  Based upon patient's motivation to change her behavior, the following plan was agreed upon to work toward a weight loss goal of

## 2025-05-03 ENCOUNTER — PATIENT MESSAGE (OUTPATIENT)
Dept: INTERNAL MEDICINE CLINIC | Age: 65
End: 2025-05-03

## 2025-05-03 DIAGNOSIS — E11.9 TYPE 2 DIABETES MELLITUS WITHOUT COMPLICATION, WITHOUT LONG-TERM CURRENT USE OF INSULIN (HCC): ICD-10-CM

## 2025-05-08 ENCOUNTER — TELEPHONE (OUTPATIENT)
Dept: VASCULAR SURGERY | Age: 65
End: 2025-05-08

## 2025-05-08 NOTE — TELEPHONE ENCOUNTER
Spoke with Ms. Mata and she is scheduled for left RF GSV abl/phleb on 6/6 at 0930a with arrival time of 0730a. Informed her to hold her mounjaro 7days prior to procedure. Post-op is scheduled for 6/17 at 10a. Surgery letter was emailed to her.

## 2025-05-08 NOTE — TELEPHONE ENCOUNTER
-Pt requesting to have surgery scheduled with Dr. Hall.    4/10/25 Office Visit Plan with Dr. Hall.  'We discussed venous surgery.  She will be scheduled for radiofrequency ablation of the left great saphenous vein as well as multiple stab phlebectomies.'

## 2025-05-21 ENCOUNTER — TELEMEDICINE ON DEMAND (OUTPATIENT)
Age: 65
End: 2025-05-21
Payer: COMMERCIAL

## 2025-05-21 DIAGNOSIS — R43.2 LOSS OF TASTE: ICD-10-CM

## 2025-05-21 DIAGNOSIS — R09.81 NASAL CONGESTION: ICD-10-CM

## 2025-05-21 DIAGNOSIS — H69.93 DYSFUNCTION OF BOTH EUSTACHIAN TUBES: Primary | ICD-10-CM

## 2025-05-21 DIAGNOSIS — R43.0 LOSS OF SMELL: ICD-10-CM

## 2025-05-21 PROCEDURE — G8417 CALC BMI ABV UP PARAM F/U: HCPCS | Performed by: NURSE PRACTITIONER

## 2025-05-21 PROCEDURE — G8427 DOCREV CUR MEDS BY ELIG CLIN: HCPCS | Performed by: NURSE PRACTITIONER

## 2025-05-21 PROCEDURE — 1036F TOBACCO NON-USER: CPT | Performed by: NURSE PRACTITIONER

## 2025-05-21 PROCEDURE — 3017F COLORECTAL CA SCREEN DOC REV: CPT | Performed by: NURSE PRACTITIONER

## 2025-05-21 PROCEDURE — 99212 OFFICE O/P EST SF 10 MIN: CPT | Performed by: NURSE PRACTITIONER

## 2025-05-21 NOTE — PROGRESS NOTES
nasal saline spray  This has not helped     Cold Symptoms   Associated symptoms include congestion and ear pain (Clogged ears).     Review of Systems   HENT:  Positive for congestion and ear pain (Clogged ears).    All other systems reviewed and are negative.        Objective   Patient-Reported Vitals  No data recorded     Physical Exam  [INSTRUCTIONS:  \"[x]\" Indicates a positive item  \"[]\" Indicates a negative item  -- DELETE ALL ITEMS NOT EXAMINED]    Constitutional: [x] Appears well-developed and well-nourished [x] No apparent distress      [] Abnormal -     Mental status: [x] Alert and awake  [x] Oriented to person/place/time [x] Able to follow commands    [] Abnormal -     Eyes:   EOM    [x]  Normal    [] Abnormal -   Sclera  [x]  Normal    [] Abnormal -          Discharge [x]  None visible   [] Abnormal -     HENT: [x] Normocephalic, atraumatic  [] Abnormal -   [] Mouth/Throat: Mucous membranes are moist    External Ears [] Normal  [] Abnormal -    Neck: [x] No visualized mass [] Abnormal -     Pulmonary/Chest: [x] Respiratory effort normal   [x] No visualized signs of difficulty breathing or respiratory distress        [] Abnormal -      Musculoskeletal:   [] Normal gait with no signs of ataxia         [x] Normal range of motion of neck        [] Abnormal -     Neurological:        [x] No Facial Asymmetry (Cranial nerve 7 motor function) (limited exam due to video visit)          [] No gaze palsy        [] Abnormal -          Skin:        [x] No significant exanthematous lesions or discoloration noted on facial skin         [] Abnormal -            Psychiatric:       [x] Normal Affect [] Abnormal -           On this date 5/21/2025 I have spent 10 minutes reviewing previous notes, test results, and other pertinent medical information with the patient, discussing the diagnosis and importance of compliance with the treatment plan as well as documenting on the day of the visit.    --Imani Villalobos, APRN - CNP  Deep Sutures: 5-0 Vicryl

## 2025-05-22 ENCOUNTER — OFFICE VISIT (OUTPATIENT)
Dept: FAMILY MEDICINE CLINIC | Age: 65
End: 2025-05-22
Payer: COMMERCIAL

## 2025-05-22 VITALS
SYSTOLIC BLOOD PRESSURE: 122 MMHG | DIASTOLIC BLOOD PRESSURE: 80 MMHG | HEIGHT: 64 IN | HEART RATE: 72 BPM | TEMPERATURE: 98.5 F | OXYGEN SATURATION: 95 % | BODY MASS INDEX: 40.48 KG/M2

## 2025-05-22 DIAGNOSIS — J01.90 ACUTE NON-RECURRENT SINUSITIS, UNSPECIFIED LOCATION: Primary | ICD-10-CM

## 2025-05-22 PROCEDURE — 99213 OFFICE O/P EST LOW 20 MIN: CPT | Performed by: NURSE PRACTITIONER

## 2025-05-22 PROCEDURE — G8417 CALC BMI ABV UP PARAM F/U: HCPCS | Performed by: NURSE PRACTITIONER

## 2025-05-22 PROCEDURE — 1036F TOBACCO NON-USER: CPT | Performed by: NURSE PRACTITIONER

## 2025-05-22 PROCEDURE — G8427 DOCREV CUR MEDS BY ELIG CLIN: HCPCS | Performed by: NURSE PRACTITIONER

## 2025-05-22 PROCEDURE — 3017F COLORECTAL CA SCREEN DOC REV: CPT | Performed by: NURSE PRACTITIONER

## 2025-05-22 SDOH — ECONOMIC STABILITY: FOOD INSECURITY: WITHIN THE PAST 12 MONTHS, YOU WORRIED THAT YOUR FOOD WOULD RUN OUT BEFORE YOU GOT MONEY TO BUY MORE.: PATIENT DECLINED

## 2025-05-22 SDOH — ECONOMIC STABILITY: FOOD INSECURITY: WITHIN THE PAST 12 MONTHS, THE FOOD YOU BOUGHT JUST DIDN'T LAST AND YOU DIDN'T HAVE MONEY TO GET MORE.: PATIENT DECLINED

## 2025-05-22 ASSESSMENT — ENCOUNTER SYMPTOMS
SHORTNESS OF BREATH: 0
SORE THROAT: 0
ABDOMINAL PAIN: 0
EYE REDNESS: 0
BACK PAIN: 0
COUGH: 0
VOMITING: 0
SINUS PRESSURE: 1
COLOR CHANGE: 0
CONSTIPATION: 0
RHINORRHEA: 1
DIARRHEA: 0
NAUSEA: 0
EYE PAIN: 0
WHEEZING: 0
SINUS PAIN: 1

## 2025-05-22 NOTE — PROGRESS NOTES
Debby Mata (:  1960) is a 65 y.o. female,Established patient, here for evaluation of the following chief complaint(s):  Congestion (Present for ~2 weeks)      ASSESSMENT/PLAN:  Assessment & Plan  Acute non-recurrent sinusitis, unspecified location   New, not at goal (unstable), Prescribed Augmentin. Encouraged continued use of Flonase, decongestant, antihistamine, and sinus rinses.        No follow-ups on file.    SUBJECTIVE/OBJECTIVE:    History of Present Illness  Patient is a 65 y.o. female presenting for 2+ weeks of sinus congestion. Patient denies known fevers, chills, or chest congestion. Patient complains of headaches, sinus tenderness, and loss of taste. Patient feels like there is \"fluid in her ears\". Patient has clear drainage from her nose and ears intermittently. Patient taking sudafed, benadryl, Flonase, and doing sinus rinses with no relief in congestion. No sick contacts. No change in PO intake, continues eating and drinking appropriately.         Current Outpatient Medications   Medication Sig Dispense Refill   • amoxicillin-clavulanate (AUGMENTIN) 875-125 MG per tablet Take 1 tablet by mouth 2 times daily for 10 days 20 tablet 0   • Tirzepatide (MOUNJARO) 10 MG/0.5ML SOAJ pen Inject 10 mg into the skin every 7 days 6 mL 0   • sertraline (ZOLOFT) 100 MG tablet TAKE 1 TABLET DAILY 90 tablet 1   • LORazepam (ATIVAN) 0.5 MG tablet Take 1 tablet by mouth daily as needed for Anxiety for up to 90 days. Max Daily Amount: 0.5 mg 30 tablet 0   • fluticasone (FLONASE) 50 MCG/ACT nasal spray 1 spray each nostril bid 48 g 3   • aspirin 81 MG EC tablet Take 1 tablet by mouth daily     • atorvastatin (LIPITOR) 40 MG tablet TAKE 1 TABLET DAILY 90 tablet 1   • Multiple Vitamins-Minerals (MULTIVITAMIN ADULTS 50+ PO) Take by mouth daily     • sulfaSALAzine (AZULFIDINE) 500 MG tablet TAKE 2 TABLETS BY MOUTH TWICE A  tablet 1   • JUBLIA 10 % SOLN      • MINOXIDIL, TOPICAL, 5 % SOLN Apply topically     •

## 2025-05-28 ENCOUNTER — PREP FOR PROCEDURE (OUTPATIENT)
Dept: VASCULAR SURGERY | Age: 65
End: 2025-05-28

## 2025-05-28 DIAGNOSIS — I87.323 IDIOPATHIC CHRONIC VENOUS HYPERTENSION OF BOTH LOWER EXTREMITIES WITH INFLAMMATION: ICD-10-CM

## 2025-05-28 DIAGNOSIS — I87.323 IDIOPATHIC CHRONIC VENOUS HYPERTENSION OF BOTH LEGS WITH INFLAMMATION: ICD-10-CM

## 2025-05-28 DIAGNOSIS — I80.02 THROMBOPHLEBITIS OF SUPERFICIAL VEINS OF LEFT LOWER EXTREMITY: Primary | ICD-10-CM

## 2025-05-28 RX ORDER — SODIUM CHLORIDE 0.9 % (FLUSH) 0.9 %
5-40 SYRINGE (ML) INJECTION EVERY 12 HOURS SCHEDULED
Status: CANCELLED | OUTPATIENT
Start: 2025-06-06

## 2025-05-28 RX ORDER — SODIUM CHLORIDE 0.9 % (FLUSH) 0.9 %
5-40 SYRINGE (ML) INJECTION PRN
Status: CANCELLED | OUTPATIENT
Start: 2025-06-06

## 2025-05-28 RX ORDER — SODIUM CHLORIDE 9 MG/ML
INJECTION, SOLUTION INTRAVENOUS PRN
Status: CANCELLED | OUTPATIENT
Start: 2025-06-06

## 2025-06-02 ENCOUNTER — TELEPHONE (OUTPATIENT)
Dept: VASCULAR SURGERY | Age: 65
End: 2025-06-02

## 2025-06-02 NOTE — TELEPHONE ENCOUNTER
Returned call to to patient regarding Mounjaro injection. Per Dr Hall as can proceed with surgery Friday since last one will be almost a week before surgery Friday. Pamantoinette

## 2025-06-02 NOTE — TELEPHONE ENCOUNTER
Patient is calling to ask a lot a questions about recovery. About the surgery pre and post. Sounds like she did not get a call from [facility]. Please call her on Tuesday at [phone]

## 2025-06-03 NOTE — TELEPHONE ENCOUNTER
Spoke with Ms. Mata went over post-op instructions. I did clarify with Dr. Hall that Ms. Mata had taken her Mounjaro on Sunday, which would be ok since surgery is not till Friday.

## 2025-06-04 NOTE — PROGRESS NOTES
PRE-OP INSTRUCTIONS FOR SURGICAL PATIENTS          Our Pre-admission Testing Nurses tried and were unable to reach you today.  Please read the attached instructions if you did not listen to your voicemail.     Follow all instructions provided to you from your surgeon's office, including your ARRIVAL TIME.   Arrange for someone to drive you home and be with you for the first 24 hours after discharge.     NOTE: at this time ONLY 2 ADULTS may accompany you   One person encouraged to stay at hospital entire time if outpatient surgery    Enter the MAIN entrance located on [address] and report to the surgical desk on the LEFT side of the lobby. Please park in the parking garage or there is free  Parking available after 7am for your use.    Bring your insurance card & photo ID with you to register.  Bring your medication list with you with dose and frequency listed (including over the counter medications)  Contact your ordering physician/surgeon for medication instructions as soon as possible, especially if taking blood thinners, aspirin, heart, or diabetic medication.  Bariatric surgical patients need to call your surgeon if on diabetic medications (as some may need to be stopped 1-week preop)  A Pre-Surgical History and Physical MUST be completed WITHIN 30 DAYS OR LESS prior to your procedure by your Physician or an Urgent Care.  DO NOT EAT ANYTHING after MIDNIGHT prior to arrival for surgery.  NOTE: ALL Gastric, Bariatric & Bowel surgery patients - you MUST follow your surgeon's instructions regarding eating/drinking as you will have very specific instructions         to follow.  If you did not receive these, call your surgeon's office immediately.    No gum, candy, mints, or ice chips day of procedure.   Please refrain from drinking alcohol the day before or day of your procedure.   Do not use any recreational marijuana at least 24 hours or street drugs (heroin, cocaine) at minimum 5 days prior to your

## 2025-06-04 NOTE — PROGRESS NOTES
[facility] PRE-SURGICAL TESTING INSTRUCTIONS                      PRIOR TO PROCEDURE DATE:    1. PLEASE FOLLOW ANY INSTRUCTIONS GIVEN TO YOU PER YOUR SURGEON.      2. Arrange for someone to drive you home and be with you for the first 24 hours after discharge for your safety after your procedure for which you received sedation. Ensure it is someone we can share information with regarding your discharge.     NOTE: At this time ONLY 2 ADULTS may accompany you   One person ENCOURAGED to stay at hospital entire time if outpatient surgery      3. You must contact your surgeon for instructions IF:  You are taking any blood thinners, aspirin, anti-inflammatory or vitamins.  There is a change in your physical condition such as a cold, fever, rash, cuts, sores, or any other infection, especially near your surgical site.    4. Do not drink alcohol the day before or day of your procedure.  Do not use any recreational marijuana at least 24 hours or street drugs (heroin, cocaine) at minimum 5 days prior to your procedure.     5. A Pre-Surgical History and Physical MUST be completed WITHIN 30 DAYS OR LESS prior to your procedure.by your Physician or an Urgent Care        THE DAY OF YOUR PROCEDURE:  1.  Follow instructions for ARRIVAL TIME as DIRECTED BY YOUR SURGEON.     2. Enter the MAIN entrance from [facility] [employer] and follow the signs to the free Parking Garage or  Parking (offered free of charge 7 am-5pm).      3. Enter the Main Entrance of the hospital (do not enter from the lower level of the parking garage). Upon entrance, check in with the  at the surgical information desk on your LEFT.   Bring your insurance card and photo ID to register      4. DO NOT EAT ANYTHING AFTER MIDNIGHT prior to arrival for surgery.    NOTE: ALL Gastric, Bariatric & Bowel surgery patients - you MUST follow your surgeon's instructions regarding eating/ drinking as you will have very specific instructions to follow.  If

## 2025-06-05 DIAGNOSIS — E78.5 HYPERLIPIDEMIA, UNSPECIFIED HYPERLIPIDEMIA TYPE: ICD-10-CM

## 2025-06-05 DIAGNOSIS — R06.09 DOE (DYSPNEA ON EXERTION): ICD-10-CM

## 2025-06-05 RX ORDER — ATORVASTATIN CALCIUM 40 MG/1
TABLET, FILM COATED ORAL
Qty: 90 TABLET | Refills: 1 | Status: SHIPPED | OUTPATIENT
Start: 2025-06-05

## 2025-06-06 ENCOUNTER — ANESTHESIA EVENT (OUTPATIENT)
Dept: OPERATING ROOM | Age: 65
End: 2025-06-06
Payer: COMMERCIAL

## 2025-06-06 ENCOUNTER — HOSPITAL ENCOUNTER (OUTPATIENT)
Age: 65
Setting detail: OUTPATIENT SURGERY
Discharge: HOME | End: 2025-06-06
Attending: SURGERY | Admitting: SURGERY
Payer: COMMERCIAL

## 2025-06-06 ENCOUNTER — ANESTHESIA (OUTPATIENT)
Dept: OPERATING ROOM | Age: 65
End: 2025-06-06
Payer: COMMERCIAL

## 2025-06-06 ENCOUNTER — HOSPITAL ENCOUNTER (OUTPATIENT)
Dept: VASCULAR LAB | Age: 65
Setting detail: OUTPATIENT SURGERY
Discharge: HOME | End: 2025-06-08
Attending: SURGERY
Payer: COMMERCIAL

## 2025-06-06 VITALS
WEIGHT: 197 LBS | DIASTOLIC BLOOD PRESSURE: 66 MMHG | HEART RATE: 73 BPM | SYSTOLIC BLOOD PRESSURE: 111 MMHG | RESPIRATION RATE: 18 BRPM | TEMPERATURE: 97.7 F | HEIGHT: 64 IN | BODY MASS INDEX: 33.63 KG/M2 | OXYGEN SATURATION: 96 %

## 2025-06-06 DIAGNOSIS — I87.323 IDIOPATHIC CHRONIC VENOUS HYPERTENSION OF BOTH LEGS WITH INFLAMMATION: ICD-10-CM

## 2025-06-06 DIAGNOSIS — I80.02 THROMBOPHLEBITIS OF SUPERFICIAL VEINS OF LEFT LOWER EXTREMITY: ICD-10-CM

## 2025-06-06 LAB
ANION GAP SERPL CALCULATED.3IONS-SCNC: 10 MMOL/L (ref 3–16)
BUN SERPL-MCNC: 12 MG/DL (ref 7–20)
CALCIUM SERPL-MCNC: 9 MG/DL (ref 8.3–10.6)
CHLORIDE SERPL-SCNC: 108 MMOL/L (ref 99–110)
CO2 SERPL-SCNC: 23 MMOL/L (ref 21–32)
CREAT SERPL-MCNC: 0.8 MG/DL (ref 0.6–1.2)
DEPRECATED RDW RBC AUTO: 13.2 % (ref 12.4–15.4)
ECHO BSA: 2.01 M2
EKG ATRIAL RATE: 76 BPM
EKG DIAGNOSIS: NORMAL
EKG P AXIS: 11 DEGREES
EKG P-R INTERVAL: 174 MS
EKG Q-T INTERVAL: 350 MS
EKG QRS DURATION: 76 MS
EKG QTC CALCULATION (BAZETT): 393 MS
EKG R AXIS: -19 DEGREES
EKG T AXIS: 13 DEGREES
EKG VENTRICULAR RATE: 76 BPM
GFR SERPLBLD CREATININE-BSD FMLA CKD-EPI: 83 ML/MIN/{1.73_M2}
GLUCOSE SERPL-MCNC: 101 MG/DL (ref 70–99)
HCT VFR BLD AUTO: 40.9 % (ref 36–48)
HGB BLD-MCNC: 13.9 G/DL (ref 12–16)
INR PPP: 1.14 (ref 0.85–1.15)
MCH RBC QN AUTO: 30.4 PG (ref 26–34)
MCHC RBC AUTO-ENTMCNC: 34 G/DL (ref 31–36)
MCV RBC AUTO: 89.2 FL (ref 80–100)
PLATELET # BLD AUTO: 177 K/UL (ref 135–450)
PMV BLD AUTO: 10.6 FL (ref 5–10.5)
POTASSIUM SERPL-SCNC: 3.6 MMOL/L (ref 3.5–5.1)
PROTHROMBIN TIME: 14.8 SEC (ref 11.9–14.9)
RBC # BLD AUTO: 4.58 M/UL (ref 4–5.2)
SODIUM SERPL-SCNC: 141 MMOL/L (ref 136–145)
WBC # BLD AUTO: 6.6 K/UL (ref 4–11)

## 2025-06-06 PROCEDURE — 6360000002 HC RX W HCPCS: Performed by: SURGERY

## 2025-06-06 PROCEDURE — 85610 PROTHROMBIN TIME: CPT

## 2025-06-06 PROCEDURE — 3700000000 HC ANESTHESIA ATTENDED CARE: Performed by: SURGERY

## 2025-06-06 PROCEDURE — 6360000002 HC RX W HCPCS: Performed by: NURSE ANESTHETIST, CERTIFIED REGISTERED

## 2025-06-06 PROCEDURE — 93010 ELECTROCARDIOGRAM REPORT: CPT | Performed by: INTERNAL MEDICINE

## 2025-06-06 PROCEDURE — C1769 GUIDE WIRE: HCPCS | Performed by: SURGERY

## 2025-06-06 PROCEDURE — 2500000003 HC RX 250 WO HCPCS: Performed by: SURGERY

## 2025-06-06 PROCEDURE — 7100000011 HC PHASE II RECOVERY - ADDTL 15 MIN: Performed by: SURGERY

## 2025-06-06 PROCEDURE — C1888 ENDOVAS NON-CARDIAC ABL CATH: HCPCS | Performed by: SURGERY

## 2025-06-06 PROCEDURE — 2580000003 HC RX 258: Performed by: ANESTHESIOLOGY

## 2025-06-06 PROCEDURE — C1894 INTRO/SHEATH, NON-LASER: HCPCS | Performed by: SURGERY

## 2025-06-06 PROCEDURE — 80048 BASIC METABOLIC PNL TOTAL CA: CPT

## 2025-06-06 PROCEDURE — 93005 ELECTROCARDIOGRAM TRACING: CPT | Performed by: SURGERY

## 2025-06-06 PROCEDURE — 2500000003 HC RX 250 WO HCPCS: Performed by: NURSE ANESTHETIST, CERTIFIED REGISTERED

## 2025-06-06 PROCEDURE — 93971 EXTREMITY STUDY: CPT | Performed by: SURGERY

## 2025-06-06 PROCEDURE — 93971 EXTREMITY STUDY: CPT

## 2025-06-06 PROCEDURE — 7100000000 HC PACU RECOVERY - FIRST 15 MIN: Performed by: SURGERY

## 2025-06-06 PROCEDURE — 3600000014 HC SURGERY LEVEL 4 ADDTL 15MIN: Performed by: SURGERY

## 2025-06-06 PROCEDURE — 3600000004 HC SURGERY LEVEL 4 BASE: Performed by: SURGERY

## 2025-06-06 PROCEDURE — 7100000010 HC PHASE II RECOVERY - FIRST 15 MIN: Performed by: SURGERY

## 2025-06-06 PROCEDURE — 2580000003 HC RX 258: Performed by: SURGERY

## 2025-06-06 PROCEDURE — 3700000001 HC ADD 15 MINUTES (ANESTHESIA): Performed by: SURGERY

## 2025-06-06 PROCEDURE — 7100000001 HC PACU RECOVERY - ADDTL 15 MIN: Performed by: SURGERY

## 2025-06-06 PROCEDURE — 85027 COMPLETE CBC AUTOMATED: CPT

## 2025-06-06 PROCEDURE — 2709999900 HC NON-CHARGEABLE SUPPLY: Performed by: SURGERY

## 2025-06-06 RX ORDER — SODIUM CHLORIDE 0.9 % (FLUSH) 0.9 %
5-40 SYRINGE (ML) INJECTION PRN
Status: DISCONTINUED | OUTPATIENT
Start: 2025-06-06 | End: 2025-06-06 | Stop reason: HOSPADM

## 2025-06-06 RX ORDER — SODIUM CHLORIDE 0.9 % (FLUSH) 0.9 %
5-40 SYRINGE (ML) INJECTION EVERY 12 HOURS SCHEDULED
Status: DISCONTINUED | OUTPATIENT
Start: 2025-06-06 | End: 2025-06-06 | Stop reason: HOSPADM

## 2025-06-06 RX ORDER — FENTANYL CITRATE 50 UG/ML
25 INJECTION, SOLUTION INTRAMUSCULAR; INTRAVENOUS EVERY 5 MIN PRN
Status: DISCONTINUED | OUTPATIENT
Start: 2025-06-06 | End: 2025-06-06 | Stop reason: HOSPADM

## 2025-06-06 RX ORDER — LIDOCAINE HYDROCHLORIDE 20 MG/ML
INJECTION, SOLUTION INTRAVENOUS
Status: DISCONTINUED | OUTPATIENT
Start: 2025-06-06 | End: 2025-06-06 | Stop reason: SDUPTHER

## 2025-06-06 RX ORDER — IPRATROPIUM BROMIDE AND ALBUTEROL SULFATE 2.5; .5 MG/3ML; MG/3ML
1 SOLUTION RESPIRATORY (INHALATION)
Status: DISCONTINUED | OUTPATIENT
Start: 2025-06-06 | End: 2025-06-06 | Stop reason: HOSPADM

## 2025-06-06 RX ORDER — NALOXONE HYDROCHLORIDE 0.4 MG/ML
INJECTION, SOLUTION INTRAMUSCULAR; INTRAVENOUS; SUBCUTANEOUS PRN
Status: DISCONTINUED | OUTPATIENT
Start: 2025-06-06 | End: 2025-06-06 | Stop reason: HOSPADM

## 2025-06-06 RX ORDER — LABETALOL HYDROCHLORIDE 5 MG/ML
10 INJECTION, SOLUTION INTRAVENOUS
Status: DISCONTINUED | OUTPATIENT
Start: 2025-06-06 | End: 2025-06-06 | Stop reason: HOSPADM

## 2025-06-06 RX ORDER — PROPOFOL 10 MG/ML
INJECTION, EMULSION INTRAVENOUS
Status: DISCONTINUED | OUTPATIENT
Start: 2025-06-06 | End: 2025-06-06 | Stop reason: SDUPTHER

## 2025-06-06 RX ORDER — SODIUM CHLORIDE 9 MG/ML
INJECTION, SOLUTION INTRAVENOUS PRN
Status: DISCONTINUED | OUTPATIENT
Start: 2025-06-06 | End: 2025-06-06 | Stop reason: HOSPADM

## 2025-06-06 RX ORDER — ONDANSETRON 2 MG/ML
4 INJECTION INTRAMUSCULAR; INTRAVENOUS
Status: DISCONTINUED | OUTPATIENT
Start: 2025-06-06 | End: 2025-06-06 | Stop reason: HOSPADM

## 2025-06-06 RX ORDER — LIDOCAINE HYDROCHLORIDE 10 MG/ML
1 INJECTION, SOLUTION EPIDURAL; INFILTRATION; INTRACAUDAL; PERINEURAL
Status: DISCONTINUED | OUTPATIENT
Start: 2025-06-06 | End: 2025-06-06 | Stop reason: HOSPADM

## 2025-06-06 RX ORDER — PHENYLEPHRINE HCL IN 0.9% NACL 1 MG/10 ML
SYRINGE (ML) INTRAVENOUS
Status: DISCONTINUED | OUTPATIENT
Start: 2025-06-06 | End: 2025-06-06 | Stop reason: SDUPTHER

## 2025-06-06 RX ORDER — HYDROMORPHONE HYDROCHLORIDE 1 MG/ML
0.5 INJECTION, SOLUTION INTRAMUSCULAR; INTRAVENOUS; SUBCUTANEOUS EVERY 5 MIN PRN
Status: DISCONTINUED | OUTPATIENT
Start: 2025-06-06 | End: 2025-06-06 | Stop reason: HOSPADM

## 2025-06-06 RX ORDER — OXYCODONE HYDROCHLORIDE 5 MG/1
5 TABLET ORAL
Status: DISCONTINUED | OUTPATIENT
Start: 2025-06-06 | End: 2025-06-06 | Stop reason: HOSPADM

## 2025-06-06 RX ORDER — SODIUM CHLORIDE, SODIUM LACTATE, POTASSIUM CHLORIDE, CALCIUM CHLORIDE 600; 310; 30; 20 MG/100ML; MG/100ML; MG/100ML; MG/100ML
INJECTION, SOLUTION INTRAVENOUS CONTINUOUS
Status: DISCONTINUED | OUTPATIENT
Start: 2025-06-06 | End: 2025-06-06 | Stop reason: HOSPADM

## 2025-06-06 RX ORDER — DIPHENHYDRAMINE HYDROCHLORIDE 50 MG/ML
12.5 INJECTION, SOLUTION INTRAMUSCULAR; INTRAVENOUS
Status: DISCONTINUED | OUTPATIENT
Start: 2025-06-06 | End: 2025-06-06 | Stop reason: HOSPADM

## 2025-06-06 RX ORDER — MAGNESIUM HYDROXIDE 1200 MG/15ML
LIQUID ORAL CONTINUOUS PRN
Status: COMPLETED | OUTPATIENT
Start: 2025-06-06 | End: 2025-06-06

## 2025-06-06 RX ORDER — MEPERIDINE HYDROCHLORIDE 25 MG/ML
12.5 INJECTION INTRAMUSCULAR; INTRAVENOUS; SUBCUTANEOUS EVERY 5 MIN PRN
Status: DISCONTINUED | OUTPATIENT
Start: 2025-06-06 | End: 2025-06-06 | Stop reason: HOSPADM

## 2025-06-06 RX ORDER — SUCCINYLCHOLINE/SOD CL,ISO/PF 100 MG/5ML
SYRINGE (ML) INTRAVENOUS
Status: DISCONTINUED | OUTPATIENT
Start: 2025-06-06 | End: 2025-06-06 | Stop reason: SDUPTHER

## 2025-06-06 RX ORDER — DEXAMETHASONE SODIUM PHOSPHATE 4 MG/ML
INJECTION, SOLUTION INTRA-ARTICULAR; INTRALESIONAL; INTRAMUSCULAR; INTRAVENOUS; SOFT TISSUE
Status: DISCONTINUED | OUTPATIENT
Start: 2025-06-06 | End: 2025-06-06 | Stop reason: SDUPTHER

## 2025-06-06 RX ORDER — ONDANSETRON 2 MG/ML
INJECTION INTRAMUSCULAR; INTRAVENOUS
Status: DISCONTINUED | OUTPATIENT
Start: 2025-06-06 | End: 2025-06-06 | Stop reason: SDUPTHER

## 2025-06-06 RX ORDER — ACETAMINOPHEN 500 MG
500 TABLET ORAL 4 TIMES DAILY PRN
Qty: 120 TABLET | Refills: 0 | Status: SHIPPED | OUTPATIENT
Start: 2025-06-06

## 2025-06-06 RX ORDER — PROCHLORPERAZINE EDISYLATE 5 MG/ML
5 INJECTION INTRAMUSCULAR; INTRAVENOUS
Status: DISCONTINUED | OUTPATIENT
Start: 2025-06-06 | End: 2025-06-06 | Stop reason: HOSPADM

## 2025-06-06 RX ORDER — ROCURONIUM BROMIDE 10 MG/ML
INJECTION, SOLUTION INTRAVENOUS
Status: DISCONTINUED | OUTPATIENT
Start: 2025-06-06 | End: 2025-06-06 | Stop reason: SDUPTHER

## 2025-06-06 RX ADMIN — DEXAMETHASONE SODIUM PHOSPHATE 4 MG: 4 INJECTION INTRA-ARTICULAR; INTRALESIONAL; INTRAMUSCULAR; INTRAVENOUS; SOFT TISSUE at 09:26

## 2025-06-06 RX ADMIN — SUGAMMADEX 200 MG: 100 INJECTION, SOLUTION INTRAVENOUS at 10:48

## 2025-06-06 RX ADMIN — HYDROMORPHONE HYDROCHLORIDE 1 MG: 1 INJECTION, SOLUTION INTRAMUSCULAR; INTRAVENOUS; SUBCUTANEOUS at 09:13

## 2025-06-06 RX ADMIN — PROPOFOL 50 MG: 10 INJECTION, EMULSION INTRAVENOUS at 10:19

## 2025-06-06 RX ADMIN — PROPOFOL 150 MG: 10 INJECTION, EMULSION INTRAVENOUS at 09:18

## 2025-06-06 RX ADMIN — Medication 140 MG: at 09:18

## 2025-06-06 RX ADMIN — ONDANSETRON 4 MG: 2 INJECTION, SOLUTION INTRAMUSCULAR; INTRAVENOUS at 09:13

## 2025-06-06 RX ADMIN — SODIUM CHLORIDE, SODIUM LACTATE, POTASSIUM CHLORIDE, AND CALCIUM CHLORIDE: .6; .31; .03; .02 INJECTION, SOLUTION INTRAVENOUS at 08:43

## 2025-06-06 RX ADMIN — ROCURONIUM BROMIDE 50 MG: 10 INJECTION, SOLUTION INTRAVENOUS at 09:22

## 2025-06-06 RX ADMIN — WATER 2000 MG: 1 INJECTION INTRAMUSCULAR; INTRAVENOUS; SUBCUTANEOUS at 09:24

## 2025-06-06 RX ADMIN — ROCURONIUM BROMIDE 30 MG: 10 INJECTION, SOLUTION INTRAVENOUS at 10:19

## 2025-06-06 RX ADMIN — LIDOCAINE HYDROCHLORIDE 100 MG: 20 INJECTION, SOLUTION INTRAVENOUS at 09:18

## 2025-06-06 RX ADMIN — Medication 200 MCG: at 09:53

## 2025-06-06 ASSESSMENT — PAIN SCALES - GENERAL
PAINLEVEL_OUTOF10: 0
PAINLEVEL_OUTOF10: 3

## 2025-06-06 ASSESSMENT — PAIN - FUNCTIONAL ASSESSMENT: PAIN_FUNCTIONAL_ASSESSMENT: 0-10

## 2025-06-06 NOTE — ANESTHESIA POSTPROCEDURE EVALUATION
Department of Anesthesiology  Postprocedure Note    Patient: Debby Mata  MRN: [id number]  YOB: 1960  Date of evaluation: 6/6/2025    Procedure Summary       Date: 06/06/25 Room / Location: [place] [address] [facility]    Anesthesia Start: 0913 Anesthesia Stop: 1100    Procedure: LEFT GREAT SAPHENOUS VEIN ABLATION RADIOFREQUENCY AND PHLEBECTOMIES (Left: Leg Upper) Diagnosis:       Thrombophlebitis of superficial veins of left lower extremity      Idiopathic chronic venous hypertension of both lower extremities with inflammation      (Thrombophlebitis of superficial veins of left lower extremity [I80.02])      (Idiopathic chronic venous hypertension of both lower extremities with inflammation [I87.323])    Surgeons: Kirstin Hall MD Responsible Provider: Tess Lenz MD    Anesthesia Type: general ASA Status: 2            Anesthesia Type: No value filed.    Lizzy Phase I: Lizzy Score: 8    Lizzy Phase II:      Anesthesia Post Evaluation    Patient location during evaluation: PACU  Patient participation: complete - patient participated  Level of consciousness: awake  Airway patency: patent  Nausea & Vomiting: no nausea and no vomiting  Cardiovascular status: hemodynamically stable  Respiratory status: acceptable  Hydration status: euvolemic  Pain management: adequate and satisfactory to patient    No notable events documented.

## 2025-06-06 NOTE — PROGRESS NOTES
PACU Transfer to [facility]    Vitals:    06/06/25 1200   BP: 121/76   Pulse: 76   Resp: 17   Temp: 98 °F (36.7 °C)   SpO2: 95%         Intake/Output Summary (Last 24 hours) at 6/6/2025 1208  Last data filed at 6/6/2025 1200  Gross per 24 hour   Intake 170 ml   Output --   Net 170 ml       Pain assessment:  not receiving treatment  Pain Level: 3    Patient transferred to care of [facility] RN.  PATIENT'S GLASSES ON WITH GLASS CASE IN HER LAP. PATIENT'S SON TO  TYLENOL PRESCRIPTION FROM [facility].   6/6/2025 12:08 PM

## 2025-06-06 NOTE — PROGRESS NOTES
Patient admitted to PACU #18 from OR per stretcher at 1058 s/p LEFT GREAT SAPHENOUS VEIN ABLATION RADIOFREQUENCY AND PHLEBECTOMIES - Left. Report received at bedside in PACU per CRNA and OR nurse. Patient was reported to be hypotensive during surgery which she received treatment for, see anesthesia record. No complications reported. Patient connected to PACU monitoring equipment. IVF's infusing with site unremarkable. Patient arrived to PACU responsive but not fully wakeful from anesthesia with no difficulties noted and no pain verbalized. Left leg surgical dressing from groin to toes with DSD and coban which remains C,D,I without any drainage noted. No further changes. Will continue to monitor.

## 2025-06-06 NOTE — BRIEF OP NOTE
Brief Postoperative Note      Patient: Debby Mata  YOB: 1960  MRN: [id number]    Date of Procedure: 6/6/2025    Pre-Op Diagnosis Codes:      * Thrombophlebitis of superficial veins of left lower extremity [I80.02]     * Idiopathic chronic venous hypertension of both lower extremities with inflammation [I87.323]    Post-Op Diagnosis: Same       Procedure(s):  LEFT GREAT SAPHENOUS VEIN ABLATION RADIOFREQUENCY AND PHLEBECTOMIES    Surgeon(s):  Kirstin Hall MD    Assistant:  Resident: Aj Magallanes DPM    Anesthesia: General    Estimated Blood Loss (mL): Minimal    Complications: None    Specimens:   * No specimens in log *    Implants:  * No implants in log *      Drains: * No LDAs found *    Findings:  Infection Present At Time Of Surgery (PATOS) (choose all levels that have infection present):  No infection present  Other Findings: successful ablation of left GSV.  Common femoral vein was patent and compressible at conclusion of case.    Electronically signed by Kirstin Hall MD on 6/6/2025 at 10:47 AM

## 2025-06-06 NOTE — PROGRESS NOTES
Called and updated patient's son, Beck, at 1147 with patient update and plan moving forward for discharge.

## 2025-06-06 NOTE — OP NOTE
Operative Note      Patient: Debby Mata  YOB: 1960  MRN: [id number]    Date of Procedure: 6/6/2025    Pre-Op Diagnosis Codes:      * Thrombophlebitis of superficial veins of left lower extremity [I80.02]     * Idiopathic chronic venous hypertension of both lower extremities with inflammation [I87.323]    Post-Op Diagnosis: Post-Op Diagnosis Codes:     * Thrombophlebitis of superficial veins of left lower extremity [I80.02]     * Idiopathic chronic venous hypertension of both lower extremities with inflammation [I87.323]       Procedure(s):  LEFT GREAT SAPHENOUS VEIN ABLATION RADIOFREQUENCY AND PHLEBECTOMIES    Surgeon(s):  Kirstin Hall MD    Assistant:   Resident: Aj Magallanes DPM    Anesthesia: General    Estimated Blood Loss (mL): Minimal    Complications: None    Specimens:   * No specimens in log *    Implants:  * No implants in log *      Drains: * No LDAs found *    Findings:  Infection Present At Time Of Surgery (PATOS) (choose all levels that have infection present):  No infection present      Detailed Description of Procedure:   The patient was taken to the operating room and placed in a supine position, prepped and draped in the usual sterile fashion using betadine solution.  Time out was performed and patient and operative site appropriately identified per hospital protocol, and patient was given IV antibiotics prior to the incision.     The left great saphenous vein was identified and accessed under ultrasound guidance and a micropuncture sheath was placed through which a Bentson wire was inserted.  Evaluation of the left GSV was notable for some chronic non-occlusive material consistent with prior phlebitis.  The vein was otherwise easily compressible without any evidence of acute thrombus  The sheath was exchanged for a 7 Nigerien sheath through which a 0.025 wire was inserted and passed up through the saphenofemoral junction.  A 7 cm x 60 cm length radiofrequency catheter

## 2025-06-06 NOTE — H&P
HISTORY AND PHYSICAL             Date: 6/6/2025        Patient Name: Debby Mata     YOB: 1960      Age:  65 y.o.    Chief Complaint   No chief complaint on file.  Painful varicose veins left leg    History Obtained From   patient    History of Present Illness   H/o bilateral lower extremity varicose veins.  Left leg SVT now improving.  Wearing compression stockings consistently and this is helping with symptoms.  No problems since last visit with me in April.    Past Medical History     Past Medical History:   Diagnosis Date    Allergic rhinitis, cause unspecified     chronic    Anxiety state, unspecified     Arthritis     Blepharophimosis     Contact dermatitis and other eczema, due to unspecified cause     Depressive disorder, not elsewhere classified     Headache(784.0)     Hearing loss     Hx of blood clots 2023    Hyperglycemia 12/12/2014    Max glyco 6.6- down to 5.8 2014 6.1 12/2016- for bing 6/2017 and start metformin if still up  Refusing metformin     Insomnia, unspecified     Obesity, unspecified     Other and unspecified hyperlipidemia     Other specified congenital anomaly of skin     Pain in both feet 12/17/2015    S/p fracture 2015     Screening colonoscopy 06/2014    normal-repeat 10 yrs     Unspecified sinusitis (chronic)         Past Surgical History     Past Surgical History:   Procedure Laterality Date    BLEPHAROPLASTY Bilateral     COLONOSCOPY  07/21/2014    repeat 10 yrs.    HYSTERECTOMY (CERVIX STATUS UNKNOWN)  01/01/2000    for prolapse partial    LIPOMA RESECTION  01/01/2007    left thigh    PARTIAL HYSTERECTOMY (CERVIX NOT REMOVED)          Medications Prior to Admission     Prior to Admission medications    Medication Sig Start Date End Date Taking? Authorizing Provider   atorvastatin (LIPITOR) 40 MG tablet TAKE 1 TABLET DAILY (NEED FOLLOW UP APPOINTMENT FOR FUTURE MEDICATION REFILLS) 6/5/25  Yes Danni Haywood MD   sertraline (ZOLOFT) 100 MG tablet TAKE 1 TABLET DAILY

## 2025-06-06 NOTE — ANESTHESIA PRE PROCEDURE
Value Date/Time    WBC 6.6 08/08/2024 11:35 AM    RBC 4.64 08/08/2024 11:35 AM    HGB 14.4 08/08/2024 11:35 AM    HCT 42.5 08/08/2024 11:35 AM    MCV 91.6 08/08/2024 11:35 AM    RDW 13.0 08/08/2024 11:35 AM     08/08/2024 11:35 AM       CMP:   Lab Results   Component Value Date/Time     08/08/2024 11:35 AM    K 4.1 08/08/2024 11:35 AM     08/08/2024 11:35 AM    CO2 22 08/08/2024 11:35 AM    BUN 15 08/08/2024 11:35 AM    CREATININE 0.7 08/08/2024 11:35 AM    GFRAA >60 08/24/2022 01:31 PM    GFRAA >60 06/15/2012 09:28 AM    AGRATIO 1.8 08/08/2024 11:35 AM    LABGLOM >90 08/08/2024 11:35 AM    LABGLOM >60 02/01/2023 12:02 PM    GLUCOSE 97 08/08/2024 11:35 AM    CALCIUM 9.8 08/08/2024 11:35 AM    BILITOT 0.6 08/08/2024 11:35 AM    ALKPHOS 108 08/08/2024 11:35 AM    AST 38 08/08/2024 11:35 AM    ALT 59 08/08/2024 11:35 AM       POC Tests: No results for input(s): \"POCGLU\", \"POCNA\", \"POCK\", \"POCCL\", \"POCBUN\", \"POCHEMO\", \"POCHCT\" in the last 72 hours.    Coags:   Lab Results   Component Value Date/Time    APTT 24.7 09/24/2024 04:24 PM       HCG (If Applicable): No results found for: \"PREGTESTUR\", \"PREGSERUM\", \"HCG\", \"HCGQUANT\"     ABGs: No results found for: \"PHART\", \"PO2ART\", \"YEQ4VCV\", \"BCL6EWQ\", \"BEART\", \"A8SUIRRQ\"     Type & Screen (If Applicable):  No results found for: \"ABORH\", \"LABANTI\"    Drug/Infectious Status (If Applicable):  Lab Results   Component Value Date/Time    HIV Non-Reactive 09/21/2018 11:00 AM       COVID-19 Screening (If Applicable): No results found for: \"COVID19\"        Anesthesia Evaluation    Airway: Mallampati: II  TM distance: >3 FB   Neck ROM: full  Mouth opening: > = 3 FB   Dental:          Pulmonary: breath sounds clear to auscultation                             Cardiovascular:            Rhythm: regular  Rate: normal                    Neuro/Psych:   (+) headaches:, psychiatric history:            GI/Hepatic/Renal:             Endo/Other:    (+) DiabetesType II DM, :

## 2025-06-06 NOTE — DISCHARGE INSTRUCTIONS
Keep right leg dressing clean and dry.  No showers until removed.  Remove left leg dressing on Sunday night.  OK to shower after that point.  No baths.  OK to walk around as much as you want.  Try to avoid stairs to prevent slippage and bunching of wrap.  Extra strength tylenol for pain as needed.    Elevate leg when sitting.               [facility] AMBULATORY PROCEDURE DISCHARGE INSTRUCTIONS    There are potential side effects of anesthesia or sedation you may experience for the first 24 hours.  These side effects include:    Confusion or Memory loss, Dizziness, or Delayed Reaction Times   [x]A responsible person should be with you for the next 24 hours.  Do not operate any vehicles (automobiles, bicycles, motorcycles) or power tools or machinery for 24 hours.  Do not sign any legal documents or make any legal decisions for 24 hours. Do not drink alcohol for 24 hours or while taking narcotic pain medication.      Nausea    [x]Start with light diet and progress to your normal diet as you feel like eating. However, if you experience nausea or repeated episodes of vomiting which persist beyond 12-24 hours, notify your physician.  Once nausea has passed, remember to keep drinking fluids.    Difficulty Passing Urine  [x]Drink extra amounts of fluid today.  Notify your physician if you have not urinated within 8 hours after your procedure or you feel uncomfortable.      Irritated Throat from a Breathing Tube  [x]Drink extra amounts of fluid today.  Lozenges may help.    Muscle Aches  [x]You may experience some generalized body aches as your muscles recover from medications used to relax them during surgery.  These will gradually subside.    MEDICATION INSTRUCTIONS:  []Prescription(S) x     sent with you.  Use as directed.  When taking pain medications, you may experience the side effect of dizziness or drowsiness.  Do not drink alcohol or drive when taking these medications.  [x]Prescription(S) x 1         Called  Azithromycin Pregnancy And Lactation Text: This medication is considered safe during pregnancy and is also secreted in breast milk.

## 2025-06-06 NOTE — PROGRESS NOTES
Ambulatory Surgery/Procedure Discharge Note    Vitals:    06/06/25 1210   BP: 111/66   Pulse: 73   Resp: 18   Temp: 97.7 °F (36.5 °C)   SpO2: 96%   Phase 2 SDS  post LT. Leg vein ablation, s3ee flow sheet for assessment     In: 170 [P.O.:120; I.V.:50]  Out: -     Restroom use offered before discharge.  Yes    Pain assessment:  none  Pain Level: 3  1235  D/c iv  1240  Reviewed d/c instructions with pt and family member both verbalize their understanding   Up getting dressed  with help son off to get car    1240  Patient discharged to home/self care. Patient discharged via wheel chair by transporter to waiting family/S.O.       6/6/2025

## 2025-06-09 ENCOUNTER — TELEPHONE (OUTPATIENT)
Dept: VASCULAR SURGERY | Age: 65
End: 2025-06-09

## 2025-06-09 NOTE — TELEPHONE ENCOUNTER
Pt called with post op questions after surgery with Dr. Hall on 6/6. Pt is wondering if she can/should put anything on her incisions to help soothe them (ex. Aquaphor)? She is also wondering if she should wear her stockings overnight?    Per Dr. Hall, pt should not apply anything to her incisions. She does not have to wear her stockings overnight but can if she wants to.     Advised pt of this and she voiced understanding of the information.

## 2025-06-11 ENCOUNTER — TELEPHONE (OUTPATIENT)
Dept: VASCULAR SURGERY | Age: 65
End: 2025-06-11

## 2025-06-13 PROBLEM — H57.9 DISORDER OF EYE REGION: Status: ACTIVE | Noted: 2025-06-13

## 2025-06-17 ENCOUNTER — OFFICE VISIT (OUTPATIENT)
Dept: VASCULAR SURGERY | Age: 65
End: 2025-06-17

## 2025-06-17 VITALS
TEMPERATURE: 96.5 F | SYSTOLIC BLOOD PRESSURE: 126 MMHG | DIASTOLIC BLOOD PRESSURE: 88 MMHG | OXYGEN SATURATION: 94 % | HEART RATE: 94 BPM

## 2025-06-17 DIAGNOSIS — I80.02 THROMBOPHLEBITIS OF SUPERFICIAL VEINS OF LEFT LOWER EXTREMITY: Primary | ICD-10-CM

## 2025-06-17 PROCEDURE — 99024 POSTOP FOLLOW-UP VISIT: CPT | Performed by: SURGERY

## 2025-06-17 RX ORDER — DOXYCYCLINE HYCLATE 100 MG
100 TABLET ORAL 2 TIMES DAILY
Qty: 14 TABLET | Refills: 0 | Status: SHIPPED | OUTPATIENT
Start: 2025-06-17 | End: 2025-06-24

## 2025-06-17 NOTE — PROGRESS NOTES
2025    Debby Mata (:  1960) is a 65 y.o. female,Established patient, here for evaluation of the following chief complaint(s):  Post-Op Check (1st post-op s/p left RF GSV abl/phleb )        ASSESSMENT/PLAN:  1. Thrombophlebitis of superficial veins of left lower extremity  Debby is doing well status post her left leg venous ablation and phlebectomies.  She has some tenderness as expected.  All within normal range.  Warm/cool compress--alternate  Compression.  Doxy x 7 days.  Cortisone cream to affected area.  Follow up 4 weeks.        SUBJECTIVE/OBJECTIVE:  POV#1 L GSV ablation and phlebectomies 2025  Posterior thigh sore last few days.  Pain at ankle incision with small derm reaction.     Physical Exam  Vitals:    25 1000   BP: 126/88   BP Site: Left Upper Arm   Patient Position: Sitting   Pulse: 94   Temp: (!) 96.5 °F (35.8 °C)   TempSrc: Infrared   SpO2: 94%     Left lower extremity:  Phlebectomy incisions are healing nicely.  There is 1 area down by the left medial ankle where she is sore with some erythema reaction.  This looks to be a dermatological reaction.  Does not appear to be cellulitis.  There is no purulence.  Tender to touch.  No palpable cords.  Modest bruising as expected.        An electronic signature was used to authenticate this note.    --Kirstin Hall MD   
anterior cervical R/anterior cervical L

## 2025-07-15 ENCOUNTER — OFFICE VISIT (OUTPATIENT)
Dept: VASCULAR SURGERY | Age: 65
End: 2025-07-15

## 2025-07-15 VITALS
OXYGEN SATURATION: 95 % | HEIGHT: 64 IN | BODY MASS INDEX: 33.8 KG/M2 | SYSTOLIC BLOOD PRESSURE: 126 MMHG | DIASTOLIC BLOOD PRESSURE: 84 MMHG | HEART RATE: 76 BPM

## 2025-07-15 DIAGNOSIS — I80.02 THROMBOPHLEBITIS OF SUPERFICIAL VEINS OF LEFT LOWER EXTREMITY: Primary | ICD-10-CM

## 2025-07-15 DIAGNOSIS — I87.323 IDIOPATHIC CHRONIC VENOUS HYPERTENSION OF BOTH LOWER EXTREMITIES WITH INFLAMMATION: ICD-10-CM

## 2025-07-15 PROCEDURE — 99024 POSTOP FOLLOW-UP VISIT: CPT | Performed by: SURGERY

## 2025-07-15 NOTE — PROGRESS NOTES
7/15/2025    Debby Mata (:  1960) is a 65 y.o. female,Established patient, here for evaluation of the following chief complaint(s):  Post-Op Check (4wk f/u /LLE ablation/phlebectomies completed 25/No new concerns or questions)        ASSESSMENT/PLAN:  1. Thrombophlebitis of superficial veins of left lower extremity  2. Idiopathic chronic venous hypertension of both lower extremities with inflammation    Debby is doing well status post her left leg venous ablation and phlebectomies.    Incisions are looking much better and symptoms are well-controlled.  Continue routine compression, weight mgmt and exercise.   I will be happy to see her on an as-needed basis. Follow up as needed.        SUBJECTIVE/OBJECTIVE:  POV#2 L GSV ablation and phlebectomies 2025  Reports that leg is feeling much better.     Physical Exam  Vitals:    07/15/25 1455   BP: 126/84   BP Site: Left Upper Arm   Patient Position: Sitting   BP Cuff Size: Medium Adult   Pulse: 76   SpO2: 95%   Height: 1.626 m (5' 4.02\")       Left lower extremity:  Phlebectomy incisions are healing nicely.  No dermatitis, cellulitis or purulent drainage.  No erythema.   Trace edema well-controlled and symmetrical to right leg.         An electronic signature was used to authenticate this note.    --Kirstin Hall MD

## 2025-07-25 ENCOUNTER — TELEMEDICINE (OUTPATIENT)
Dept: INTERNAL MEDICINE CLINIC | Age: 65
End: 2025-07-25

## 2025-07-25 DIAGNOSIS — M05.79 RHEUMATOID ARTHRITIS INVOLVING MULTIPLE SITES WITH POSITIVE RHEUMATOID FACTOR (HCC): ICD-10-CM

## 2025-07-25 DIAGNOSIS — Z86.718 HISTORY OF DVT (DEEP VEIN THROMBOSIS): ICD-10-CM

## 2025-07-25 DIAGNOSIS — M25.552 BILATERAL HIP PAIN: ICD-10-CM

## 2025-07-25 DIAGNOSIS — M25.551 BILATERAL HIP PAIN: ICD-10-CM

## 2025-07-25 DIAGNOSIS — E11.9 TYPE 2 DIABETES MELLITUS WITHOUT COMPLICATION, WITHOUT LONG-TERM CURRENT USE OF INSULIN (HCC): Primary | ICD-10-CM

## 2025-07-25 PROBLEM — I82.409 DEEP VEIN THROMBOSIS (DVT) OF LOWER EXTREMITY (HCC): Status: RESOLVED | Noted: 2024-10-23 | Resolved: 2025-07-25

## 2025-07-25 NOTE — ASSESSMENT & PLAN NOTE
Controlled  She is on Mounjaro and doing well. Lost another 10 pounds since the last visit.   Increase Moujaro to 12.5 mg - labs at next visit     -No hypoglycemia episodes  -Last A1C:   Hemoglobin A1C   Date Value Ref Range Status   08/08/2024 5.4 See comment % Final     Comment:     Comment:  Diagnosis of Diabetes: > or = 6.5%  Increased risk of diabetes (Prediabetes): 5.7-6.4%  Glycemic Control: Nonpregnant Adults: <7.0%                    Pregnant: <6.0%          -Hyperlipidemia, LDL goal is <70 mg/dl, on Statin  -Microalbumin: check labs  -Hypertension: goal < 130/80  -Foot exam: routine exam  -Eye exam: pt reports up to date, annually will request a copy     The patient was advised to monitor blood sugar at home.   Continue low carb diet.  Diabetes Care: recommend yearly eye exam, foot exam and urine microalbumin to creatinine ratio.

## 2025-07-25 NOTE — ASSESSMENT & PLAN NOTE
Saw chiropractor in [place] and they concern that she has a tear but no imaging was taken. They also concern that she has hypermobility disorder.   I recommended patient to be evaluated by Ortho (Dr. Kemal Lopez at [place]) and if need can consider XR/MRI to evaluate her hips  Saw PT here once. She is benefit with PT   Can discuss w her rheum to see if she has concern for any hypermobility disorders

## 2025-07-25 NOTE — ASSESSMENT & PLAN NOTE
Completed treatment with Eliquis   Doing well  Heterozygous factor V leiden - low risk per heme

## 2025-07-25 NOTE — PROGRESS NOTES
Debby Mata, was evaluated through a synchronous (real-time) audio-video encounter. The patient (or guardian if applicable) is aware that this is a billable service, which includes applicable co-pays. This Virtual Visit was conducted with patient's (and/or legal guardian's) consent. Patient identification was verified, and a caregiver was present when appropriate.   The patient was located at Home: [address] [place] [address]  Provider was located at Facility (Appt Dept): [address] [place] [address], [address]  [place] [address]  Confirm you are appropriately licensed, registered, or certified to deliver care in the state where the patient is located as indicated above. If you are not or unsure, please re-schedule the visit: Yes, I confirm.     Debby Mata (:  1960) is a Established patient, presenting virtually for evaluation of the following:      Below is the assessment and plan developed based on review of pertinent history, physical exam, labs, studies, and medications.     Assessment & Plan  Type 2 diabetes mellitus without complication, without long-term current use of insulin (HCC)  Controlled  She is on Mounjaro and doing well. Lost another 10 pounds since the last visit.   Increase Moujaro to 12.5 mg - labs at next visit     -No hypoglycemia episodes  -Last A1C:   Hemoglobin A1C   Date Value Ref Range Status   2024 5.4 See comment % Final     Comment:     Comment:  Diagnosis of Diabetes: > or = 6.5%  Increased risk of diabetes (Prediabetes): 5.7-6.4%  Glycemic Control: Nonpregnant Adults: <7.0%                    Pregnant: <6.0%          -Hyperlipidemia, LDL goal is <70 mg/dl, on Statin  -Microalbumin: check labs  -Hypertension: goal < 130/80  -Foot exam: routine exam  -Eye exam: pt reports up to date, annually will request a copy     The patient was advised to monitor blood sugar at home.   Continue low carb diet.  Diabetes Care: recommend yearly eye exam, foot exam and

## 2025-07-30 ENCOUNTER — PATIENT MESSAGE (OUTPATIENT)
Dept: INTERNAL MEDICINE CLINIC | Age: 65
End: 2025-07-30

## 2025-08-11 ENCOUNTER — PATIENT MESSAGE (OUTPATIENT)
Dept: INTERNAL MEDICINE CLINIC | Age: 65
End: 2025-08-11

## 2025-08-11 DIAGNOSIS — N81.89 PELVIC FLOOR WEAKNESS: Primary | ICD-10-CM

## 2025-09-05 ENCOUNTER — HOSPITAL ENCOUNTER (OUTPATIENT)
Dept: PHYSICAL THERAPY | Age: 65
Setting detail: THERAPIES SERIES
Discharge: HOME | End: 2025-09-05
Payer: COMMERCIAL

## 2025-09-05 DIAGNOSIS — R53.1 WEAKNESS: ICD-10-CM

## 2025-09-05 DIAGNOSIS — M54.50 CHRONIC BILATERAL LOW BACK PAIN WITHOUT SCIATICA: ICD-10-CM

## 2025-09-05 DIAGNOSIS — R10.2 PELVIC PAIN: ICD-10-CM

## 2025-09-05 DIAGNOSIS — G89.29 CHRONIC BILATERAL LOW BACK PAIN WITHOUT SCIATICA: ICD-10-CM

## 2025-09-05 DIAGNOSIS — N81.89 PELVIC FLOOR WEAKNESS: Primary | ICD-10-CM

## 2025-09-05 PROCEDURE — 97530 THERAPEUTIC ACTIVITIES: CPT

## 2025-09-05 PROCEDURE — 97140 MANUAL THERAPY 1/> REGIONS: CPT

## 2025-09-05 PROCEDURE — 97161 PT EVAL LOW COMPLEX 20 MIN: CPT

## (undated) DEVICE — SOLUTION SURG PREP 26 CC PURPREP

## (undated) DEVICE — CATHETER ABLAT 7FR L60CM HEAT ELEMENT L7CM 0.025IN

## (undated) DEVICE — SOLUTION IV 1000ML 0.9% SOD CHL

## (undated) DEVICE — INTENDED USE FOR SURGICAL MARKING ON INTACT SKIN, ALSO PROVIDES A PERMANENT METHOD OF IDENTIFYING OBJECTS IN THE OPERATING ROOM: Brand: WRITESITE® PLUS MINI PREP RESISTANT MARKER

## (undated) DEVICE — STRIP,CLOSURE,WOUND,MEDI-STRIP,1/2X4: Brand: MEDLINE

## (undated) DEVICE — CONTAINER,SPECIMEN,PNEU TUBE,3OZ,OR STRL: Brand: MEDLINE

## (undated) DEVICE — LARGE BORE STOPCOCK WITH ROTATING MALE LUER LOCK

## (undated) DEVICE — STAPLER SKIN H3.9MM WIRE DIA0.58MM CRWN 6.9MM 35 STPL ROT

## (undated) DEVICE — BANDAGE ELASTIC 6 IN ELASTIC STRL ACE

## (undated) DEVICE — SYRINGE MED 50ML LUERLOCK TIP

## (undated) DEVICE — TOWEL,OR,DSP,ST,BLUE,DLX,8/PK,10PK/CS: Brand: MEDLINE

## (undated) DEVICE — BLANKET WRM W29.9XL79.1IN UP BODY FORC AIR MISTRAL-AIR

## (undated) DEVICE — HM34SPU @ HOVERMATT, SNGL USE, 34X78: Brand: MEDLINE

## (undated) DEVICE — LIQUIBAND RAPID ADHESIVE 36/CS 0.8ML: Brand: MEDLINE

## (undated) DEVICE — BANDAGE COBAN 4 IN COMPR W4INXL5YD FOAM COHESIVE QUIK STK SELF ADH SFT

## (undated) DEVICE — BLADE, TONGUE, 6", STERILE: Brand: MEDLINE

## (undated) DEVICE — SET INTRO SHTH MIC L7CM DIA7FR 0.018IN NDL L2.75IN DIA21GA

## (undated) DEVICE — AIR SHEET,LAT,COMFORT GLIDE, BLEND 40X80: Brand: MEDLINE

## (undated) DEVICE — PACK,UNIVERSAL,NO GOWNS: Brand: MEDLINE

## (undated) DEVICE — NEEDLE SPNL 20GA L3.5IN YEL HUB S STL REG WALL FIT STYL

## (undated) DEVICE — PADDING CAST W4INXL4YD HIGHLY ABSRB THAN COT EZ APPL

## (undated) DEVICE — BNDG,ELSTC,MATRIX,STRL,4"X5YD,LF,HOOK&LP: Brand: MEDLINE

## (undated) DEVICE — GLOVE SURG SZ 6 L11.2IN FNGR THK9.8MIL STRW LTX POLYMER

## (undated) DEVICE — GOWN,SIRUS,POLYRNF,BRTHSLV,LG,30/CS: Brand: MEDLINE

## (undated) DEVICE — PROVE COVER: Brand: UNBRANDED

## (undated) DEVICE — BLADE,CARBON-STEEL,11,STRL,DISPOSABLE,TB: Brand: MEDLINE

## (undated) DEVICE — SPONGE,LAP,18"X18",DLX,XR,ST,5/PK,40/PK: Brand: MEDLINE

## (undated) DEVICE — BANDAGE COMPR ELASTIC 4 IN STRL ACE

## (undated) DEVICE — GLOVE SURG SZ 65 L12IN FNGR THK79MIL GRN LTX FREE

## (undated) DEVICE — GEL US 20GM NONIRRITATING OVERWRAPPED FILE PCH TRNSMIT

## (undated) DEVICE — FIXED CORE WIRE GUIDE STRAIGHT: Brand: COOK

## (undated) DEVICE — SET ADMIN 25ML L117IN PMP MOD CK VLV RLER CLMP 2 SMRTSITE

## (undated) DEVICE — DRESSING PETRO W05INXL4YD N ADH STRP CURITY

## (undated) DEVICE — FOAM BUMP, LARGE: Brand: MEDLINE INDUSTRIES, INC.

## (undated) DEVICE — Device

## (undated) DEVICE — SYRINGE MED 10ML LUERLOCK TIP W/O SFTY DISP

## (undated) DEVICE — TOWEL,OR,DSP,ST,WHITE,DLX,4/PK,20PK/CS: Brand: MEDLINE

## (undated) DEVICE — 6 ML SYRINGE LUER-LOCK TIP: Brand: MONOJECT

## (undated) DEVICE — BLADE CLP TAPR HD WET DRY CAPABILITY GTT IN CHARGING USE

## (undated) DEVICE — SYRINGE MED 20ML STD CLR PLAS LUERLOCK TIP N CTRL DISP

## (undated) DEVICE — SOLUTION IRRIG 1000ML 09% SOD CHL USP PIC PLAS CONTAINER

## (undated) DEVICE — GENERAL: Brand: MEDLINE INDUSTRIES, INC.

## (undated) DEVICE — GARMENT,MEDLINE,DVT,INT,CALF,MED, GEN2: Brand: MEDLINE

## (undated) DEVICE — BNDG,ELSTC,MATRIX,STRL,6"X5YD,LF,HOOK&LP: Brand: MEDLINE

## (undated) DEVICE — SET EXTN PRIMING 4.9ML L30IN INCL SLDE CLMP SPIN M LUERLOCK